# Patient Record
Sex: FEMALE | Race: BLACK OR AFRICAN AMERICAN | Employment: FULL TIME | URBAN - METROPOLITAN AREA
[De-identification: names, ages, dates, MRNs, and addresses within clinical notes are randomized per-mention and may not be internally consistent; named-entity substitution may affect disease eponyms.]

---

## 2017-01-03 ENCOUNTER — TELEPHONE (OUTPATIENT)
Dept: FAMILY MEDICINE CLINIC | Facility: CLINIC | Age: 27
End: 2017-01-03

## 2017-01-03 NOTE — TELEPHONE ENCOUNTER
Pt is going to start a new job and will need a copy of her immunization record. Pt will be in the office tomorrow the copy up.

## 2017-01-04 NOTE — TELEPHONE ENCOUNTER
Patient contacted. She will  immunization form at [facility] .

## 2017-01-16 ENCOUNTER — TELEPHONE (OUTPATIENT)
Dept: OBGYN CLINIC | Facility: CLINIC | Age: 27
End: 2017-01-16

## 2017-01-16 NOTE — TELEPHONE ENCOUNTER
Pt calling to report the past two months she gets a thick white vaginal d/c and extreme burning 2 days before her period is due. Pt states she then gets her period for 7 days and the d/c and burning is gone.  Pt states it does not return until 2 days before

## 2017-01-24 NOTE — TELEPHONE ENCOUNTER
See the note below. Pt states that she ended her period and calling for an appt with JEANA.  Pt states yesterday she had the thick white clumpy vaginal discharge again and vaginal irritation.   Pt would like to be seen this week with JEANA and no appt is avail

## 2017-01-24 NOTE — TELEPHONE ENCOUNTER
JEANA stated that he would see the pt on 2/3/17 at noon at [facility]. Pt given the date, time and location for the appt.

## 2017-02-03 ENCOUNTER — OFFICE VISIT (OUTPATIENT)
Dept: OBGYN CLINIC | Facility: CLINIC | Age: 27
End: 2017-02-03

## 2017-02-03 VITALS
WEIGHT: 149 LBS | SYSTOLIC BLOOD PRESSURE: 112 MMHG | BODY MASS INDEX: 27 KG/M2 | HEART RATE: 69 BPM | DIASTOLIC BLOOD PRESSURE: 75 MMHG

## 2017-02-03 DIAGNOSIS — N89.8 VAGINAL DISCHARGE: Primary | ICD-10-CM

## 2017-02-03 DIAGNOSIS — B37.3 CANDIDIASIS OF VULVA AND VAGINA: ICD-10-CM

## 2017-02-03 PROCEDURE — 99213 OFFICE O/P EST LOW 20 MIN: CPT | Performed by: OBSTETRICS & GYNECOLOGY

## 2017-02-03 RX ORDER — FLUCONAZOLE 150 MG/1
150 TABLET ORAL ONCE
Qty: 2 TABLET | Refills: 0 | Status: SHIPPED | OUTPATIENT
Start: 2017-02-03 | End: 2017-02-03

## 2017-02-03 RX ORDER — NORGESTIMATE AND ETHINYL ESTRADIOL 7DAYSX3 28
1 KIT ORAL DAILY
Qty: 1 PACKAGE | Refills: 5 | Status: SHIPPED | OUTPATIENT
Start: 2017-02-03 | End: 2017-03-03

## 2017-02-05 LAB
CANDIDA SCREEN: NEGATIVE
GENITAL VAGINOSIS SCREEN: NEGATIVE
TRICHOMONAS SCREEN: NEGATIVE

## 2017-02-06 NOTE — PROGRESS NOTES
HPI:    Patient ID: Shayan Gasca is a 32year old female. HPI  with monthly menses lasting 7d and normal flow. She did have on time menses beginning  but lasted until the . This is a single occurrence.  She relates a thick white D lungs every 6 (six) hours as needed for Wheezing. Disp: 1 Inhaler Rfl: 0     Allergies:  Aloe Vera               Hives   PHYSICAL EXAM:   Physical Exam   Genitourinary:   EG, vagina and cervix w/o lesions. Thick white DC c/w candida. Culture done.  Uterus

## 2017-02-14 ENCOUNTER — TELEPHONE (OUTPATIENT)
Dept: OBGYN CLINIC | Facility: CLINIC | Age: 27
End: 2017-02-14

## 2017-02-14 NOTE — TELEPHONE ENCOUNTER
Pt states she recently changed from Dorsie Greener be to trinessa and is asking if it's normal to have irregular bleeding. Pt states she should have gotten her period yesterday but has not. Pt states she started the trinessa last Sunday just like JEANA instructed.  Inf

## 2017-03-29 ENCOUNTER — TELEPHONE (OUTPATIENT)
Dept: FAMILY MEDICINE CLINIC | Facility: CLINIC | Age: 27
End: 2017-03-29

## 2017-03-29 RX ORDER — IBUPROFEN 400 MG/1
TABLET ORAL
Qty: 40 TABLET | Refills: 0 | Status: SHIPPED | OUTPATIENT
Start: 2017-03-29 | End: 2017-04-07

## 2017-03-29 NOTE — TELEPHONE ENCOUNTER
Actions Requested: MD advice/possible Rx change or renew Ibuprofen? Pt has been taking Ibuprofen 800 mg instead of 400 mg prn for the pain. To Dr. Donnie Jimenez for Dr. LARSON out of office.   Ibuprofen 400 mg Rx pended as previously written for MD approval.  Situation/

## 2017-03-29 NOTE — TELEPHONE ENCOUNTER
Message noted. May refill ibuprofen as requested. Erx sent to listed pharmacy.  To follow up for appointment if not better; Please notify patient

## 2017-03-29 NOTE — TELEPHONE ENCOUNTER
Patient is calling to request a refill for   IBUPROFEN 400 MG Oral Tab TAKE 1 TABLET(400 MG) BY MOUTH EVERY 6 HOURS AS NEEDED FOR PAIN Disp: 40 tablet Rfl: 0     Stated that she is still getting headaches.  Doctor prescribed them specifically for the headac

## 2017-04-08 NOTE — TELEPHONE ENCOUNTER
· PLEASE ADVISE ON REFILL. THANKS.    Recent Visits       Provider Department Primary Dx    5 months ago Cami Huggins MD [facility], [facility], [address] Rash and nonspecific skin eruption    6 months ago Cami Huggins MD [address]

## 2017-04-12 RX ORDER — IBUPROFEN 400 MG/1
TABLET ORAL
Qty: 40 TABLET | Refills: 0 | Status: SHIPPED | OUTPATIENT
Start: 2017-04-12 | End: 2017-06-26

## 2017-04-12 NOTE — TELEPHONE ENCOUNTER
Message noted: Chart reviewed and may refill medication as requested times one with # additional refills. Prescription sent to listed pharmacy. Please notify patient.

## 2017-04-19 RX ORDER — IBUPROFEN 400 MG/1
TABLET ORAL
Qty: 40 TABLET | Refills: 0 | OUTPATIENT
Start: 2017-04-19

## 2017-05-03 ENCOUNTER — OFFICE VISIT (OUTPATIENT)
Dept: FAMILY MEDICINE CLINIC | Facility: CLINIC | Age: 27
End: 2017-05-03

## 2017-05-03 VITALS
WEIGHT: 145 LBS | TEMPERATURE: 98 F | SYSTOLIC BLOOD PRESSURE: 103 MMHG | HEART RATE: 70 BPM | BODY MASS INDEX: 26 KG/M2 | DIASTOLIC BLOOD PRESSURE: 71 MMHG

## 2017-05-03 DIAGNOSIS — R51.9 NONINTRACTABLE EPISODIC HEADACHE, UNSPECIFIED HEADACHE TYPE: Primary | ICD-10-CM

## 2017-05-03 PROCEDURE — 99213 OFFICE O/P EST LOW 20 MIN: CPT | Performed by: FAMILY MEDICINE

## 2017-05-03 PROCEDURE — 99212 OFFICE O/P EST SF 10 MIN: CPT | Performed by: FAMILY MEDICINE

## 2017-05-03 RX ORDER — NORGESTIMATE-ETHINYL ESTRADIOL 7DAYSX3 28
TABLET ORAL
Refills: 5 | COMMUNITY
Start: 2017-04-12 | End: 2017-05-31

## 2017-05-03 RX ORDER — AMITRIPTYLINE HYDROCHLORIDE 10 MG/1
10 TABLET, FILM COATED ORAL NIGHTLY
Qty: 30 TABLET | Refills: 2 | Status: SHIPPED | OUTPATIENT
Start: 2017-05-03 | End: 2017-05-15
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
## 2017-05-15 ENCOUNTER — TELEPHONE (OUTPATIENT)
Dept: FAMILY MEDICINE CLINIC | Facility: CLINIC | Age: 27
End: 2017-05-15

## 2017-05-15 ENCOUNTER — OFFICE VISIT (OUTPATIENT)
Dept: FAMILY MEDICINE CLINIC | Facility: CLINIC | Age: 27
End: 2017-05-15

## 2017-05-15 VITALS
TEMPERATURE: 99 F | HEIGHT: 62 IN | SYSTOLIC BLOOD PRESSURE: 120 MMHG | BODY MASS INDEX: 25.76 KG/M2 | DIASTOLIC BLOOD PRESSURE: 86 MMHG | RESPIRATION RATE: 20 BRPM | WEIGHT: 140 LBS | HEART RATE: 96 BPM

## 2017-05-15 DIAGNOSIS — S39.012D LUMBAR STRAIN, SUBSEQUENT ENCOUNTER: ICD-10-CM

## 2017-05-15 DIAGNOSIS — S92.354D CLOSED NONDISPLACED FRACTURE OF FIFTH METATARSAL BONE OF RIGHT FOOT WITH ROUTINE HEALING, SUBSEQUENT ENCOUNTER: Primary | ICD-10-CM

## 2017-05-15 DIAGNOSIS — M54.6 PAIN IN THORACIC SPINE: ICD-10-CM

## 2017-05-15 DIAGNOSIS — S09.90XD HEAD INJURY, SUBSEQUENT ENCOUNTER: ICD-10-CM

## 2017-05-15 DIAGNOSIS — M54.2 CERVICALGIA: ICD-10-CM

## 2017-05-15 PROCEDURE — 99212 OFFICE O/P EST SF 10 MIN: CPT | Performed by: PHYSICIAN ASSISTANT

## 2017-05-15 PROCEDURE — 99214 OFFICE O/P EST MOD 30 MIN: CPT | Performed by: PHYSICIAN ASSISTANT

## 2017-05-15 RX ORDER — CYCLOBENZAPRINE HCL 10 MG
10 TABLET ORAL 3 TIMES DAILY PRN
Qty: 30 TABLET | Refills: 0 | Status: SHIPPED | OUTPATIENT
Start: 2017-05-15 | End: 2017-05-31

## 2017-05-15 RX ORDER — IBUPROFEN 800 MG/1
800 TABLET ORAL EVERY 6 HOURS PRN
Qty: 60 TABLET | Refills: 0 | Status: SHIPPED | OUTPATIENT
Start: 2017-05-15 | End: 2017-05-27

## 2017-05-15 RX ORDER — HYDROCODONE BITARTRATE AND ACETAMINOPHEN 7.5; 325 MG/1; MG/1
1 TABLET ORAL EVERY 4 HOURS PRN
Qty: 40 TABLET | Refills: 0 | Status: SHIPPED | OUTPATIENT
Start: 2017-05-15 | End: 2017-05-31
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
## 2017-05-16 ENCOUNTER — TELEPHONE (OUTPATIENT)
Dept: FAMILY MEDICINE CLINIC | Facility: CLINIC | Age: 27
End: 2017-05-16

## 2017-05-16 ENCOUNTER — TELEPHONE (OUTPATIENT)
Dept: PODIATRY CLINIC | Facility: CLINIC | Age: 27
End: 2017-05-16

## 2017-05-16 DIAGNOSIS — S09.90XA HEAD INJURY, INITIAL ENCOUNTER: Primary | ICD-10-CM

## 2017-05-16 NOTE — TELEPHONE ENCOUNTER
Central Scheduling is now calling. Should pt wait until Thursday for the CT Scan or should they do it stat?

## 2017-05-16 NOTE — TELEPHONE ENCOUNTER
KRISTA Jimenez I have called pt insurance to get a PA since Kimberly Ray changed the order to stat.  I was inform to fax them the OV from yesterday and that someone will call the pt today and give her the authorization number so she can get the CT scan done
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
## 2017-05-16 NOTE — TELEPHONE ENCOUNTER
Patient contacted; explained our office has reached out to [address] [place] [address] for stat CT scan of head. Alfie was to call patient. Patient states she has not heard from [address] [place] [address]. Patient denied any severe head pain at this time.   Patient will call [facility]

## 2017-05-17 NOTE — TELEPHONE ENCOUNTER
Patient called and stated [facility] Authorized the CT scan; Authorization number [id number]. She does not have a ride to have it done today.    Will proceed to have it done on Thursday at 1:30 pm

## 2017-05-18 ENCOUNTER — HOSPITAL ENCOUNTER (OUTPATIENT)
Dept: GENERAL RADIOLOGY | Facility: HOSPITAL | Age: 27
Discharge: HOME | End: 2017-05-18
Attending: PHYSICIAN ASSISTANT
Payer: MEDICAID

## 2017-05-18 ENCOUNTER — HOSPITAL ENCOUNTER (OUTPATIENT)
Dept: CT IMAGING | Facility: HOSPITAL | Age: 27
Discharge: HOME | End: 2017-05-18
Attending: PHYSICIAN ASSISTANT
Payer: MEDICAID

## 2017-05-18 DIAGNOSIS — S09.90XD HEAD INJURY, SUBSEQUENT ENCOUNTER: ICD-10-CM

## 2017-05-18 DIAGNOSIS — M54.2 CERVICALGIA: ICD-10-CM

## 2017-05-18 DIAGNOSIS — S39.012D LUMBAR STRAIN, SUBSEQUENT ENCOUNTER: ICD-10-CM

## 2017-05-18 DIAGNOSIS — M54.6 PAIN IN THORACIC SPINE: ICD-10-CM

## 2017-05-18 PROCEDURE — 72100 X-RAY EXAM L-S SPINE 2/3 VWS: CPT | Performed by: PHYSICIAN ASSISTANT

## 2017-05-18 PROCEDURE — 72070 X-RAY EXAM THORAC SPINE 2VWS: CPT | Performed by: PHYSICIAN ASSISTANT

## 2017-05-18 PROCEDURE — 72040 X-RAY EXAM NECK SPINE 2-3 VW: CPT | Performed by: PHYSICIAN ASSISTANT

## 2017-05-18 PROCEDURE — 70450 CT HEAD/BRAIN W/O DYE: CPT | Performed by: PHYSICIAN ASSISTANT

## 2017-05-19 ENCOUNTER — HOSPITAL ENCOUNTER (OUTPATIENT)
Dept: GENERAL RADIOLOGY | Facility: HOSPITAL | Age: 27
Discharge: HOME | End: 2017-05-19
Attending: ORTHOPAEDIC SURGERY
Payer: MEDICAID

## 2017-05-19 ENCOUNTER — OFFICE VISIT (OUTPATIENT)
Dept: ORTHOPEDICS CLINIC | Facility: CLINIC | Age: 27
End: 2017-05-19

## 2017-05-19 ENCOUNTER — TELEPHONE (OUTPATIENT)
Dept: ADMINISTRATIVE | Age: 27
End: 2017-05-19

## 2017-05-19 DIAGNOSIS — S82.831A CLOSED AVULSION FRACTURE OF DISTAL FIBULA, RIGHT, INITIAL ENCOUNTER: Primary | ICD-10-CM

## 2017-05-19 DIAGNOSIS — M25.571 RIGHT ANKLE PAIN, UNSPECIFIED CHRONICITY: ICD-10-CM

## 2017-05-19 DIAGNOSIS — S82.54XA CLOSED NONDISPLACED FRACTURE OF MEDIAL MALLEOLUS OF RIGHT TIBIA, INITIAL ENCOUNTER: ICD-10-CM

## 2017-05-19 DIAGNOSIS — S92.354A CLOSED NONDISPLACED FRACTURE OF FIFTH METATARSAL BONE OF RIGHT FOOT, INITIAL ENCOUNTER: ICD-10-CM

## 2017-05-19 DIAGNOSIS — M79.671 RIGHT FOOT PAIN: ICD-10-CM

## 2017-05-19 PROCEDURE — 72080 X-RAY EXAM THORACOLMB 2/> VW: CPT | Performed by: ORTHOPAEDIC SURGERY

## 2017-05-19 PROCEDURE — 27760 CLTX MEDIAL ANKLE FX: CPT | Performed by: ORTHOPAEDIC SURGERY

## 2017-05-19 PROCEDURE — 99244 OFF/OP CNSLTJ NEW/EST MOD 40: CPT | Performed by: ORTHOPAEDIC SURGERY

## 2017-05-19 PROCEDURE — 73610 X-RAY EXAM OF ANKLE: CPT | Performed by: ORTHOPAEDIC SURGERY

## 2017-05-19 PROCEDURE — 28470 CLTX METATARSAL FX WO MNP EA: CPT | Performed by: ORTHOPAEDIC SURGERY

## 2017-05-19 PROCEDURE — 99212 OFFICE O/P EST SF 10 MIN: CPT | Performed by: ORTHOPAEDIC SURGERY

## 2017-05-19 PROCEDURE — 73630 X-RAY EXAM OF FOOT: CPT | Performed by: ORTHOPAEDIC SURGERY

## 2017-05-19 RX ORDER — HYDROCODONE BITARTRATE AND ACETAMINOPHEN 5; 325 MG/1; MG/1
1-2 TABLET ORAL
COMMUNITY
Start: 2017-05-15 | End: 2017-05-20
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
## 2017-05-19 NOTE — TELEPHONE ENCOUNTER
Good afternoon Dr. Lima Bear form pending in [place] for patients , Tracy Hirsch. Do you approve intermittent time off for patient's  to transport her to physical therapy and assist at home with their small child? 1-4 times per month?   Please advi

## 2017-05-22 ENCOUNTER — TELEPHONE (OUTPATIENT)
Dept: FAMILY MEDICINE CLINIC | Facility: CLINIC | Age: 27
End: 2017-05-22

## 2017-05-22 NOTE — TELEPHONE ENCOUNTER
Please advise, Pt was seenby You 5/15/17 and  5/19 by Ortho was foot placed in [place] walker,stated right foot more swollen/pain-advised to make sure foot is elevated with heel off pillow,encourage to really Push Fluid IT,Fiber-raw fruits and Prunes since she

## 2017-05-22 NOTE — TELEPHONE ENCOUNTER
Spoke with patient who reports increased swelling of right foot. She denies increased swelling of leg. She is currently in CAM boot. Advised patient to elevate leg and push fluids. She has follow up appointment with ortho 5/26.

## 2017-05-24 ENCOUNTER — TELEPHONE (OUTPATIENT)
Dept: FAMILY MEDICINE CLINIC | Facility: CLINIC | Age: 27
End: 2017-05-24

## 2017-05-26 ENCOUNTER — OFFICE VISIT (OUTPATIENT)
Dept: ORTHOPEDICS CLINIC | Facility: CLINIC | Age: 27
End: 2017-05-26

## 2017-05-26 ENCOUNTER — HOSPITAL ENCOUNTER (OUTPATIENT)
Dept: GENERAL RADIOLOGY | Facility: HOSPITAL | Age: 27
Discharge: HOME | End: 2017-05-26
Attending: ORTHOPAEDIC SURGERY
Payer: MEDICAID

## 2017-05-26 DIAGNOSIS — M79.671 RIGHT FOOT PAIN: ICD-10-CM

## 2017-05-26 DIAGNOSIS — S82.54XA CLOSED NONDISPLACED FRACTURE OF MEDIAL MALLEOLUS OF RIGHT TIBIA, INITIAL ENCOUNTER: ICD-10-CM

## 2017-05-26 DIAGNOSIS — Z47.89 ORTHOPEDIC AFTERCARE: ICD-10-CM

## 2017-05-26 DIAGNOSIS — S92.354A CLOSED NONDISPLACED FRACTURE OF FIFTH METATARSAL BONE OF RIGHT FOOT, INITIAL ENCOUNTER: ICD-10-CM

## 2017-05-26 DIAGNOSIS — S82.831A CLOSED AVULSION FRACTURE OF DISTAL FIBULA, RIGHT, INITIAL ENCOUNTER: Primary | ICD-10-CM

## 2017-05-26 PROCEDURE — 73610 X-RAY EXAM OF ANKLE: CPT | Performed by: ORTHOPAEDIC SURGERY

## 2017-05-26 PROCEDURE — 99212 OFFICE O/P EST SF 10 MIN: CPT | Performed by: ORTHOPAEDIC SURGERY

## 2017-05-26 PROCEDURE — 73630 X-RAY EXAM OF FOOT: CPT | Performed by: ORTHOPAEDIC SURGERY

## 2017-05-26 PROCEDURE — 99024 POSTOP FOLLOW-UP VISIT: CPT | Performed by: ORTHOPAEDIC SURGERY

## 2017-05-26 RX ORDER — GABAPENTIN 300 MG/1
300 CAPSULE ORAL 3 TIMES DAILY
Qty: 90 CAPSULE | Refills: 0 | Status: SHIPPED | OUTPATIENT
Start: 2017-05-26 | End: 2017-06-22
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
## 2017-05-30 ENCOUNTER — TELEPHONE (OUTPATIENT)
Dept: FAMILY MEDICINE CLINIC | Facility: CLINIC | Age: 27
End: 2017-05-30

## 2017-05-30 RX ORDER — IBUPROFEN 800 MG/1
TABLET ORAL
Qty: 60 TABLET | Refills: 0 | Status: SHIPPED | OUTPATIENT
Start: 2017-05-30 | End: 2017-06-12

## 2017-05-30 NOTE — TELEPHONE ENCOUNTER
Pt calling regarding message she had earlier pt wants get medication refilled for  hydrocodone-acetaminophen 7.5-325 MG Oral Tab, Cyclobenzaprine HCl 10 MG Oral Tab and TRINESSA, 28, 0.18/0.215/0.25 MG-35 MCG Oral Tab.         Current Outpatient Prescriptio

## 2017-05-30 NOTE — TELEPHONE ENCOUNTER
Pt asking to speak with Jasmin Conklin or an nurse regarding medication. No other info was given. Please advise.

## 2017-05-31 RX ORDER — CYCLOBENZAPRINE HCL 10 MG
10 TABLET ORAL 3 TIMES DAILY PRN
Qty: 30 TABLET | Refills: 0 | Status: SHIPPED | OUTPATIENT
Start: 2017-05-31 | End: 2018-01-13 | Stop reason: ALTCHOICE

## 2017-05-31 RX ORDER — HYDROCODONE BITARTRATE AND ACETAMINOPHEN 7.5; 325 MG/1; MG/1
1 TABLET ORAL EVERY 4 HOURS PRN
Qty: 40 TABLET | Refills: 0 | Status: SHIPPED | OUTPATIENT
Start: 2017-05-31 | End: 2017-06-19

## 2017-05-31 RX ORDER — NORGESTIMATE-ETHINYL ESTRADIOL 7DAYSX3 28
TABLET ORAL
Qty: 28 TABLET | Refills: 5 | Status: SHIPPED | OUTPATIENT
Start: 2017-05-31 | End: 2017-06-26

## 2017-05-31 NOTE — TELEPHONE ENCOUNTER
Medication refills approved. Please notify patient Stephen Winston ready for  at [facility] office.

## 2017-06-09 ENCOUNTER — HOSPITAL ENCOUNTER (OUTPATIENT)
Dept: GENERAL RADIOLOGY | Facility: HOSPITAL | Age: 27
Discharge: HOME | End: 2017-06-09
Attending: ORTHOPAEDIC SURGERY
Payer: MEDICAID

## 2017-06-09 ENCOUNTER — OFFICE VISIT (OUTPATIENT)
Dept: ORTHOPEDICS CLINIC | Facility: CLINIC | Age: 27
End: 2017-06-09

## 2017-06-09 DIAGNOSIS — Z47.89 ORTHOPEDIC AFTERCARE: ICD-10-CM

## 2017-06-09 DIAGNOSIS — S92.354A CLOSED NONDISPLACED FRACTURE OF FIFTH METATARSAL BONE OF RIGHT FOOT, INITIAL ENCOUNTER: ICD-10-CM

## 2017-06-09 DIAGNOSIS — S82.831A CLOSED AVULSION FRACTURE OF DISTAL FIBULA, RIGHT, INITIAL ENCOUNTER: Primary | ICD-10-CM

## 2017-06-09 DIAGNOSIS — S82.54XA CLOSED NONDISPLACED FRACTURE OF MEDIAL MALLEOLUS OF RIGHT TIBIA, INITIAL ENCOUNTER: ICD-10-CM

## 2017-06-09 PROCEDURE — 73630 X-RAY EXAM OF FOOT: CPT | Performed by: ORTHOPAEDIC SURGERY

## 2017-06-09 PROCEDURE — 99212 OFFICE O/P EST SF 10 MIN: CPT | Performed by: ORTHOPAEDIC SURGERY

## 2017-06-09 PROCEDURE — 73610 X-RAY EXAM OF ANKLE: CPT | Performed by: ORTHOPAEDIC SURGERY

## 2017-06-09 PROCEDURE — 99024 POSTOP FOLLOW-UP VISIT: CPT | Performed by: ORTHOPAEDIC SURGERY

## 2017-06-09 NOTE — TELEPHONE ENCOUNTER
Spoke with patient at son's office visit. She states all paperwork is taken care of at this time. Will await any other paperwork if needed.
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
## 2017-06-12 RX ORDER — IBUPROFEN 800 MG/1
TABLET ORAL
Qty: 60 TABLET | Refills: 0 | Status: SHIPPED | OUTPATIENT
Start: 2017-06-12 | End: 2017-06-26

## 2017-06-15 ENCOUNTER — TELEPHONE (OUTPATIENT)
Dept: ORTHOPEDICS CLINIC | Facility: CLINIC | Age: 27
End: 2017-06-15

## 2017-06-15 NOTE — TELEPHONE ENCOUNTER
S/w pt and she states the last 3 days her swelling has gone down, but her foot/ankle pain has increased slightly for 4/10 to 6/10 and tender to touch. She states there is more bruising over the foot. She denies any warmth or redness.  She is taking gabapent

## 2017-06-19 RX ORDER — HYDROCODONE BITARTRATE AND ACETAMINOPHEN 5; 325 MG/1; MG/1
1 TABLET ORAL EVERY 4 HOURS PRN
Qty: 30 TABLET | Refills: 0 | Status: SHIPPED | OUTPATIENT
Start: 2017-06-19 | End: 2018-01-13

## 2017-06-22 RX ORDER — GABAPENTIN 300 MG/1
300 CAPSULE ORAL 3 TIMES DAILY
Qty: 90 CAPSULE | Refills: 0 | Status: SHIPPED | OUTPATIENT
Start: 2017-06-22 | End: 2018-01-13 | Stop reason: ALTCHOICE

## 2017-06-22 NOTE — TELEPHONE ENCOUNTER
Received fax from [address] in [place] on [address] [address] requesting refill of Gabapentin 300 mg capsules from [facility].

## 2017-06-26 ENCOUNTER — OFFICE VISIT (OUTPATIENT)
Dept: OBGYN CLINIC | Facility: CLINIC | Age: 27
End: 2017-06-26

## 2017-06-26 VITALS
DIASTOLIC BLOOD PRESSURE: 85 MMHG | WEIGHT: 135 LBS | HEIGHT: 62 IN | BODY MASS INDEX: 24.84 KG/M2 | HEART RATE: 66 BPM | SYSTOLIC BLOOD PRESSURE: 126 MMHG

## 2017-06-26 DIAGNOSIS — Z01.419 ENCOUNTER FOR GYNECOLOGICAL EXAMINATION WITHOUT ABNORMAL FINDING: Primary | ICD-10-CM

## 2017-06-26 PROCEDURE — 99395 PREV VISIT EST AGE 18-39: CPT | Performed by: OBSTETRICS & GYNECOLOGY

## 2017-06-26 RX ORDER — NORGESTIMATE-ETHINYL ESTRADIOL 7DAYSX3 28
TABLET ORAL
Qty: 1 PACKAGE | Refills: 12 | Status: SHIPPED | OUTPATIENT
Start: 2017-06-26 | End: 2018-01-13

## 2017-06-26 NOTE — PROGRESS NOTES
HPI:    Patient ID: Shayan Gasca is a 32year old female. HPI G2 [id number] with reg menses on OCP for BC. No complaints. No current plans for conception.      Review of Systems   Constitutional: Negative for appetite change, fatigue and unexpected weight breasts without skin / nipple changes, mass, tenderness or axillary adenopathy. Abdominal: Soft. She exhibits no distension and no mass. There is no tenderness. There is no rebound and no guarding.    Genitourinary: Vagina normal and uterus normal. No b

## 2017-06-27 RX ORDER — IBUPROFEN 800 MG/1
TABLET ORAL
Qty: 60 TABLET | Refills: 1 | Status: SHIPPED | OUTPATIENT
Start: 2017-06-27 | End: 2017-07-22

## 2017-06-30 ENCOUNTER — HOSPITAL ENCOUNTER (OUTPATIENT)
Dept: GENERAL RADIOLOGY | Facility: HOSPITAL | Age: 27
Discharge: HOME | End: 2017-06-30
Attending: ORTHOPAEDIC SURGERY
Payer: MEDICAID

## 2017-06-30 ENCOUNTER — OFFICE VISIT (OUTPATIENT)
Dept: ORTHOPEDICS CLINIC | Facility: CLINIC | Age: 27
End: 2017-06-30

## 2017-06-30 DIAGNOSIS — Z47.89 ORTHOPEDIC AFTERCARE: ICD-10-CM

## 2017-06-30 DIAGNOSIS — S82.54XA CLOSED NONDISPLACED FRACTURE OF MEDIAL MALLEOLUS OF RIGHT TIBIA, INITIAL ENCOUNTER: ICD-10-CM

## 2017-06-30 DIAGNOSIS — S82.831A CLOSED AVULSION FRACTURE OF DISTAL FIBULA, RIGHT, INITIAL ENCOUNTER: Primary | ICD-10-CM

## 2017-06-30 DIAGNOSIS — S92.354A CLOSED NONDISPLACED FRACTURE OF FIFTH METATARSAL BONE OF RIGHT FOOT, INITIAL ENCOUNTER: ICD-10-CM

## 2017-06-30 PROCEDURE — 73630 X-RAY EXAM OF FOOT: CPT | Performed by: ORTHOPAEDIC SURGERY

## 2017-06-30 PROCEDURE — 73610 X-RAY EXAM OF ANKLE: CPT | Performed by: ORTHOPAEDIC SURGERY

## 2017-06-30 PROCEDURE — 99212 OFFICE O/P EST SF 10 MIN: CPT | Performed by: ORTHOPAEDIC SURGERY

## 2017-06-30 PROCEDURE — 99024 POSTOP FOLLOW-UP VISIT: CPT | Performed by: ORTHOPAEDIC SURGERY
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
## 2017-07-07 ENCOUNTER — OFFICE VISIT (OUTPATIENT)
Dept: PHYSICAL THERAPY | Facility: HOSPITAL | Age: 27
End: 2017-07-07
Attending: ORTHOPAEDIC SURGERY
Payer: COMMERCIAL

## 2017-07-07 DIAGNOSIS — S92.354A CLOSED NONDISPLACED FRACTURE OF FIFTH METATARSAL BONE OF RIGHT FOOT, INITIAL ENCOUNTER: ICD-10-CM

## 2017-07-07 DIAGNOSIS — S82.54XA CLOSED NONDISPLACED FRACTURE OF MEDIAL MALLEOLUS OF RIGHT TIBIA, INITIAL ENCOUNTER: ICD-10-CM

## 2017-07-07 DIAGNOSIS — S82.831A CLOSED AVULSION FRACTURE OF DISTAL FIBULA, RIGHT, INITIAL ENCOUNTER: ICD-10-CM

## 2017-07-07 PROCEDURE — 97110 THERAPEUTIC EXERCISES: CPT

## 2017-07-07 PROCEDURE — 97162 PT EVAL MOD COMPLEX 30 MIN: CPT
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
## 2017-07-12 ENCOUNTER — TELEPHONE (OUTPATIENT)
Dept: ORTHOPEDICS CLINIC | Facility: CLINIC | Age: 27
End: 2017-07-12

## 2017-07-12 NOTE — TELEPHONE ENCOUNTER
S/w pt and she states she needs 4 more wks of PT and is requesting a note for work to be off another 6 wks. Please advise? Also she is requesting to use her full name in the letter Erik Shepherd.

## 2017-07-12 NOTE — TELEPHONE ENCOUNTER
Pt needs note for work for 2 week extension due to needing 2 more weeks of PT - 6 weeks form now - asking for note to be mailed to her and a copy ot be faxed to work at [phone] sara Looney

## 2017-07-13 NOTE — TELEPHONE ENCOUNTER
Call to Su. Made aware that letter will be timed for next appointment August 11th, Pt wanted copy of letter also to be mailed. Letter faxed to her work as sima and mailed to her.

## 2017-07-17 ENCOUNTER — APPOINTMENT (OUTPATIENT)
Dept: PHYSICAL THERAPY | Facility: HOSPITAL | Age: 27
End: 2017-07-17
Attending: ORTHOPAEDIC SURGERY
Payer: COMMERCIAL

## 2017-07-21 ENCOUNTER — OFFICE VISIT (OUTPATIENT)
Dept: PHYSICAL THERAPY | Facility: HOSPITAL | Age: 27
End: 2017-07-21
Attending: ORTHOPAEDIC SURGERY
Payer: COMMERCIAL

## 2017-07-21 DIAGNOSIS — S82.54XA CLOSED NONDISPLACED FRACTURE OF MEDIAL MALLEOLUS OF RIGHT TIBIA, INITIAL ENCOUNTER: ICD-10-CM

## 2017-07-21 DIAGNOSIS — S82.831A CLOSED AVULSION FRACTURE OF DISTAL FIBULA, RIGHT, INITIAL ENCOUNTER: ICD-10-CM

## 2017-07-21 DIAGNOSIS — S92.354A CLOSED NONDISPLACED FRACTURE OF FIFTH METATARSAL BONE OF RIGHT FOOT, INITIAL ENCOUNTER: ICD-10-CM

## 2017-07-21 PROCEDURE — 97110 THERAPEUTIC EXERCISES: CPT

## 2017-07-21 NOTE — PROGRESS NOTES
Diagnosis: Closed avulsion fracture of distal fibula, right, initial encounter (I30.697F)  Closed nondisplaced fracture of medial malleolus of right tibia, initial encounter (S82.54XA)  Closed nondisplaced fracture of fifth metatarsal bone of right foot, i

## 2017-07-24 ENCOUNTER — APPOINTMENT (OUTPATIENT)
Dept: PHYSICAL THERAPY | Facility: HOSPITAL | Age: 27
End: 2017-07-24
Attending: ORTHOPAEDIC SURGERY
Payer: COMMERCIAL

## 2017-07-25 RX ORDER — IBUPROFEN 800 MG/1
TABLET ORAL
Qty: 60 TABLET | Refills: 0 | Status: SHIPPED | OUTPATIENT
Start: 2017-07-25 | End: 2017-08-09

## 2017-07-28 ENCOUNTER — OFFICE VISIT (OUTPATIENT)
Dept: PHYSICAL THERAPY | Facility: HOSPITAL | Age: 27
End: 2017-07-28
Attending: ORTHOPAEDIC SURGERY
Payer: COMMERCIAL

## 2017-07-28 DIAGNOSIS — S82.831A CLOSED AVULSION FRACTURE OF DISTAL FIBULA, RIGHT, INITIAL ENCOUNTER: ICD-10-CM

## 2017-07-28 DIAGNOSIS — S92.354A CLOSED NONDISPLACED FRACTURE OF FIFTH METATARSAL BONE OF RIGHT FOOT, INITIAL ENCOUNTER: ICD-10-CM

## 2017-07-28 DIAGNOSIS — S82.54XA CLOSED NONDISPLACED FRACTURE OF MEDIAL MALLEOLUS OF RIGHT TIBIA, INITIAL ENCOUNTER: ICD-10-CM

## 2017-07-28 PROCEDURE — 97110 THERAPEUTIC EXERCISES: CPT

## 2017-07-28 NOTE — PROGRESS NOTES
Diagnosis: Closed avulsion fracture of distal fibula, right, initial encounter (K70.640T)  Closed nondisplaced fracture of medial malleolus of right tibia, initial encounter (S82.54XA)  Closed nondisplaced fracture of fifth metatarsal bone of right foot, i

## 2017-07-31 ENCOUNTER — APPOINTMENT (OUTPATIENT)
Dept: PHYSICAL THERAPY | Facility: HOSPITAL | Age: 27
End: 2017-07-31
Attending: ORTHOPAEDIC SURGERY
Payer: COMMERCIAL

## 2017-08-04 ENCOUNTER — OFFICE VISIT (OUTPATIENT)
Dept: PHYSICAL THERAPY | Facility: HOSPITAL | Age: 27
End: 2017-08-04
Attending: ORTHOPAEDIC SURGERY
Payer: COMMERCIAL

## 2017-08-04 DIAGNOSIS — S92.354A CLOSED NONDISPLACED FRACTURE OF FIFTH METATARSAL BONE OF RIGHT FOOT, INITIAL ENCOUNTER: ICD-10-CM

## 2017-08-04 DIAGNOSIS — S82.54XA CLOSED NONDISPLACED FRACTURE OF MEDIAL MALLEOLUS OF RIGHT TIBIA, INITIAL ENCOUNTER: ICD-10-CM

## 2017-08-04 DIAGNOSIS — S82.831A CLOSED AVULSION FRACTURE OF DISTAL FIBULA, RIGHT, INITIAL ENCOUNTER: ICD-10-CM

## 2017-08-04 PROCEDURE — 97110 THERAPEUTIC EXERCISES: CPT

## 2017-08-04 NOTE — PROGRESS NOTES
Diagnosis: Closed avulsion fracture of distal fibula, right, initial encounter (B26.998W)  Closed nondisplaced fracture of medial malleolus of right tibia, initial encounter (S82.54XA)  Closed nondisplaced fracture of fifth metatarsal bone of right foot, i

## 2017-08-10 ENCOUNTER — APPOINTMENT (OUTPATIENT)
Dept: PHYSICAL THERAPY | Facility: HOSPITAL | Age: 27
End: 2017-08-10
Attending: ORTHOPAEDIC SURGERY
Payer: COMMERCIAL

## 2017-08-11 ENCOUNTER — HOSPITAL ENCOUNTER (OUTPATIENT)
Dept: GENERAL RADIOLOGY | Facility: HOSPITAL | Age: 27
Discharge: HOME | End: 2017-08-11
Attending: ORTHOPAEDIC SURGERY
Payer: COMMERCIAL

## 2017-08-11 ENCOUNTER — OFFICE VISIT (OUTPATIENT)
Dept: ORTHOPEDICS CLINIC | Facility: CLINIC | Age: 27
End: 2017-08-11

## 2017-08-11 DIAGNOSIS — Z47.89 ORTHOPEDIC AFTERCARE: ICD-10-CM

## 2017-08-11 DIAGNOSIS — S82.54XA CLOSED NONDISPLACED FRACTURE OF MEDIAL MALLEOLUS OF RIGHT TIBIA, INITIAL ENCOUNTER: ICD-10-CM

## 2017-08-11 DIAGNOSIS — S92.354A CLOSED NONDISPLACED FRACTURE OF FIFTH METATARSAL BONE OF RIGHT FOOT, INITIAL ENCOUNTER: ICD-10-CM

## 2017-08-11 DIAGNOSIS — S82.831A CLOSED AVULSION FRACTURE OF DISTAL FIBULA, RIGHT, INITIAL ENCOUNTER: Primary | ICD-10-CM

## 2017-08-11 PROCEDURE — 99024 POSTOP FOLLOW-UP VISIT: CPT | Performed by: ORTHOPAEDIC SURGERY

## 2017-08-11 PROCEDURE — 99212 OFFICE O/P EST SF 10 MIN: CPT | Performed by: ORTHOPAEDIC SURGERY

## 2017-08-11 PROCEDURE — 73630 X-RAY EXAM OF FOOT: CPT | Performed by: ORTHOPAEDIC SURGERY

## 2017-08-11 PROCEDURE — 73610 X-RAY EXAM OF ANKLE: CPT | Performed by: ORTHOPAEDIC SURGERY

## 2017-08-11 RX ORDER — IBUPROFEN 800 MG/1
TABLET ORAL
Qty: 60 TABLET | Refills: 0 | Status: SHIPPED | OUTPATIENT
Start: 2017-08-11 | End: 2017-08-25
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
## 2017-08-15 ENCOUNTER — TELEPHONE (OUTPATIENT)
Dept: ORTHOPEDICS CLINIC | Facility: CLINIC | Age: 27
End: 2017-08-15

## 2017-08-15 NOTE — TELEPHONE ENCOUNTER
Pt asking if 8/11 XR shows that foot is is completely healed? States it was not discussed during OV. Pls advise thank you.

## 2017-08-18 NOTE — PROGRESS NOTES
Patient Name: Pillo Elizalde, : 1990, MRN: [id number]   Date:  2017  Referring Physician:  Rubin Herrera    Diagnosis: Closed avulsion fracture of distal fibula, right, initial encounter (D62.489V)  Closed nondisplaced fracture of me

## 2017-08-25 RX ORDER — IBUPROFEN 800 MG/1
TABLET ORAL
Qty: 60 TABLET | Refills: 0 | Status: SHIPPED | OUTPATIENT
Start: 2017-08-25 | End: 2017-09-12

## 2017-09-08 ENCOUNTER — TELEPHONE (OUTPATIENT)
Dept: ORTHOPEDICS CLINIC | Facility: CLINIC | Age: 27
End: 2017-09-08

## 2017-09-08 NOTE — TELEPHONE ENCOUNTER
Patient requesting a note stating okay to return to work. And to see if there is any restrictions when returning. States her foot is still swelling. Patient would like to  letter at .  Please put her whole name on the letter as they know he

## 2017-09-11 NOTE — TELEPHONE ENCOUNTER
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 She states she still has intermittent foot swelling that is alleviated w/ elevating and icing. Please advise.

## 2017-09-12 RX ORDER — IBUPROFEN 800 MG/1
TABLET ORAL
Qty: 60 TABLET | Refills: 0 | Status: SHIPPED | OUTPATIENT
Start: 2017-09-12 | End: 2017-09-24

## 2017-09-13 NOTE — TELEPHONE ENCOUNTER
Pt requesting the note be faxed to [phone] [facility] employee health mailbox. Pt will still be stopping by to  the copy for herself.
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
## 2017-09-18 ENCOUNTER — TELEPHONE (OUTPATIENT)
Dept: OTHER | Age: 27
End: 2017-09-18

## 2017-09-18 ENCOUNTER — HOSPITAL ENCOUNTER (OUTPATIENT)
Age: 27
Discharge: HOME | End: 2017-09-18
Payer: COMMERCIAL

## 2017-09-18 VITALS
BODY MASS INDEX: 23.92 KG/M2 | DIASTOLIC BLOOD PRESSURE: 82 MMHG | HEART RATE: 89 BPM | OXYGEN SATURATION: 98 % | TEMPERATURE: 98 F | RESPIRATION RATE: 18 BRPM | WEIGHT: 130 LBS | SYSTOLIC BLOOD PRESSURE: 127 MMHG | HEIGHT: 62 IN

## 2017-09-18 DIAGNOSIS — N30.01 ACUTE CYSTITIS WITH HEMATURIA: Primary | ICD-10-CM

## 2017-09-18 LAB
B-HCG UR QL: NEGATIVE
URINE GLUCOSE: NEGATIVE MG/DL
URINE KETONES: 15 MG/DL
URINE NITRITE: POSITIVE
URINE PH: 6
URINE SPECIFIC GRAVITY: >=1.03
URINE UROBILINOGEN: 1 MG/DL

## 2017-09-18 PROCEDURE — 87086 URINE CULTURE/COLONY COUNT: CPT | Performed by: NURSE PRACTITIONER

## 2017-09-18 PROCEDURE — 99204 OFFICE O/P NEW MOD 45 MIN: CPT

## 2017-09-18 PROCEDURE — 99214 OFFICE O/P EST MOD 30 MIN: CPT

## 2017-09-18 PROCEDURE — 87186 SC STD MICRODIL/AGAR DIL: CPT | Performed by: NURSE PRACTITIONER

## 2017-09-18 PROCEDURE — 87088 URINE BACTERIA CULTURE: CPT | Performed by: NURSE PRACTITIONER

## 2017-09-18 PROCEDURE — 81025 URINE PREGNANCY TEST: CPT

## 2017-09-18 RX ORDER — PHENAZOPYRIDINE HYDROCHLORIDE 100 MG/1
100 TABLET, FILM COATED ORAL 3 TIMES DAILY PRN
Qty: 6 TABLET | Refills: 0 | Status: SHIPPED | OUTPATIENT
Start: 2017-09-18 | End: 2017-09-25

## 2017-09-18 RX ORDER — NITROFURANTOIN 25; 75 MG/1; MG/1
100 CAPSULE ORAL 2 TIMES DAILY
Qty: 14 CAPSULE | Refills: 0 | Status: SHIPPED | OUTPATIENT
Start: 2017-09-18 | End: 2017-09-25

## 2017-09-18 NOTE — TELEPHONE ENCOUNTER
Seeking appt to accommodate her shcd with c/c UTI s/s started yesterday frequency,burning w/urination,today lower back pain,appt made to [facility],advised [facility] if [facility] accept-advised Azo OTC for s/s and aleve for discomfort

## 2017-09-19 NOTE — ED INITIAL ASSESSMENT (HPI)
Frequency, urgency, lower right back pain, abdominal pressure since yesterday. Denies any bleeding, fevers.

## 2017-09-19 NOTE — ED PROVIDER NOTES
Patient presents with:  Urinary Symptoms (urologic)      HPI:     Chaka Ochoa is a 32year old female who presents with a chief complaint of dysuria, urgency, and urinary frequency. Patient reports symptoms started 1 day ago.  Pt denies any nausea, vo also instructed to increase fluids. Patient verbally agrees to plan of care and states understanding.       Orders Placed This Encounter      POC Urinalysis Dipstick Once      POCT Pregnancy, Urine Once      Urine Culture, Routine Once      POCT Pregnancy,

## 2017-09-25 RX ORDER — IBUPROFEN 800 MG/1
TABLET ORAL
Qty: 60 TABLET | Refills: 0 | Status: SHIPPED | OUTPATIENT
Start: 2017-09-25 | End: 2018-01-13

## 2017-10-19 ENCOUNTER — TELEPHONE (OUTPATIENT)
Dept: ORTHOPEDICS CLINIC | Facility: CLINIC | Age: 27
End: 2017-10-19

## 2017-10-19 NOTE — TELEPHONE ENCOUNTER
Prescription refill request paper documentation for gabapentin. Last office visit 8-11-17 Last script 6-22-17 one month supply.   Please advise

## 2017-10-20 NOTE — TELEPHONE ENCOUNTER
Patient calling back. Patients states she does not need refill. Believes its the pharmacy system just trying to automatically refill medication. States she has completed treatment. Please advise. Thank you.

## 2017-10-26 ENCOUNTER — TELEPHONE (OUTPATIENT)
Dept: OPHTHALMOLOGY | Facility: CLINIC | Age: 27
End: 2017-10-26

## 2017-10-26 ENCOUNTER — TELEPHONE (OUTPATIENT)
Dept: ORTHOPEDICS CLINIC | Facility: CLINIC | Age: 27
End: 2017-10-26

## 2017-10-26 NOTE — TELEPHONE ENCOUNTER
Knee pain and ankle foot pain. Pt feels off balance. Has not been seen since August.  Pt is illini care.  Will you see with authorization or should she see another provider

## 2017-10-27 NOTE — TELEPHONE ENCOUNTER
Call to Flavio Sullivan. Explained that she should find an orthopedic provider that accepts [facility]. Explained that she needs to call [facility] herself to get a listing and locations of orthopedic providers. Appointment is cancelled.

## 2017-10-31 RX ORDER — HYDROCODONE BITARTRATE AND ACETAMINOPHEN 5; 325 MG/1; MG/1
1 TABLET ORAL EVERY 4 HOURS PRN
Qty: 30 TABLET | Refills: 0 | OUTPATIENT
Start: 2017-10-31

## 2017-10-31 NOTE — TELEPHONE ENCOUNTER
Per pt, she needs refill on her [place],  Pt is out of med. Current Outpatient Prescriptions:                    HYDROcodone-acetaminophen 5-325 MG Oral Tab Take 1 tablet by mouth every 4 (four) hours as needed for Pain.  Disp: 30 tablet Rfl: 0

## 2017-11-21 RX ORDER — NORGESTIMATE-ETHINYL ESTRADIOL 7DAYSX3 28
TABLET ORAL
Qty: 28 TABLET | Refills: 0 | OUTPATIENT
Start: 2017-11-21

## 2018-01-13 ENCOUNTER — OFFICE VISIT (OUTPATIENT)
Dept: FAMILY MEDICINE CLINIC | Facility: CLINIC | Age: 28
End: 2018-01-13

## 2018-01-13 VITALS
SYSTOLIC BLOOD PRESSURE: 128 MMHG | HEIGHT: 62.5 IN | HEART RATE: 81 BPM | WEIGHT: 142 LBS | BODY MASS INDEX: 25.48 KG/M2 | DIASTOLIC BLOOD PRESSURE: 79 MMHG | TEMPERATURE: 98 F

## 2018-01-13 DIAGNOSIS — Z00.00 ROUTINE GENERAL MEDICAL EXAMINATION AT A HEALTH CARE FACILITY: Primary | ICD-10-CM

## 2018-01-13 DIAGNOSIS — K64.4 EXTERNAL HEMORRHOID: ICD-10-CM

## 2018-01-13 DIAGNOSIS — N76.0 ACUTE VAGINITIS: ICD-10-CM

## 2018-01-13 DIAGNOSIS — Z01.419 ENCOUNTER FOR GYNECOLOGICAL EXAMINATION WITHOUT ABNORMAL FINDING: ICD-10-CM

## 2018-01-13 PROCEDURE — 99395 PREV VISIT EST AGE 18-39: CPT | Performed by: PHYSICIAN ASSISTANT

## 2018-01-13 RX ORDER — NORGESTIMATE-ETHINYL ESTRADIOL 7DAYSX3 28
TABLET ORAL
Qty: 1 PACKAGE | Refills: 12 | Status: SHIPPED | OUTPATIENT
Start: 2018-01-13 | End: 2018-06-28

## 2018-01-13 RX ORDER — METRONIDAZOLE 500 MG/1
500 TABLET ORAL 2 TIMES DAILY
Qty: 14 TABLET | Refills: 0 | Status: SHIPPED | OUTPATIENT
Start: 2018-01-13 | End: 2018-01-20

## 2018-01-13 RX ORDER — IBUPROFEN 800 MG/1
TABLET ORAL
Qty: 60 TABLET | Refills: 1 | Status: SHIPPED | OUTPATIENT
Start: 2018-01-13 | End: 2018-10-19 | Stop reason: ALTCHOICE

## 2018-01-13 RX ORDER — HYDROCODONE BITARTRATE AND ACETAMINOPHEN 5; 325 MG/1; MG/1
1 TABLET ORAL EVERY 4 HOURS PRN
Qty: 30 TABLET | Refills: 0 | Status: SHIPPED | OUTPATIENT
Start: 2018-01-13 | End: 2018-07-03

## 2018-01-13 NOTE — PROGRESS NOTES
HPI:   Mode Archuleta is a 32year old female who presents for physical exam and pap. Her last pap was 6/2015 and was normal.  She is on Trinessa and periods are regular on OCP. She complains of vaginal discharge and odor for past week.   She is in mono education:                 Number of children:               Social History Main Topics    Smoking status: Never Smoker                                                                Smokeless tobacco: Never Used                        Alcohol use:  No (36.4 °C) (Oral)   Ht 5' 2.5\" (1.588 m)   Wt 142 lb (64.4 kg)   LMP 12/26/2017 (Exact Date)   BMI 25.56 kg/m²       Vital signs reviewed. Appears stated age, well groomed.     Physical Exam:  GEN:  Patient is alert, awake and oriented, well developed, well how smoking increases these risks. Discussed symptoms of DVT and advised to seek treatment immediately with any pain or swelling of calf, shortness of breath, or chest pain.   Informed patient the pill does not protect against sexually transmitted infectio

## 2018-01-15 LAB
C TRACH DNA SPEC QL NAA+PROBE: NEGATIVE
GENITAL VAGINOSIS SCREEN: POSITIVE
N GONORRHOEA DNA SPEC QL NAA+PROBE: NEGATIVE
TRICHOMONAS SCREEN: NEGATIVE

## 2018-01-19 ENCOUNTER — NURSE TRIAGE (OUTPATIENT)
Dept: OTHER | Age: 28
End: 2018-01-19

## 2018-01-19 RX ORDER — FLUCONAZOLE 150 MG/1
150 TABLET ORAL ONCE
Qty: 1 TABLET | Refills: 0 | Status: SHIPPED | OUTPATIENT
Start: 2018-01-19 | End: 2018-01-19

## 2018-01-19 NOTE — TELEPHONE ENCOUNTER
Will treat with fluconazole 150 mg.  Medication sent to pharmacy. Patient should follow up if symptoms do not resolve after medication.

## 2018-01-19 NOTE — TELEPHONE ENCOUNTER
Action Requested: Summary for Provider     []  Critical Lab, Recommendations Needed  [] Need Additional Advice  []   FYI    []   Need Orders  [] Need Medications Sent to Pharmacy  []  Other     SUMMARY: pt was seen and treated for Bacterial vaginosis on 01

## 2018-02-06 ENCOUNTER — NURSE TRIAGE (OUTPATIENT)
Dept: OTHER | Age: 28
End: 2018-02-06

## 2018-02-06 NOTE — TELEPHONE ENCOUNTER
Action Requested: Summary for Provider     []  Critical Lab, Recommendations Needed  [] Need Additional Advice  []   FYI    []   Need Orders  [] Need Medications Sent to Pharmacy  []  Other     SUMMARY: Pls sign off if ok.  [facility] pt noticed a quarter siz

## 2018-02-08 ENCOUNTER — TELEPHONE (OUTPATIENT)
Dept: FAMILY MEDICINE CLINIC | Facility: CLINIC | Age: 28
End: 2018-02-08

## 2018-02-09 ENCOUNTER — OFFICE VISIT (OUTPATIENT)
Dept: FAMILY MEDICINE CLINIC | Facility: CLINIC | Age: 28
End: 2018-02-09

## 2018-02-09 VITALS
BODY MASS INDEX: 26 KG/M2 | SYSTOLIC BLOOD PRESSURE: 118 MMHG | HEART RATE: 74 BPM | WEIGHT: 145 LBS | DIASTOLIC BLOOD PRESSURE: 80 MMHG | TEMPERATURE: 98 F

## 2018-02-09 DIAGNOSIS — L42 PITYRIASIS ROSEA: Primary | ICD-10-CM

## 2018-02-09 PROCEDURE — 99213 OFFICE O/P EST LOW 20 MIN: CPT | Performed by: PHYSICIAN ASSISTANT

## 2018-02-09 PROCEDURE — 99212 OFFICE O/P EST SF 10 MIN: CPT | Performed by: PHYSICIAN ASSISTANT

## 2018-02-09 RX ORDER — KETOCONAZOLE 20 MG/G
CREAM TOPICAL
Qty: 30 G | Refills: 1 | Status: SHIPPED | OUTPATIENT
Start: 2018-02-09 | End: 2018-06-28

## 2018-02-09 RX ORDER — FLUCONAZOLE 150 MG/1
150 TABLET ORAL DAILY
Qty: 5 TABLET | Refills: 0 | Status: SHIPPED | OUTPATIENT
Start: 2018-02-09 | End: 2018-02-14

## 2018-02-09 NOTE — PROGRESS NOTES
HPI:    Patient ID: Jair Hernandez is a 32year old female. Patient presents for evaluation of rash for past week. She noticed a rough, discolored patch on her mid back and then noticed few other lesions higher up on back, chest and arms.   Lesions on 1 in by 1.5 in hyperpigmented, slightly erythematous rough patch right side mid back as marked. Few smaller (1 cm) lesions with same characteristics on upper back, chest.  Smaller lesions (5 mm) on flexor aspect of forearms. Vitals reviewed.

## 2018-02-19 ENCOUNTER — TELEPHONE (OUTPATIENT)
Dept: OTHER | Age: 28
End: 2018-02-19

## 2018-02-19 NOTE — TELEPHONE ENCOUNTER
Tania Alvarez: please advise. Patient asked if alternative tx is suggested? Patient continues with rash like spots on skin.   LOV 2/9/18; was prescribed ketoconazole cream.  Patient reported some improvement, \"skin has lightened up, skin tone returned

## 2018-02-19 NOTE — TELEPHONE ENCOUNTER
Spoke with patient who has noted smaller lesions on her legs. No pain or itching. Older lesions on chest, back and arms are clearing up. Advised patient to continue to monitor if any new lesions appear on torso or lesions on legs are not clearing up.

## 2018-04-09 ENCOUNTER — APPOINTMENT (OUTPATIENT)
Dept: CT IMAGING | Facility: HOSPITAL | Age: 28
End: 2018-04-09
Attending: EMERGENCY MEDICINE
Payer: MEDICAID

## 2018-04-09 ENCOUNTER — APPOINTMENT (OUTPATIENT)
Dept: GENERAL RADIOLOGY | Facility: HOSPITAL | Age: 28
End: 2018-04-09
Payer: MEDICAID

## 2018-04-09 ENCOUNTER — NURSE TRIAGE (OUTPATIENT)
Dept: OTHER | Age: 28
End: 2018-04-09

## 2018-04-09 ENCOUNTER — HOSPITAL ENCOUNTER (EMERGENCY)
Facility: HOSPITAL | Age: 28
Discharge: HOME | End: 2018-04-09
Attending: EMERGENCY MEDICINE
Payer: MEDICAID

## 2018-04-09 VITALS
BODY MASS INDEX: 23 KG/M2 | DIASTOLIC BLOOD PRESSURE: 90 MMHG | OXYGEN SATURATION: 99 % | SYSTOLIC BLOOD PRESSURE: 124 MMHG | HEART RATE: 82 BPM | RESPIRATION RATE: 16 BRPM | WEIGHT: 130 LBS | TEMPERATURE: 98 F

## 2018-04-09 DIAGNOSIS — J40 BRONCHITIS: Primary | ICD-10-CM

## 2018-04-09 PROCEDURE — 93005 ELECTROCARDIOGRAM TRACING: CPT

## 2018-04-09 PROCEDURE — 93010 ELECTROCARDIOGRAM REPORT: CPT | Performed by: INTERNAL MEDICINE

## 2018-04-09 PROCEDURE — 96374 THER/PROPH/DIAG INJ IV PUSH: CPT

## 2018-04-09 PROCEDURE — 99285 EMERGENCY DEPT VISIT HI MDM: CPT

## 2018-04-09 PROCEDURE — 85025 COMPLETE CBC W/AUTO DIFF WBC: CPT | Performed by: EMERGENCY MEDICINE

## 2018-04-09 PROCEDURE — 71260 CT THORAX DX C+: CPT | Performed by: EMERGENCY MEDICINE

## 2018-04-09 PROCEDURE — 71046 X-RAY EXAM CHEST 2 VIEWS: CPT | Performed by: EMERGENCY MEDICINE

## 2018-04-09 PROCEDURE — 80048 BASIC METABOLIC PNL TOTAL CA: CPT | Performed by: EMERGENCY MEDICINE

## 2018-04-09 PROCEDURE — 81025 URINE PREGNANCY TEST: CPT

## 2018-04-09 PROCEDURE — 85379 FIBRIN DEGRADATION QUANT: CPT | Performed by: EMERGENCY MEDICINE

## 2018-04-09 PROCEDURE — 84484 ASSAY OF TROPONIN QUANT: CPT | Performed by: EMERGENCY MEDICINE

## 2018-04-09 RX ORDER — ACETAMINOPHEN AND CODEINE PHOSPHATE 120; 12 MG/5ML; MG/5ML
5 SOLUTION ORAL EVERY 6 HOURS PRN
Qty: 90 ML | Refills: 0 | Status: SHIPPED | OUTPATIENT
Start: 2018-04-09 | End: 2018-04-16

## 2018-04-09 RX ORDER — KETOROLAC TROMETHAMINE 30 MG/ML
30 INJECTION, SOLUTION INTRAMUSCULAR; INTRAVENOUS ONCE
Status: COMPLETED | OUTPATIENT
Start: 2018-04-09 | End: 2018-04-09

## 2018-04-09 RX ORDER — POTASSIUM CHLORIDE 20 MEQ/1
40 TABLET, EXTENDED RELEASE ORAL ONCE
Status: COMPLETED | OUTPATIENT
Start: 2018-04-09 | End: 2018-04-09

## 2018-04-09 NOTE — ED PROVIDER NOTES
Patient Seen in: [facility]    History   Patient presents with:  Chest Pain Angina (cardiovascular)    Stated Complaint: chest pain and dizziness since saturday    HPI    71-year-old female presents for evaluation of chest ross appears well-developed and well-nourished. No distress. HENT:   Head: Normocephalic and atraumatic. Mouth/Throat: Oropharynx is clear and moist.   Eyes: Conjunctivae and EOM are normal.   Neck: Normal range of motion. Neck supple.    Cardiovascular: Nor ---------                               -----------         ------                     CBC W/ DIFFERENTIAL[[id number]]                              Final result                 Please view results for these tests on the individual orders.    CBC W/ DIF

## 2018-04-09 NOTE — ED INITIAL ASSESSMENT (HPI)
Pt presents to ED with congestion, cough, runny nose. States having chest discomfort r/t to coughing. Chest discomfort is stabbing pain under right ribcage with radiation to back.

## 2018-04-09 NOTE — TELEPHONE ENCOUNTER
Action Requested: Summary for Provider     []  Critical Lab, Recommendations Needed  [] Need Additional Advice  []   FYI    []   Need Orders  [] Need Medications Sent to Pharmacy  []  Other     SUMMARY: Advised to go to the emergency room now; she agreed,

## 2018-04-10 ENCOUNTER — OFFICE VISIT (OUTPATIENT)
Dept: FAMILY MEDICINE CLINIC | Facility: CLINIC | Age: 28
End: 2018-04-10

## 2018-04-10 VITALS
SYSTOLIC BLOOD PRESSURE: 129 MMHG | DIASTOLIC BLOOD PRESSURE: 93 MMHG | TEMPERATURE: 98 F | BODY MASS INDEX: 26 KG/M2 | HEART RATE: 84 BPM | WEIGHT: 144 LBS

## 2018-04-10 DIAGNOSIS — N94.9 VAGINAL DISCOMFORT: ICD-10-CM

## 2018-04-10 DIAGNOSIS — N76.0 ACUTE VAGINITIS: Primary | ICD-10-CM

## 2018-04-10 PROCEDURE — 99212 OFFICE O/P EST SF 10 MIN: CPT | Performed by: PHYSICIAN ASSISTANT

## 2018-04-10 PROCEDURE — 99213 OFFICE O/P EST LOW 20 MIN: CPT | Performed by: PHYSICIAN ASSISTANT

## 2018-04-10 PROCEDURE — 81002 URINALYSIS NONAUTO W/O SCOPE: CPT | Performed by: PHYSICIAN ASSISTANT

## 2018-04-10 RX ORDER — FLUCONAZOLE 150 MG/1
TABLET ORAL
Qty: 2 TABLET | Refills: 0 | Status: SHIPPED | OUTPATIENT
Start: 2018-04-10 | End: 2018-04-16

## 2018-04-10 NOTE — PROGRESS NOTES
HPI:    Patient ID: Pillo Elizalde is a 32year old female. Patient presents for vaginal discomfort worse with intercourse for past few days. She has noticed increased white milky discharge that became clumpy today. She denies any odor.   She denies is no tenderness. Genitourinary: There is no lesion on the right labia. There is no lesion on the left labia. No erythema or tenderness in the vagina. Vaginal discharge (copious white) found.    Neurological: She is alert and oriented to person, place, an

## 2018-04-11 NOTE — TELEPHONE ENCOUNTER
Pt returned call but I apologized to her for the call today as noted pt was already seen yesterday for ER f/u by [facility]. Pt agrees and denies further concerns.

## 2018-04-14 RX ORDER — FLUCONAZOLE 150 MG/1
TABLET ORAL
Qty: 2 TABLET | Refills: 0 | Status: CANCELLED | OUTPATIENT
Start: 2018-04-14

## 2018-04-16 ENCOUNTER — TELEPHONE (OUTPATIENT)
Dept: OTHER | Age: 28
End: 2018-04-16

## 2018-04-16 RX ORDER — FLUCONAZOLE 150 MG/1
TABLET ORAL
Qty: 2 TABLET | Refills: 0 | Status: SHIPPED | OUTPATIENT
Start: 2018-04-16 | End: 2018-06-04 | Stop reason: ALTCHOICE

## 2018-04-16 NOTE — TELEPHONE ENCOUNTER
Rx approved and sent to pharmacy. Follow up if symptoms still persist.  Vaginal culture was positive for yeast infection.

## 2018-04-16 NOTE — TELEPHONE ENCOUNTER
Pt completed both diflucan therapy as prescribed. Symptoms not improved and worsening.   Discharge worse, no odor, uncomfortablel  Requesting more diflucan refill

## 2018-06-04 ENCOUNTER — OFFICE VISIT (OUTPATIENT)
Dept: FAMILY MEDICINE CLINIC | Facility: CLINIC | Age: 28
End: 2018-06-04

## 2018-06-04 VITALS
SYSTOLIC BLOOD PRESSURE: 115 MMHG | DIASTOLIC BLOOD PRESSURE: 78 MMHG | TEMPERATURE: 98 F | WEIGHT: 149 LBS | HEART RATE: 72 BPM | BODY MASS INDEX: 27 KG/M2

## 2018-06-04 DIAGNOSIS — J02.9 ACUTE PHARYNGITIS, UNSPECIFIED ETIOLOGY: Primary | ICD-10-CM

## 2018-06-04 PROCEDURE — 87880 STREP A ASSAY W/OPTIC: CPT | Performed by: PHYSICIAN ASSISTANT

## 2018-06-04 PROCEDURE — 99212 OFFICE O/P EST SF 10 MIN: CPT | Performed by: PHYSICIAN ASSISTANT

## 2018-06-04 PROCEDURE — 99213 OFFICE O/P EST LOW 20 MIN: CPT | Performed by: PHYSICIAN ASSISTANT

## 2018-06-04 RX ORDER — AMOXICILLIN AND CLAVULANATE POTASSIUM 875; 125 MG/1; MG/1
1 TABLET, FILM COATED ORAL 2 TIMES DAILY
Qty: 14 TABLET | Refills: 0 | Status: SHIPPED | OUTPATIENT
Start: 2018-06-04 | End: 2018-06-25 | Stop reason: ALTCHOICE

## 2018-06-04 NOTE — PROGRESS NOTES
HPI:    Patient ID: Nori Villalba is a 32year old female. Patient presents for sore throat and pain in both ears for past one week. She has had associated headache at bilateral temples. She denies fever or chills.  She denies congestion or sinus pa Conjunctivae are normal. Pupils are equal, round, and reactive to light. Neck: Neck supple. Cardiovascular: Normal rate, regular rhythm and normal heart sounds.     Pulmonary/Chest: Effort normal and breath sounds normal.   Lymphadenopathy:     She has

## 2018-06-08 ENCOUNTER — TELEPHONE (OUTPATIENT)
Dept: OTHER | Age: 28
End: 2018-06-08

## 2018-06-08 RX ORDER — FLUCONAZOLE 150 MG/1
TABLET ORAL
Qty: 2 TABLET | Refills: 1 | Status: SHIPPED | OUTPATIENT
Start: 2018-06-08 | End: 2018-06-25 | Stop reason: ALTCHOICE

## 2018-06-08 NOTE — TELEPHONE ENCOUNTER
Pt informed of Rx sent and instructions as JH noted below. Pt agrees and denies further questions/concerns at this time.

## 2018-06-08 NOTE — TELEPHONE ENCOUNTER
Spoke with patient and reports symptoms of yeast infection-states dryness, irritation amd vaginal discharge-denies odor) despite taking probiotics with her Augmentin. Patient requesting Diflucan to pharmacy--4 pills.     Patient also asking [facility] if she sergey

## 2018-06-08 NOTE — TELEPHONE ENCOUNTER
Message noted. Diflucan sent to pharmacy. She should take one tablet now and repeat if any symptoms persist at end of course of antibiotic.

## 2018-06-25 ENCOUNTER — OFFICE VISIT (OUTPATIENT)
Dept: FAMILY MEDICINE CLINIC | Facility: CLINIC | Age: 28
End: 2018-06-25

## 2018-06-25 VITALS
HEART RATE: 75 BPM | WEIGHT: 149 LBS | TEMPERATURE: 99 F | BODY MASS INDEX: 27 KG/M2 | DIASTOLIC BLOOD PRESSURE: 84 MMHG | SYSTOLIC BLOOD PRESSURE: 124 MMHG

## 2018-06-25 DIAGNOSIS — K64.4 EXTERNAL HEMORRHOIDS: ICD-10-CM

## 2018-06-25 DIAGNOSIS — N76.0 RECURRENT VAGINITIS: Primary | ICD-10-CM

## 2018-06-25 PROCEDURE — 99213 OFFICE O/P EST LOW 20 MIN: CPT | Performed by: PHYSICIAN ASSISTANT

## 2018-06-25 PROCEDURE — 99212 OFFICE O/P EST SF 10 MIN: CPT | Performed by: PHYSICIAN ASSISTANT

## 2018-06-25 NOTE — TELEPHONE ENCOUNTER
Pt stated she was not better, irritation around perineal area ,no discharge, advised appt-made for today

## 2018-06-26 NOTE — PROGRESS NOTES
HPI:    Patient ID: Narcisa Teixeira is a 32year old female. Patient presents for recurring vaginal discomfort. Patient states two days ago felt something was \"not right\" with her vaginal area.   She felt bulging vaginally and states did not look nor well-nourished. No distress. Abdominal: Soft. Bowel sounds are normal. She exhibits no distension. There is no tenderness. Genitourinary: There is no rash, tenderness or lesion on the right labia.  There is no rash, tenderness or lesion on the left labi

## 2018-06-28 RX ORDER — NORGESTIMATE-ETHINYL ESTRADIOL 7DAYSX3 28
TABLET ORAL
Qty: 1 PACKAGE | Refills: 2 | Status: SHIPPED | OUTPATIENT
Start: 2018-06-28 | End: 2019-01-12

## 2018-06-28 NOTE — TELEPHONE ENCOUNTER
From: Diogo Bautista  Sent: 6/28/2018 3:26 AM CDT  Subject: Medication Renewal Request    Fabien Hall would like a refill of the following medications:     HYDROcodone-acetaminophen 5-325 MG Oral Tab [Radha Jimenez PA-C]     JEFFREY, 28, 0.18/0

## 2018-06-28 NOTE — TELEPHONE ENCOUNTER
Please advise on refill request. Last script 1/13/18    Please respond to pool: BEKA Anthony 62 LPN/CMA    Refill Protocol Appointment Criteria  · Appointment scheduled in the past 6 months or in the next 3 months  Recent Outpatient Visits            3 days ago

## 2018-06-29 RX ORDER — HYDROCODONE BITARTRATE AND ACETAMINOPHEN 5; 325 MG/1; MG/1
1 TABLET ORAL EVERY 4 HOURS PRN
Qty: 30 TABLET | Refills: 0 | OUTPATIENT
Start: 2018-06-29

## 2018-06-29 RX ORDER — KETOCONAZOLE 20 MG/G
CREAM TOPICAL
Qty: 30 G | Refills: 1 | Status: SHIPPED
Start: 2018-06-29 | End: 2020-04-18

## 2018-06-29 RX ORDER — METRONIDAZOLE 500 MG/1
500 TABLET ORAL 2 TIMES DAILY
Qty: 14 TABLET | Refills: 0 | Status: SHIPPED | OUTPATIENT
Start: 2018-06-29 | End: 2018-10-19 | Stop reason: ALTCHOICE

## 2018-07-03 RX ORDER — HYDROCODONE BITARTRATE AND ACETAMINOPHEN 5; 325 MG/1; MG/1
1 TABLET ORAL EVERY 4 HOURS PRN
Qty: 30 TABLET | Refills: 0 | OUTPATIENT
Start: 2018-07-03

## 2018-07-03 RX ORDER — HYDROCODONE BITARTRATE AND ACETAMINOPHEN 5; 325 MG/1; MG/1
1 TABLET ORAL EVERY 4 HOURS PRN
Qty: 30 TABLET | Refills: 0 | Status: SHIPPED | OUTPATIENT
Start: 2018-07-03 | End: 2018-10-19 | Stop reason: ALTCHOICE
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
## 2018-07-03 NOTE — TELEPHONE ENCOUNTER
Attempted to reach patient, no answer voicemail left informing patient rx script for norco is ready for  at the .

## 2018-07-03 NOTE — TELEPHONE ENCOUNTER
Ivone Jiemnez,    Pt informed of Bradfordsie Zaid message below norco refused. Stated the 800mg of ibuprofen is not working. Requesting alternative pain medication.  See Norma Araujo RN message below for this request.    Please reply to camilla: BEKA Palacios

## 2018-10-19 ENCOUNTER — OFFICE VISIT (OUTPATIENT)
Dept: FAMILY MEDICINE CLINIC | Facility: CLINIC | Age: 28
End: 2018-10-19
Payer: MEDICAID

## 2018-10-19 VITALS
WEIGHT: 150.81 LBS | HEART RATE: 77 BPM | DIASTOLIC BLOOD PRESSURE: 86 MMHG | BODY MASS INDEX: 27.75 KG/M2 | HEIGHT: 62 IN | TEMPERATURE: 98 F | SYSTOLIC BLOOD PRESSURE: 123 MMHG

## 2018-10-19 DIAGNOSIS — N76.0 OTHER VAGINITIS: Primary | ICD-10-CM

## 2018-10-19 PROCEDURE — 99213 OFFICE O/P EST LOW 20 MIN: CPT | Performed by: FAMILY MEDICINE

## 2018-10-19 PROCEDURE — 99212 OFFICE O/P EST SF 10 MIN: CPT | Performed by: FAMILY MEDICINE

## 2018-10-19 RX ORDER — METRONIDAZOLE 500 MG/1
500 TABLET ORAL 2 TIMES DAILY
Qty: 14 TABLET | Refills: 0 | Status: SHIPPED | OUTPATIENT
Start: 2018-10-19 | End: 2020-04-18

## 2018-10-19 NOTE — PROGRESS NOTES
HPI:    Patient ID: Nickie Ornelas is a 29year old female. Complaining about odorous vaginal discharge. No pelvic pain no fever. Partner no symptoms        Review of Systems   Constitutional: Negative.   Negative for activity change, appetite change times daily.        Imaging & Referrals:  None       #[id number]

## 2018-10-22 ENCOUNTER — TELEPHONE (OUTPATIENT)
Dept: FAMILY MEDICINE CLINIC | Facility: CLINIC | Age: 28
End: 2018-10-22

## 2018-10-22 NOTE — TELEPHONE ENCOUNTER
----- Message from Gonzalo Galicia MD sent at 10/22/2018  3:28 PM CDT -----  Results normal. Please call patient and send results.  Continue present management

## 2018-10-24 ENCOUNTER — OFFICE VISIT (OUTPATIENT)
Dept: OBGYN CLINIC | Facility: CLINIC | Age: 28
End: 2018-10-24
Payer: MEDICAID

## 2018-10-24 VITALS
WEIGHT: 150 LBS | BODY MASS INDEX: 27 KG/M2 | DIASTOLIC BLOOD PRESSURE: 87 MMHG | SYSTOLIC BLOOD PRESSURE: 127 MMHG | HEART RATE: 70 BPM

## 2018-10-24 DIAGNOSIS — N89.8 VAGINAL DISCHARGE: Primary | ICD-10-CM

## 2018-10-24 PROCEDURE — 99213 OFFICE O/P EST LOW 20 MIN: CPT | Performed by: CLINICAL NURSE SPECIALIST

## 2018-11-01 ENCOUNTER — TELEPHONE (OUTPATIENT)
Dept: OBGYN CLINIC | Facility: CLINIC | Age: 28
End: 2018-11-01

## 2018-11-01 NOTE — TELEPHONE ENCOUNTER
----- Message from DEMETRIA Dunaway sent at 11/1/2018  8:26 AM CDT -----  Please let pt know vaginal culture is negative for infection.     MAF

## 2018-11-01 NOTE — PROGRESS NOTES
Emily Montoya is a 29year old female [id number] Patient's last menstrual period was 10/03/2018. Patient presents with:  Gyn Problem: F/u vaginal d/c, odor  Currently being treated with Flagyl for BV. Wants repeat testing.  Aware I recommend she wait until Special Diet: Not Asked        Back Care: Not Asked        Exercise: Not Asked        Bike Helmet: Not Asked        Seat Belt: Not Asked        Self-Exams: Not Asked    Social History Narrative      Not on file      MEDICATIONS:    Current Outpatient Medic trying condoms or withdrawal, as semen can chance pH of vagina, leading to BV since she feels infections are always after sex  Vulvar skin care guidelines discussed

## 2019-01-12 ENCOUNTER — APPOINTMENT (OUTPATIENT)
Dept: LAB | Age: 29
End: 2019-01-12
Attending: FAMILY MEDICINE
Payer: MEDICAID

## 2019-01-12 ENCOUNTER — OFFICE VISIT (OUTPATIENT)
Dept: FAMILY MEDICINE CLINIC | Facility: CLINIC | Age: 29
End: 2019-01-12
Payer: MEDICAID

## 2019-01-12 VITALS
HEART RATE: 71 BPM | BODY MASS INDEX: 27.97 KG/M2 | TEMPERATURE: 98 F | RESPIRATION RATE: 18 BRPM | HEIGHT: 62 IN | DIASTOLIC BLOOD PRESSURE: 87 MMHG | WEIGHT: 152 LBS | SYSTOLIC BLOOD PRESSURE: 128 MMHG

## 2019-01-12 DIAGNOSIS — Z00.00 ROUTINE PHYSICAL EXAMINATION: ICD-10-CM

## 2019-01-12 LAB
ALBUMIN SERPL BCP-MCNC: 3.6 G/DL (ref 3.5–4.8)
ALBUMIN/GLOB SERPL: 0.9 {RATIO} (ref 1–2)
ALP SERPL-CCNC: 52 U/L (ref 32–100)
ALT SERPL-CCNC: 17 U/L (ref 14–54)
ANION GAP SERPL CALC-SCNC: 9 MMOL/L (ref 0–18)
AST SERPL-CCNC: 17 U/L (ref 15–41)
BILIRUB SERPL-MCNC: 1 MG/DL (ref 0.3–1.2)
BUN SERPL-MCNC: 4 MG/DL (ref 8–20)
BUN/CREAT SERPL: 4.7 (ref 10–20)
CALCIUM SERPL-MCNC: 9 MG/DL (ref 8.5–10.5)
CHLORIDE SERPL-SCNC: 105 MMOL/L (ref 95–110)
CHOLEST SERPL-MCNC: 213 MG/DL (ref 110–200)
CO2 SERPL-SCNC: 23 MMOL/L (ref 22–32)
CREAT SERPL-MCNC: 0.86 MG/DL (ref 0.5–1.5)
ERYTHROCYTE [DISTWIDTH] IN BLOOD BY AUTOMATED COUNT: 12.6 % (ref 11–15)
GLOBULIN PLAS-MCNC: 4 G/DL (ref 2.5–3.7)
GLUCOSE SERPL-MCNC: 82 MG/DL (ref 70–99)
HCT VFR BLD AUTO: 38.5 % (ref 35–48)
HDLC SERPL-MCNC: 88 MG/DL
HGB BLD-MCNC: 12.8 G/DL (ref 12–16)
LDLC SERPL CALC-MCNC: 111 MG/DL (ref 0–99)
MCH RBC QN AUTO: 30 PG (ref 27–32)
MCHC RBC AUTO-ENTMCNC: 33.2 G/DL (ref 32–37)
MCV RBC AUTO: 90.2 FL (ref 80–100)
NONHDLC SERPL-MCNC: 125 MG/DL
OSMOLALITY UR CALC.SUM OF ELEC: 280 MOSM/KG (ref 275–295)
PATIENT FASTING: YES
PLATELET # BLD AUTO: 235 K/UL (ref 140–400)
PMV BLD AUTO: 8.5 FL (ref 7.4–10.3)
POTASSIUM SERPL-SCNC: 4 MMOL/L (ref 3.3–5.1)
PROT SERPL-MCNC: 7.6 G/DL (ref 5.9–8.4)
RBC # BLD AUTO: 4.27 M/UL (ref 3.7–5.4)
SODIUM SERPL-SCNC: 137 MMOL/L (ref 136–144)
TRIGL SERPL-MCNC: 69 MG/DL (ref 1–149)
TSH SERPL-ACNC: 1.12 UIU/ML (ref 0.45–5.33)
WBC # BLD AUTO: 5.4 K/UL (ref 4–11)

## 2019-01-12 PROCEDURE — 99395 PREV VISIT EST AGE 18-39: CPT | Performed by: FAMILY MEDICINE

## 2019-01-12 PROCEDURE — 85027 COMPLETE CBC AUTOMATED: CPT

## 2019-01-12 PROCEDURE — 80053 COMPREHEN METABOLIC PANEL: CPT

## 2019-01-12 PROCEDURE — 84443 ASSAY THYROID STIM HORMONE: CPT

## 2019-01-12 PROCEDURE — 36415 COLL VENOUS BLD VENIPUNCTURE: CPT

## 2019-01-12 PROCEDURE — 80061 LIPID PANEL: CPT

## 2019-01-12 RX ORDER — NORGESTIMATE-ETHINYL ESTRADIOL 7DAYSX3 28
TABLET ORAL
Qty: 1 PACKAGE | Refills: 12 | Status: SHIPPED | OUTPATIENT
Start: 2019-01-12 | End: 2019-10-21

## 2019-01-12 NOTE — PROGRESS NOTES
HPI:    Patient ID: Flash Prieto is a 29year old female. Patient is here for routine physical exam. No acute issues. No significant chronic medical problems. Patient is requesting testing. Diet and exercise have been good.  Past medical history, fam ASSESSMENT/PLAN:   Routine physical examination:  - Exam is unremarkable. Screening tests were discussed, and after discussion, will check lab work as below. Healthy diet, exercise, and weight were discussed.  To call if problems and follow up and further

## 2019-01-13 RX ORDER — NORGESTIMATE AND ETHINYL ESTRADIOL 7DAYSX3 28
KIT ORAL
Qty: 28 TABLET | Refills: 0 | OUTPATIENT
Start: 2019-01-13

## 2019-01-21 ENCOUNTER — TELEPHONE (OUTPATIENT)
Dept: OTHER | Age: 29
End: 2019-01-21

## 2019-01-21 NOTE — TELEPHONE ENCOUNTER
Pt calling for test results, verified name and . Pt states she has not looked in [employer] for results, states doctor left her a VM message. Reviewed test results and recommendations as noted below.  Pt states at her OV she talked to the doctor about being

## 2019-01-22 NOTE — TELEPHONE ENCOUNTER
Message noted; CBC normal and no anemia. Iron level not needed for this. May need to eat generally a good and adequate diet.

## 2019-01-22 NOTE — TELEPHONE ENCOUNTER
Patient contacted (Name and  of pt verified). All results and recommendations reviewed. Patient verbalizes understanding, denies further questions and agrees with plan of care.

## 2019-02-11 RX ORDER — IBUPROFEN 800 MG/1
TABLET ORAL
Qty: 60 TABLET | Refills: 0 | Status: SHIPPED | OUTPATIENT
Start: 2019-02-11 | End: 2019-03-09

## 2019-02-12 NOTE — TELEPHONE ENCOUNTER
Refill passed per [facility] protocol.   Refill Protocol Appointment Criteria  · Appointment scheduled in the past 6 months or in the next 3 months  Recent Outpatient Visits            1 month ago Routine physical examination    Sherrie Ruggiero

## 2019-02-23 ENCOUNTER — NURSE TRIAGE (OUTPATIENT)
Dept: OTHER | Age: 29
End: 2019-02-23

## 2019-02-23 NOTE — TELEPHONE ENCOUNTER
Action Requested: Summary for Provider     []  Critical Lab, Recommendations Needed  [] Need Additional Advice  []   FYI    []   Need Orders  [] Need Medications Sent to Pharmacy  []  Other     SUMMARY: Spoke with patient who reports for the past 3 days sh

## 2019-02-25 ENCOUNTER — NURSE ONLY (OUTPATIENT)
Dept: FAMILY MEDICINE CLINIC | Facility: CLINIC | Age: 29
End: 2019-02-25
Payer: MEDICAID

## 2019-02-25 VITALS — DIASTOLIC BLOOD PRESSURE: 86 MMHG | TEMPERATURE: 99 F | SYSTOLIC BLOOD PRESSURE: 124 MMHG

## 2019-02-25 DIAGNOSIS — R05.9 COUGH: ICD-10-CM

## 2019-02-25 DIAGNOSIS — J02.0 STREP THROAT: Primary | ICD-10-CM

## 2019-02-25 LAB
CONTROL LINE PRESENT WITH A CLEAR BACKGROUND (YES/NO): YES YES/NO
STREP GRP A CUL-SCR: POSITIVE

## 2019-02-25 PROCEDURE — 99213 OFFICE O/P EST LOW 20 MIN: CPT | Performed by: NURSE PRACTITIONER

## 2019-02-25 PROCEDURE — 87880 STREP A ASSAY W/OPTIC: CPT | Performed by: NURSE PRACTITIONER

## 2019-02-25 RX ORDER — AMOXICILLIN 500 MG/1
500 CAPSULE ORAL 2 TIMES DAILY
Qty: 20 CAPSULE | Refills: 0 | Status: SHIPPED | OUTPATIENT
Start: 2019-02-25 | End: 2019-03-07

## 2019-02-25 RX ORDER — FLUCONAZOLE 150 MG/1
150 TABLET ORAL ONCE
Qty: 2 TABLET | Refills: 0 | Status: SHIPPED | OUTPATIENT
Start: 2019-02-25 | End: 2019-02-25

## 2019-02-25 RX ORDER — BENZONATATE 100 MG/1
100 CAPSULE ORAL 3 TIMES DAILY PRN
Qty: 21 CAPSULE | Refills: 0 | Status: SHIPPED | OUTPATIENT
Start: 2019-02-25 | End: 2019-03-04

## 2019-02-25 NOTE — PATIENT INSTRUCTIONS
Pharyngitis: Strep (Confirmed)    You have had a positive test for strep throat. Strep throat is a contagious illness. It is spread by coughing, kissing or by touching others after touching your mouth or nose.  Symptoms include throat pain that is worse w · Lymph nodes getting larger or becoming soft in the middle  · You can't swallow liquids or you can't open your mouth wide because of throat pain  · Signs of dehydration. These include very dark urine or no urine, sunken eyes, and dizziness.   · Trouble cat

## 2019-02-25 NOTE — PROGRESS NOTES
CHIEF COMPLAINT:   Patient presents with:  Sore Throat      HPI:   Narcisa Teixeira is a 29year old female presents to clinic with symptoms of sore throat. Sore throat and lost of voice for 5 days  Feeling of glass to the throat.  Pain to throat 10/10  Dis Alcohol/week: 0.0 oz      Comment: none    Drug use: No      Comment: none       REVIEW OF SYSTEMS:   GENERAL HEALTH:  See HPI  SKIN: denies any unusual skin lesions or rashes  HEENT: denies ear pain, See HPI  RESPIRATORY: denies shortness of breath, o -     fluconazole (DIFLUCAN) 150 MG Oral Tab; Take 1 tablet (150 mg total) by mouth once for 1 dose. Repeat in 72 hours if symptoms persist        Risks, benefits, complications and side effects of meds discussed with patient. OTC Tylenol/Motrin prn.  P · Soft foods are OK. Don't eat salty or spicy foods. Follow-up care  Follow up with your healthcare provider or our staff if you don't get better over the next week.   When to seek medical advice  Call your healthcare provider right away if any of these oc

## 2019-03-06 ENCOUNTER — OFFICE VISIT (OUTPATIENT)
Dept: OBGYN CLINIC | Facility: CLINIC | Age: 29
End: 2019-03-06
Payer: MEDICAID

## 2019-03-06 VITALS
HEART RATE: 78 BPM | DIASTOLIC BLOOD PRESSURE: 96 MMHG | BODY MASS INDEX: 28 KG/M2 | WEIGHT: 154 LBS | SYSTOLIC BLOOD PRESSURE: 137 MMHG

## 2019-03-06 DIAGNOSIS — Z01.419 ENCOUNTER FOR GYNECOLOGICAL EXAMINATION WITHOUT ABNORMAL FINDING: Primary | ICD-10-CM

## 2019-03-06 DIAGNOSIS — Z30.09 ENCOUNTER FOR GENERAL COUNSELING AND ADVICE ON CONTRACEPTIVE MANAGEMENT: ICD-10-CM

## 2019-03-06 PROCEDURE — 99395 PREV VISIT EST AGE 18-39: CPT | Performed by: OBSTETRICS & GYNECOLOGY

## 2019-03-10 NOTE — TELEPHONE ENCOUNTER
Please advise in regards to refill request. Thank You  Refill Protocol Appointment Criteria  · Appointment scheduled in the past 6 months or in the next 3 months  Recent Outpatient Visits            3 days ago     [facility] for [place], [place]

## 2019-03-11 RX ORDER — IBUPROFEN 800 MG/1
TABLET ORAL
Qty: 60 TABLET | Refills: 0 | Status: SHIPPED | OUTPATIENT
Start: 2019-03-11 | End: 2019-03-25

## 2019-03-13 RX ORDER — FLUCONAZOLE 150 MG/1
TABLET ORAL
Qty: 2 TABLET | Refills: 0 | OUTPATIENT
Start: 2019-03-13

## 2019-03-17 NOTE — PROGRESS NOTES
HPI:    Patient ID: Joycelyn Ziegler is a 29year old female. HPI  [id number] with regular menses on OCP for BC. She is in 9 year relationship and son, Joseph Grant, is 12 y/o. She wants TL. She has not discussed this with partner.  They are engaged to be  Abdominal: Soft. She exhibits no distension and no mass. There is no tenderness. There is no rebound and no guarding. Genitourinary: Vagina normal and uterus normal. No breast discharge. There is no rash or lesion on the right labia.  There is no rash o

## 2019-03-25 RX ORDER — IBUPROFEN 800 MG/1
TABLET ORAL
Qty: 60 TABLET | Refills: 0 | Status: SHIPPED | OUTPATIENT
Start: 2019-03-25 | End: 2019-04-10

## 2019-03-27 RX ORDER — FLUCONAZOLE 150 MG/1
TABLET ORAL
Qty: 2 TABLET | Refills: 0 | Status: SHIPPED | OUTPATIENT
Start: 2019-03-27 | End: 2020-04-18

## 2019-03-27 NOTE — TELEPHONE ENCOUNTER
Pt requesting medication for yeast infection. Per pt c/o Vaginal irritation. Pt states she gets yeast infections after finishing her menses. Pt tried OTC cream but still feels some irritation. Denies odor, redness, or itchiness. Please advise.

## 2019-03-27 NOTE — TELEPHONE ENCOUNTER
Message noted. May refill medication as requested. Erx sent to listed pharmacy.  To follow up for appointment if not better; Please notify patient

## 2019-04-08 ENCOUNTER — OFFICE VISIT (OUTPATIENT)
Dept: FAMILY MEDICINE CLINIC | Facility: CLINIC | Age: 29
End: 2019-04-08
Payer: MEDICAID

## 2019-04-08 VITALS
RESPIRATION RATE: 14 BRPM | OXYGEN SATURATION: 98 % | SYSTOLIC BLOOD PRESSURE: 132 MMHG | HEART RATE: 76 BPM | DIASTOLIC BLOOD PRESSURE: 80 MMHG | TEMPERATURE: 98 F

## 2019-04-08 DIAGNOSIS — J01.10 ACUTE NON-RECURRENT FRONTAL SINUSITIS: Primary | ICD-10-CM

## 2019-04-08 DIAGNOSIS — J20.9 ACUTE BRONCHITIS, UNSPECIFIED ORGANISM: ICD-10-CM

## 2019-04-08 PROCEDURE — 99213 OFFICE O/P EST LOW 20 MIN: CPT | Performed by: PHYSICIAN ASSISTANT

## 2019-04-08 RX ORDER — BENZONATATE 200 MG/1
200 CAPSULE ORAL 3 TIMES DAILY PRN
Qty: 20 CAPSULE | Refills: 0 | Status: SHIPPED | OUTPATIENT
Start: 2019-04-08 | End: 2020-04-18

## 2019-04-08 RX ORDER — AZITHROMYCIN 250 MG/1
TABLET, FILM COATED ORAL
Qty: 6 TABLET | Refills: 0 | Status: SHIPPED | OUTPATIENT
Start: 2019-04-08 | End: 2020-04-18

## 2019-04-08 NOTE — PATIENT INSTRUCTIONS
Home Care    · Take azithromycin as prescribed. · Flonase or Nasacort OTC for nasal symptoms as instructed  · Follow up with your health care provider if your symptoms do not improve in 3-5 days.    · You can use an over-the-counter decongestant.  [facility] Use back up birth control for pregnancy prevention for up to 1 month due to antibiotic use    Probiotics or yogurt daily during antibioitic use will help decrease stomach upset and restore good bacteria to the gut.   Take the probiotic at least 2 -3 hours a include:  · Wheezing  · Chest discomfort  · Shortness of breath  · Mild fever  A second infection, this time due to bacteria, may then occur.  And airways irritated by allergens or smoke are more likely to get infected.        Inflamed airway: Inflammation fever medicine, or a decongestant. Antibiotics  Most cases of bronchitis are caused by cold or flu viruses. They don’t need antibiotics to treat them, even if your mucus is thick and green or yellow.  Antibiotics don’t treat viral illness and antibiotics h [address] [facility], [address] [place] [address]. All rights reserved. This information is not intended as a substitute for professional medical care. Always follow your healthcare professional's instructions.

## 2019-04-08 NOTE — PROGRESS NOTES
Patient presents with:  Cough: chest congestion x 3 weeks, green mucous x 2 weeks, chills    HPI:   Shayan Gasca is a 29year old female who presents for sinus congestion and cough for  3  weeks.  Cough started gradually, hacking, deep, productive of th -induced ; 2014-\"[place]" 1st degree perineal laceration - vaginal wall. Lexi was a full term live born 7 pound 9 ounce male delivered by .    • Subjective visual disturbance     OU      Social History:  Soc PLAN:  Increase fluids, rest.  Reviewed meds and instructions as below with patient. Risks, benefits, and side effects of medication explained and discussed. The patient is asked to follow-up if no improvement in 5-7  days or sooner if sx worsen.   The pa · Humidify the air. Steam inhalation and warm compresses often help relieve pressure  · Drink plenty of water, hot tea, and other liquids. This may help thin nasal mucus. It also may help your sinuses drain fluids.     If you were prescribed an antibiotic: Acute bronchitis is when the airways in your lungs (bronchial tubes) become red and swollen (inflamed). It is usually caused by a viral infection. But it can also occur because of a bacteria or allergen.  Symptoms include a cough that produces yellow or gre · A chest X-ray. This is done if your healthcare provider thinks you have pneumonia. · Tests to check for an underlying condition. Other tests may be done to check for things such as allergies, asthma, or COPD (chronic obstructive pulmonary disease).  You · Take the medicines as directed. For instance, some medicines should be taken with food. · Ask about side effects. Ask your provider or pharmacist what side effects are common, and what to do about them.   Follow-up care  You should see your provider dean

## 2019-04-09 ENCOUNTER — HOSPITAL ENCOUNTER (EMERGENCY)
Facility: HOSPITAL | Age: 29
Discharge: HOME | End: 2019-04-09
Attending: EMERGENCY MEDICINE
Payer: MEDICAID

## 2019-04-09 VITALS
RESPIRATION RATE: 16 BRPM | TEMPERATURE: 98 F | OXYGEN SATURATION: 99 % | SYSTOLIC BLOOD PRESSURE: 127 MMHG | DIASTOLIC BLOOD PRESSURE: 90 MMHG | HEART RATE: 76 BPM

## 2019-04-09 DIAGNOSIS — J40 BRONCHITIS: ICD-10-CM

## 2019-04-09 DIAGNOSIS — T78.40XA ALLERGIC REACTION, INITIAL ENCOUNTER: Primary | ICD-10-CM

## 2019-04-09 DIAGNOSIS — J01.90 ACUTE NON-RECURRENT SINUSITIS, UNSPECIFIED LOCATION: ICD-10-CM

## 2019-04-09 PROCEDURE — 99283 EMERGENCY DEPT VISIT LOW MDM: CPT

## 2019-04-09 RX ORDER — DIPHENHYDRAMINE HCL 25 MG
50 CAPSULE ORAL EVERY 6 HOURS PRN
Qty: 24 CAPSULE | Refills: 0 | Status: SHIPPED | OUTPATIENT
Start: 2019-04-09 | End: 2019-04-12

## 2019-04-09 RX ORDER — FAMOTIDINE 20 MG/1
20 TABLET ORAL DAILY
Qty: 7 TABLET | Refills: 0 | Status: SHIPPED | OUTPATIENT
Start: 2019-04-09 | End: 2019-04-16

## 2019-04-09 RX ORDER — DIPHENHYDRAMINE HCL 25 MG
25 CAPSULE ORAL ONCE
Status: COMPLETED | OUTPATIENT
Start: 2019-04-09 | End: 2019-04-09

## 2019-04-09 RX ORDER — PREDNISONE 20 MG/1
40 TABLET ORAL DAILY
Qty: 6 TABLET | Refills: 0 | Status: SHIPPED | OUTPATIENT
Start: 2019-04-09 | End: 2019-04-14

## 2019-04-09 RX ORDER — DOXYCYCLINE HYCLATE 100 MG/1
100 CAPSULE ORAL 2 TIMES DAILY
Qty: 20 CAPSULE | Refills: 0 | Status: SHIPPED | OUTPATIENT
Start: 2019-04-09 | End: 2019-04-19

## 2019-04-09 NOTE — ED NOTES
Patient cleared for discharge by MD. Myrons with patient. Patient discharge instructions reviewed with patient including when and how to follow up  with healthcare provider and when to seek medical treatment. Medication use and prescriptions reviewed.

## 2019-04-09 NOTE — ED PROVIDER NOTES
Patient Seen in: [facility] AND [facility] Emergency Department    History   Patient presents with:  Medication Reaction    Stated Complaint: medication rxn    HPI    72-year-old female stating she went to immediate care yesterday and was prescribed azithromycin and time. Appears well-developed. No distress. Head: Normocephalic and atraumatic. Eyes: Conjunctivae are normal. Pupils are equal, round, and reactive to light. T: Mild upper lip swelling. No other signs of angioedema. Mild uvular edema.   Uvula i (40 mg total) by mouth daily for 5 days. , Normal, Disp-6 tablet, R-0    diphenhydrAMINE HCl 25 MG Oral Cap  Take 2 capsules (50 mg total) by mouth every 6 (six) hours as needed for Itching., Normal, Disp-24 capsule, R-0    famoTIDine (PEPCID) 20 MG Oral Ta

## 2019-04-09 NOTE — ED INITIAL ASSESSMENT (HPI)
Took 1st dose of zpack yesterday. Today woke with swelling lips and nose. Gums and tongue tingling and sore throat.  No rash or hives

## 2019-04-09 NOTE — ED INITIAL ASSESSMENT (HPI)
Ryan Giordano is here for eval c/o lips/facial swelling this AM. States she was seen in walkin clinic yesterday and prescribed z-pack for cough. No distress at this time. Airway is patent.

## 2019-04-10 RX ORDER — IBUPROFEN 800 MG/1
TABLET ORAL
Qty: 180 TABLET | Refills: 0 | Status: SHIPPED | OUTPATIENT
Start: 2019-04-10 | End: 2019-05-27

## 2019-04-10 NOTE — TELEPHONE ENCOUNTER
pls adivse on refill request?    Requested Prescriptions   Pending Prescriptions Disp Refills   • IBUPROFEN 800 MG Oral Tab [Pharmacy Med Name: IBUPROFEN 800MG TABLETS] 60 tablet 0     Sig: TAKE 1 TABLET(800 MG) BY MOUTH EVERY 6 HOURS AS NEEDED FOR PAIN

## 2019-05-29 RX ORDER — IBUPROFEN 800 MG/1
TABLET ORAL
Qty: 180 TABLET | Refills: 1 | Status: SHIPPED | OUTPATIENT
Start: 2019-05-29 | End: 2020-04-18

## 2019-07-08 RX ORDER — NORGESTIMATE-ETHINYL ESTRADIOL 7DAYSX3 28
TABLET ORAL
Qty: 1 PACKAGE | Refills: 12 | Status: CANCELLED | OUTPATIENT
Start: 2019-07-08

## 2019-07-08 RX ORDER — IBUPROFEN 800 MG/1
TABLET ORAL
Qty: 180 TABLET | Refills: 1 | OUTPATIENT
Start: 2019-07-08

## 2019-07-08 RX ORDER — IBUPROFEN 800 MG/1
TABLET ORAL
Qty: 180 TABLET | Refills: 1 | Status: CANCELLED | OUTPATIENT
Start: 2019-07-08

## 2019-07-09 NOTE — TELEPHONE ENCOUNTER
Called [facility], they have the scripts with refills on them. Notified patient on MyChart.     Pharmacy   [place] [address] [address], [phone], [phone]   Outpatient Medication Detail

## 2019-09-03 RX ORDER — FLUCONAZOLE 150 MG/1
TABLET ORAL
Qty: 2 TABLET | Refills: 0 | Status: CANCELLED | OUTPATIENT
Start: 2019-09-03

## 2019-09-04 ENCOUNTER — NURSE TRIAGE (OUTPATIENT)
Dept: OTHER | Age: 29
End: 2019-09-04

## 2019-09-04 RX ORDER — FLUCONAZOLE 150 MG/1
150 TABLET ORAL ONCE
Qty: 2 TABLET | Refills: 2 | Status: SHIPPED | OUTPATIENT
Start: 2019-09-04 | End: 2019-09-04

## 2019-09-04 RX ORDER — FLUCONAZOLE 150 MG/1
TABLET ORAL
Qty: 2 TABLET | Refills: 0 | OUTPATIENT
Start: 2019-09-04

## 2019-09-04 NOTE — TELEPHONE ENCOUNTER
Action Requested: Summary for Provider     []  Critical Lab, Recommendations Needed  [x] Need Additional Advice  []   FYI    []   Need Orders  [x] Need Medications Sent to Pharmacy  []  Other     SUMMARY: Vaginal itching and white thick vaginal discharge f

## 2019-09-04 NOTE — TELEPHONE ENCOUNTER
Message noted. May start diflucan as requested. Erx sent to listed pharmacy.  To follow up for appointment if not better; Please notify patient

## 2019-09-04 NOTE — TELEPHONE ENCOUNTER
RN pls call pt and verify the need of rx refill, pls triage or offer ov if needed, thanks. Review pended refill request as it does not fall under a protocol.   Requested Prescriptions     Pending Prescriptions Disp Refills   • fluconazole 150 MG Oral

## 2019-09-11 ENCOUNTER — OFFICE VISIT (OUTPATIENT)
Dept: FAMILY MEDICINE | Age: 29
End: 2019-09-11

## 2019-09-11 VITALS
DIASTOLIC BLOOD PRESSURE: 76 MMHG | OXYGEN SATURATION: 100 % | HEIGHT: 62 IN | WEIGHT: 153.88 LBS | TEMPERATURE: 98.6 F | HEART RATE: 78 BPM | BODY MASS INDEX: 28.32 KG/M2 | SYSTOLIC BLOOD PRESSURE: 123 MMHG

## 2019-09-11 DIAGNOSIS — G43.709 CHRONIC MIGRAINE WITHOUT AURA WITHOUT STATUS MIGRAINOSUS, NOT INTRACTABLE: Primary | ICD-10-CM

## 2019-09-11 PROBLEM — K64.4 EXTERNAL HEMORRHOIDS: Status: ACTIVE | Noted: 2018-06-25

## 2019-09-11 PROCEDURE — 99203 OFFICE O/P NEW LOW 30 MIN: CPT | Performed by: FAMILY MEDICINE

## 2019-09-11 RX ORDER — SUMATRIPTAN 50 MG/1
TABLET, FILM COATED ORAL
Qty: 30 TABLET | Refills: 3 | Status: SHIPPED | OUTPATIENT
Start: 2019-09-11 | End: 2019-12-16 | Stop reason: ALTCHOICE

## 2019-09-11 RX ORDER — NORGESTIMATE AND ETHINYL ESTRADIOL 7DAYSX3 28
KIT ORAL
COMMUNITY
Start: 2019-01-12 | End: 2019-12-16 | Stop reason: ALTCHOICE

## 2019-09-11 SDOH — HEALTH STABILITY: MENTAL HEALTH
STRESS IS WHEN SOMEONE FEELS TENSE, NERVOUS, ANXIOUS, OR CAN'T SLEEP AT NIGHT BECAUSE THEIR MIND IS TROUBLED. HOW STRESSED ARE YOU?: ONLY A LITTLE

## 2019-09-11 SDOH — HEALTH STABILITY: PHYSICAL HEALTH: ON AVERAGE, HOW MANY DAYS PER WEEK DO YOU ENGAGE IN MODERATE TO STRENUOUS EXERCISE (LIKE A BRISK WALK)?: 3 DAYS

## 2019-09-11 SDOH — HEALTH STABILITY: PHYSICAL HEALTH: ON AVERAGE, HOW MANY MINUTES DO YOU ENGAGE IN EXERCISE AT THIS LEVEL?: 20 MIN

## 2019-09-11 ASSESSMENT — ENCOUNTER SYMPTOMS
ENDOCRINE NEGATIVE: 1
COUGH: 0
PSYCHIATRIC NEGATIVE: 1
CHILLS: 0
EYE DISCHARGE: 0
FEVER: 0
SORE THROAT: 0
TROUBLE SWALLOWING: 0
HEMATOLOGIC/LYMPHATIC NEGATIVE: 1
SHORTNESS OF BREATH: 0
HEADACHES: 1
PHOTOPHOBIA: 0
FATIGUE: 0
CHEST TIGHTNESS: 0
WHEEZING: 0
ALLERGIC/IMMUNOLOGIC NEGATIVE: 1
GASTROINTESTINAL NEGATIVE: 1

## 2019-09-11 ASSESSMENT — PATIENT HEALTH QUESTIONNAIRE - PHQ9
SUM OF ALL RESPONSES TO PHQ9 QUESTIONS 1 AND 2: 0
2. FEELING DOWN, DEPRESSED OR HOPELESS: NOT AT ALL
1. LITTLE INTEREST OR PLEASURE IN DOING THINGS: NOT AT ALL
SUM OF ALL RESPONSES TO PHQ9 QUESTIONS 1 AND 2: 0

## 2019-10-03 ENCOUNTER — E-ADVICE (OUTPATIENT)
Dept: FAMILY MEDICINE | Age: 29
End: 2019-10-03

## 2019-10-03 ENCOUNTER — TELEPHONE (OUTPATIENT)
Dept: FAMILY MEDICINE CLINIC | Facility: CLINIC | Age: 29
End: 2019-10-03

## 2019-10-03 NOTE — TELEPHONE ENCOUNTER
[facility]. Pt receiving request from [employer], previously using [facility], also has current script for birth control for 1 yr with [facility], need to confirm if she is requesting new pharmacy.

## 2019-10-22 RX ORDER — NORGESTIMATE-ETHINYL ESTRADIOL 7DAYSX3 28
TABLET ORAL
Qty: 3 PACKAGE | Refills: 1 | Status: SHIPPED | OUTPATIENT
Start: 2019-10-22 | End: 2020-04-18

## 2019-10-22 NOTE — TELEPHONE ENCOUNTER
Please review; protocol failed.     Requested Prescriptions     Pending Prescriptions Disp Refills   • TRINESSA, 28, 0.18/0.215/0.25 MG-35 MCG Oral Tab 1 Package 12     Sig: TK ONE T PO D         Recent Visits  Date Type Provider Dept   01/12/19 Office Visi

## 2019-12-17 ENCOUNTER — OFFICE VISIT (OUTPATIENT)
Dept: FAMILY MEDICINE | Age: 29
End: 2019-12-17

## 2019-12-17 VITALS
BODY MASS INDEX: 28.34 KG/M2 | TEMPERATURE: 98.5 F | OXYGEN SATURATION: 100 % | SYSTOLIC BLOOD PRESSURE: 135 MMHG | WEIGHT: 153.99 LBS | DIASTOLIC BLOOD PRESSURE: 88 MMHG | HEART RATE: 65 BPM | HEIGHT: 62 IN

## 2019-12-17 DIAGNOSIS — Z00.00 ANNUAL PHYSICAL EXAM: Primary | ICD-10-CM

## 2019-12-17 DIAGNOSIS — R03.0 ELEVATED BLOOD PRESSURE READING: ICD-10-CM

## 2019-12-17 DIAGNOSIS — Z30.09 ENCOUNTER FOR OTHER GENERAL COUNSELING OR ADVICE ON CONTRACEPTION: ICD-10-CM

## 2019-12-17 DIAGNOSIS — Z11.3 ROUTINE SCREENING FOR STI (SEXUALLY TRANSMITTED INFECTION): ICD-10-CM

## 2019-12-17 LAB
AMB EXT CHLAMYDIA, NUCLEIC ACID AMP: NEGATIVE
AMB EXT GONOCOCCUS, NUCLEIC ACID AMP: NEGATIVE
AMB EXT HEMATOCRIT: 44.2
AMB EXT HEMOGLOBIN: 14.2
AMB EXT HIV AG AB COMBO: NON REACTIVE
AMB EXT PLATELETS: 237
AMB EXT TREPONEMAL ANTIBODIES: NON REACTIVE

## 2019-12-17 PROCEDURE — 99395 PREV VISIT EST AGE 18-39: CPT | Performed by: FAMILY MEDICINE

## 2019-12-17 PROCEDURE — 80061 LIPID PANEL: CPT | Performed by: FAMILY MEDICINE

## 2019-12-17 PROCEDURE — 87491 CHLMYD TRACH DNA AMP PROBE: CPT | Performed by: FAMILY MEDICINE

## 2019-12-17 PROCEDURE — 82652 VIT D 1 25-DIHYDROXY: CPT | Performed by: FAMILY MEDICINE

## 2019-12-17 PROCEDURE — 86592 SYPHILIS TEST NON-TREP QUAL: CPT | Performed by: FAMILY MEDICINE

## 2019-12-17 PROCEDURE — 80050 GENERAL HEALTH PANEL: CPT | Performed by: FAMILY MEDICINE

## 2019-12-17 PROCEDURE — 83036 HEMOGLOBIN GLYCOSYLATED A1C: CPT | Performed by: FAMILY MEDICINE

## 2019-12-17 PROCEDURE — 87389 HIV-1 AG W/HIV-1&-2 AB AG IA: CPT | Performed by: FAMILY MEDICINE

## 2019-12-17 PROCEDURE — 87591 N.GONORRHOEAE DNA AMP PROB: CPT | Performed by: FAMILY MEDICINE

## 2019-12-17 ASSESSMENT — PATIENT HEALTH QUESTIONNAIRE - PHQ9
2. FEELING DOWN, DEPRESSED OR HOPELESS: NOT AT ALL
1. LITTLE INTEREST OR PLEASURE IN DOING THINGS: NOT AT ALL
SUM OF ALL RESPONSES TO PHQ9 QUESTIONS 1 AND 2: 0
SUM OF ALL RESPONSES TO PHQ9 QUESTIONS 1 AND 2: 0

## 2019-12-17 ASSESSMENT — ENCOUNTER SYMPTOMS
NUMBNESS: 0
COUGH: 0
WOUND: 0
CONSTIPATION: 0
HEMATOLOGIC/LYMPHATIC NEGATIVE: 1
SORE THROAT: 0
ENDOCRINE NEGATIVE: 1
BLOOD IN STOOL: 0
ABDOMINAL PAIN: 0
FATIGUE: 0
DIARRHEA: 0
FEVER: 0
WEAKNESS: 0
HEADACHES: 0
PHOTOPHOBIA: 0
CHILLS: 0
NAUSEA: 0
VOMITING: 0
SLEEP DISTURBANCE: 1
SHORTNESS OF BREATH: 0
WHEEZING: 0
EYE DISCHARGE: 0
DIZZINESS: 0
CHEST TIGHTNESS: 0
TROUBLE SWALLOWING: 0

## 2019-12-18 LAB
ALBUMIN SERPL-MCNC: 4.1 G/DL (ref 3.6–5.1)
ALBUMIN/GLOB SERPL: 1.1 {RATIO} (ref 1–2.4)
ALP SERPL-CCNC: 90 UNITS/L (ref 45–117)
ALT SERPL-CCNC: 15 UNITS/L
ANION GAP SERPL CALC-SCNC: 10 MMOL/L (ref 10–20)
AST SERPL-CCNC: 11 UNITS/L
BASOPHILS # BLD AUTO: 0 K/MCL (ref 0–0.3)
BASOPHILS NFR BLD AUTO: 1 %
BILIRUB SERPL-MCNC: 0.5 MG/DL (ref 0.2–1)
BUN SERPL-MCNC: 12 MG/DL (ref 6–20)
BUN/CREAT SERPL: 15 (ref 7–25)
C TRACH RRNA SPEC QL NAA+PROBE: NEGATIVE
CALCIUM SERPL-MCNC: 8.9 MG/DL (ref 8.4–10.2)
CHLORIDE SERPL-SCNC: 108 MMOL/L (ref 98–107)
CHOLEST SERPL-MCNC: 225 MG/DL
CHOLEST/HDLC SERPL: 2.4 {RATIO}
CO2 SERPL-SCNC: 25 MMOL/L (ref 21–32)
CREAT SERPL-MCNC: 0.79 MG/DL (ref 0.51–0.95)
DIFFERENTIAL METHOD BLD: NORMAL
EOSINOPHIL # BLD AUTO: 0.2 K/MCL (ref 0.1–0.5)
EOSINOPHIL NFR SPEC: 3 %
ERYTHROCYTE [DISTWIDTH] IN BLOOD: 12.3 % (ref 11–15)
FASTING STATUS PATIENT QL REPORTED: ABNORMAL HRS
GLOBULIN SER-MCNC: 3.9 G/DL (ref 2–4)
GLUCOSE SERPL-MCNC: 83 MG/DL (ref 65–99)
HBA1C MFR BLD: 4.7 % (ref 4.5–5.6)
HCT VFR BLD CALC: 44.2 % (ref 36–46.5)
HDLC SERPL-MCNC: 94 MG/DL
HGB BLD-MCNC: 14.2 G/DL (ref 12–15.5)
HIV 1+2 AB+HIV1 P24 AG SERPL QL IA: NONREACTIVE
IMM GRANULOCYTES # BLD AUTO: 0 K/MCL (ref 0–0.2)
IMM GRANULOCYTES NFR BLD: 0 %
LDLC SERPL-MCNC: 123 MG/DL
LENGTH OF FAST TIME PATIENT: ABNORMAL HRS
LYMPHOCYTES # BLD MANUAL: 1.7 K/MCL (ref 1–4.8)
LYMPHOCYTES NFR BLD MANUAL: 35 %
MCH RBC QN AUTO: 29.7 PG (ref 26–34)
MCHC RBC AUTO-ENTMCNC: 32.1 G/DL (ref 32–36.5)
MCV RBC AUTO: 92.5 FL (ref 78–100)
MONOCYTES # BLD MANUAL: 0.4 K/MCL (ref 0.3–0.9)
MONOCYTES NFR BLD MANUAL: 9 %
N GONORRHOEA RRNA SPEC QL NAA+PROBE: NEGATIVE
NEUTROPHILS # BLD: 2.4 K/MCL (ref 1.8–7.7)
NEUTROPHILS NFR BLD AUTO: 52 %
NONHDLC SERPL-MCNC: 131 MG/DL
NRBC BLD MANUAL-RTO: 0 /100 WBC
PLATELET # BLD: 237 K/MCL (ref 140–450)
POTASSIUM SERPL-SCNC: 4.5 MMOL/L (ref 3.4–5.1)
PROT SERPL-MCNC: 8 G/DL (ref 6.4–8.2)
RBC # BLD: 4.78 MIL/MCL (ref 4–5.2)
RPR SER QL: NONREACTIVE
SODIUM SERPL-SCNC: 139 MMOL/L (ref 135–145)
SPECIMEN SOURCE: NORMAL
TRIGL SERPL-MCNC: 38 MG/DL
TSH SERPL-ACNC: 1.54 MCUNITS/ML (ref 0.35–5)
WBC # BLD: 4.7 K/MCL (ref 4.2–11)

## 2019-12-19 LAB — 1,25(OH)2D SERPL-MCNC: 43.9 PG/ML (ref 19.9–79.3)

## 2019-12-28 ENCOUNTER — E-ADVICE (OUTPATIENT)
Dept: FAMILY MEDICINE | Age: 29
End: 2019-12-28

## 2020-01-16 ENCOUNTER — TELEPHONE (OUTPATIENT)
Dept: OBGYN | Age: 30
End: 2020-01-16

## 2020-01-16 ENCOUNTER — LAB ENCOUNTER (OUTPATIENT)
Dept: LAB | Facility: HOSPITAL | Age: 30
End: 2020-01-16
Attending: OBSTETRICS & GYNECOLOGY
Payer: COMMERCIAL

## 2020-01-16 DIAGNOSIS — N93.9 VAGINAL SPOTTING: ICD-10-CM

## 2020-01-16 DIAGNOSIS — R10.9 ABDOMINAL CRAMPING: Primary | ICD-10-CM

## 2020-01-16 LAB
ANTIBODY SCREEN: NEGATIVE
B-HCG SERPL-ACNC: ABNORMAL MIU/ML
RH BLOOD TYPE: POSITIVE

## 2020-01-16 PROCEDURE — 36415 COLL VENOUS BLD VENIPUNCTURE: CPT

## 2020-01-16 PROCEDURE — 84702 CHORIONIC GONADOTROPIN TEST: CPT

## 2020-01-16 PROCEDURE — 86850 RBC ANTIBODY SCREEN: CPT

## 2020-01-16 PROCEDURE — 86901 BLOOD TYPING SEROLOGIC RH(D): CPT

## 2020-01-16 PROCEDURE — 86900 BLOOD TYPING SEROLOGIC ABO: CPT

## 2020-01-20 ENCOUNTER — TELEPHONE (OUTPATIENT)
Dept: OBGYN CLINIC | Facility: CLINIC | Age: 30
End: 2020-01-20

## 2020-01-20 ENCOUNTER — OFFICE VISIT (OUTPATIENT)
Dept: OBGYN | Age: 30
End: 2020-01-20

## 2020-01-20 VITALS
BODY MASS INDEX: 28.04 KG/M2 | WEIGHT: 152.38 LBS | HEIGHT: 62 IN | SYSTOLIC BLOOD PRESSURE: 100 MMHG | DIASTOLIC BLOOD PRESSURE: 70 MMHG

## 2020-01-20 DIAGNOSIS — Z34.91 NORMAL PREGNANCY IN FIRST TRIMESTER: ICD-10-CM

## 2020-01-20 PROCEDURE — 99202 OFFICE O/P NEW SF 15 MIN: CPT | Performed by: LEGAL MEDICINE

## 2020-01-20 PROCEDURE — 76817 TRANSVAGINAL US OBSTETRIC: CPT | Performed by: LEGAL MEDICINE

## 2020-01-20 NOTE — TELEPHONE ENCOUNTER
Pt reports +HPT with lmp 11/26/19 7w6d and monthly menses. Pt states she saw [facility] today and is looking to go elsewhere as pt \"did not care for that group of doctors\".  Pt states she is unsure if she is bale to see our group as pt works for [employer]

## 2020-01-30 ENCOUNTER — FIRST OB VISIT (OUTPATIENT)
Dept: OBGYN | Age: 30
End: 2020-01-30

## 2020-01-30 VITALS
HEIGHT: 62 IN | DIASTOLIC BLOOD PRESSURE: 78 MMHG | BODY MASS INDEX: 27.51 KG/M2 | WEIGHT: 149.5 LBS | SYSTOLIC BLOOD PRESSURE: 110 MMHG

## 2020-01-30 DIAGNOSIS — N89.8 VAGINAL DISCHARGE DURING PREGNANCY IN FIRST TRIMESTER: ICD-10-CM

## 2020-01-30 DIAGNOSIS — Z34.81 ENCOUNTER FOR SUPERVISION OF NORMAL PREGNANCY IN MULTIGRAVIDA IN FIRST TRIMESTER: Primary | ICD-10-CM

## 2020-01-30 DIAGNOSIS — O26.891 VAGINAL DISCHARGE DURING PREGNANCY IN FIRST TRIMESTER: ICD-10-CM

## 2020-01-30 PROCEDURE — 0500F INITIAL PRENATAL CARE VISIT: CPT | Performed by: LEGAL MEDICINE

## 2020-01-30 PROCEDURE — 87801 DETECT AGNT MULT DNA AMPLI: CPT | Performed by: LEGAL MEDICINE

## 2020-01-30 PROCEDURE — 87661 TRICHOMONAS VAGINALIS AMPLIF: CPT | Performed by: LEGAL MEDICINE

## 2020-01-30 PROCEDURE — 87086 URINE CULTURE/COLONY COUNT: CPT | Performed by: LEGAL MEDICINE

## 2020-01-30 PROCEDURE — 87481 CANDIDA DNA AMP PROBE: CPT | Performed by: LEGAL MEDICINE

## 2020-01-31 LAB
BACTERIAL VAGINOSIS VAG-IMP: NOT DETECTED
C ALBICANS DNA VAG QL NAA+PROBE: POSITIVE
C GLABRATA DNA VAG QL NAA+PROBE: NOT DETECTED
C KRUSEI DNA VAG QL NAA+PROBE: NOT DETECTED
SPECIMEN SOURCE: ABNORMAL
T VAGINALIS DNA VAG QL NAA+PROBE: NOT DETECTED

## 2020-02-01 LAB
BACTERIA UR CULT: NORMAL
REPORT STATUS (RPT): NORMAL
SPECIMEN SOURCE: NORMAL

## 2020-02-19 ENCOUNTER — OFFICE VISIT (OUTPATIENT)
Dept: OBGYN | Age: 30
End: 2020-02-19

## 2020-02-19 ENCOUNTER — OFFICE VISIT (OUTPATIENT)
Dept: FAMILY MEDICINE | Age: 30
End: 2020-02-19

## 2020-02-19 ENCOUNTER — TELEPHONE (OUTPATIENT)
Dept: SCHEDULING | Age: 30
End: 2020-02-19

## 2020-02-19 VITALS
RESPIRATION RATE: 18 BRPM | OXYGEN SATURATION: 99 % | TEMPERATURE: 98 F | SYSTOLIC BLOOD PRESSURE: 112 MMHG | WEIGHT: 150.46 LBS | BODY MASS INDEX: 27.69 KG/M2 | HEIGHT: 62 IN | DIASTOLIC BLOOD PRESSURE: 82 MMHG | HEART RATE: 73 BPM

## 2020-02-19 DIAGNOSIS — N76.1 SUBACUTE VAGINITIS: Primary | ICD-10-CM

## 2020-02-19 DIAGNOSIS — Z11.3 SCREENING FOR STDS (SEXUALLY TRANSMITTED DISEASES): ICD-10-CM

## 2020-02-19 DIAGNOSIS — Z34.81 ENCOUNTER FOR SUPERVISION OF NORMAL PREGNANCY IN MULTIGRAVIDA IN FIRST TRIMESTER: ICD-10-CM

## 2020-02-19 DIAGNOSIS — Z3A.11 11 WEEKS GESTATION OF PREGNANCY: Primary | ICD-10-CM

## 2020-02-19 PROCEDURE — X1094 NO CHARGE VISIT: HCPCS | Performed by: FAMILY MEDICINE

## 2020-02-19 PROCEDURE — 0502F SUBSEQUENT PRENATAL CARE: CPT | Performed by: OBSTETRICS & GYNECOLOGY

## 2020-02-19 SDOH — HEALTH STABILITY: PHYSICAL HEALTH: ON AVERAGE, HOW MANY MINUTES DO YOU ENGAGE IN EXERCISE AT THIS LEVEL?: 20 MIN

## 2020-02-19 SDOH — HEALTH STABILITY: PHYSICAL HEALTH: ON AVERAGE, HOW MANY DAYS PER WEEK DO YOU ENGAGE IN MODERATE TO STRENUOUS EXERCISE (LIKE A BRISK WALK)?: 3 DAYS

## 2020-02-20 LAB
BACTERIAL VAGINOSIS VAG-IMP: NOT DETECTED
C ALBICANS DNA VAG QL NAA+PROBE: POSITIVE
C GLABRATA DNA VAG QL NAA+PROBE: NOT DETECTED
C KRUSEI DNA VAG QL NAA+PROBE: NOT DETECTED
SPECIMEN SOURCE: ABNORMAL
SPECIMEN SOURCE: NORMAL
T VAGINALIS DNA VAG QL NAA+PROBE: NOT DETECTED
T VAGINALIS RRNA SPEC QL NAA+PROBE: NEGATIVE

## 2020-02-21 ENCOUNTER — TELEPHONE (OUTPATIENT)
Dept: OBGYN | Age: 30
End: 2020-02-21

## 2020-02-21 DIAGNOSIS — B37.31 CANDIDIASIS OF VULVA AND VAGINA: Primary | ICD-10-CM

## 2020-02-21 RX ORDER — FLUCONAZOLE 150 MG/1
150 TABLET ORAL ONCE
Qty: 1 TABLET | Refills: 1 | Status: SHIPPED | OUTPATIENT
Start: 2020-02-21 | End: 2020-02-21

## 2020-02-24 ENCOUNTER — APPOINTMENT (OUTPATIENT)
Dept: MATERNAL FETAL MEDICINE | Age: 30
End: 2020-02-24

## 2020-02-26 ENCOUNTER — TELEPHONE (OUTPATIENT)
Dept: OBGYN CLINIC | Facility: CLINIC | Age: 30
End: 2020-02-26

## 2020-02-26 NOTE — TELEPHONE ENCOUNTER
Pt confirms +HPT and LMP 11/26. She had 2 appts at [facility] where she works. Waiting for medicaid. [facility]. They will send records. Pt is aware she will see all 6 MDs and first appt is with a nurse. OBN scheduled for tomorrow.  Pt is 13 weeks so I do

## 2020-02-27 ENCOUNTER — APPOINTMENT (OUTPATIENT)
Dept: LAB | Facility: HOSPITAL | Age: 30
End: 2020-02-27
Attending: OBSTETRICS & GYNECOLOGY
Payer: COMMERCIAL

## 2020-02-27 ENCOUNTER — NURSE ONLY (OUTPATIENT)
Dept: OBGYN CLINIC | Facility: CLINIC | Age: 30
End: 2020-02-27
Payer: COMMERCIAL

## 2020-02-27 VITALS — BODY MASS INDEX: 27.53 KG/M2 | WEIGHT: 149.63 LBS | HEIGHT: 62 IN

## 2020-02-27 DIAGNOSIS — Z34.82 ENCOUNTER FOR SUPERVISION OF OTHER NORMAL PREGNANCY IN SECOND TRIMESTER: Primary | ICD-10-CM

## 2020-02-27 DIAGNOSIS — Z34.82 ENCOUNTER FOR SUPERVISION OF OTHER NORMAL PREGNANCY IN SECOND TRIMESTER: ICD-10-CM

## 2020-02-27 LAB
HBV SURFACE AG SER-ACNC: <0.1 [IU]/L
HBV SURFACE AG SERPL QL IA: NONREACTIVE
RUBV IGG SER QL: POSITIVE
RUBV IGG SER-ACNC: 27.9 IU/ML (ref 10–?)

## 2020-02-27 PROCEDURE — 36415 COLL VENOUS BLD VENIPUNCTURE: CPT

## 2020-02-27 PROCEDURE — 86762 RUBELLA ANTIBODY: CPT

## 2020-02-27 PROCEDURE — 85660 RBC SICKLE CELL TEST: CPT

## 2020-02-27 PROCEDURE — 87340 HEPATITIS B SURFACE AG IA: CPT

## 2020-02-27 PROCEDURE — 99211 OFF/OP EST MAY X REQ PHY/QHP: CPT | Performed by: OBSTETRICS & GYNECOLOGY

## 2020-02-27 RX ORDER — CHOLECALCIFEROL (VITAMIN D3) 25 MCG
1 TABLET,CHEWABLE ORAL DAILY
COMMUNITY

## 2020-02-27 NOTE — PROGRESS NOTES
Pt seen for OBN appt today as a transfer of care at [facility] [address] with no complaints. Normal PN labs manually entered into epic and reviewed with Mary Emerson RN. Pt missing rubella, sickle cell, and hep B and orders have been placed.    Pt advised all labs must be co Recent Travel (or planned travel) to [facility] for self and or partner No    Pets No        GENETICS SCREENING    Genetic Screening    Genetic Screening/Teratology Counseling- Includes patient, baby's father, or anyone in either family with:  Patient's a

## 2020-03-01 LAB — HGB S BLD QL SOLY: NEGATIVE

## 2020-03-03 ENCOUNTER — INITIAL PRENATAL (OUTPATIENT)
Dept: OBGYN CLINIC | Facility: CLINIC | Age: 30
End: 2020-03-03
Payer: COMMERCIAL

## 2020-03-03 VITALS
WEIGHT: 147.38 LBS | DIASTOLIC BLOOD PRESSURE: 81 MMHG | SYSTOLIC BLOOD PRESSURE: 117 MMHG | BODY MASS INDEX: 27 KG/M2 | HEART RATE: 88 BPM

## 2020-03-03 DIAGNOSIS — Z34.82 ENCOUNTER FOR SUPERVISION OF OTHER NORMAL PREGNANCY IN SECOND TRIMESTER: Primary | ICD-10-CM

## 2020-03-03 LAB
APPEARANCE: CLEAR
MULTISTIX LOT#: NORMAL NUMERIC
PH, URINE: 6.5 (ref 4.5–8)
SPECIFIC GRAVITY: 1.02 (ref 1–1.03)
URINE-COLOR: YELLOW
UROBILINOGEN,SEMI-QN: 0.2 MG/DL (ref 0–1.9)

## 2020-03-03 PROCEDURE — 81002 URINALYSIS NONAUTO W/O SCOPE: CPT | Performed by: OBSTETRICS & GYNECOLOGY

## 2020-03-03 RX ORDER — .BETA.-CAROTENE, ASCORBIC ACID, CHOLECALCIFEROL, .ALPHA.-TOCOPHEROL ACETATE, DL-, THIAMINE MONONITRATE, RIBOFLAVIN, NIACINAMIDE, PYRIDOXINE HYDROCHLORIDE, FOLIC ACID, CYANOCOBALAMIN, CALCIUM PANTOTHENATE, CALCIUM CARBONATE, FERROUS FUMARATE, ZINC OXIDE, AND DOCUSATE SODIUM 1000; 100; 400; 30; 3; 3; 15; 20; 1; 12; 7; 200; 29; 20; 25 [IU]/1; MG/1; [IU]/1; [IU]/1; MG/1; MG/1; MG/1; MG/1; MG/1; UG/1; MG/1; MG/1; MG/1; MG/1; MG/1
1 TABLET, COATED ORAL DAILY
Qty: 90 TABLET | Refills: 3 | Status: SHIPPED | OUTPATIENT
Start: 2020-03-03 | End: 2020-04-02

## 2020-03-03 NOTE — PROGRESS NOTES
Neg Pap 1/18. Vag cx done due to ?irritation still -- had used monistat 7 -- less clumpy d/c but still feels irritated. Wishes for PNV script. Had flu shot in Nov. RTC 4 wks.

## 2020-03-05 LAB
GENITAL VAGINOSIS SCREEN: NEGATIVE
TRICHOMONAS SCREEN: NEGATIVE

## 2020-04-01 ENCOUNTER — TELEPHONE (OUTPATIENT)
Dept: OBGYN CLINIC | Facility: CLINIC | Age: 30
End: 2020-04-01

## 2020-04-01 DIAGNOSIS — Z3A.17 17 WEEKS GESTATION OF PREGNANCY: Primary | ICD-10-CM

## 2020-04-01 PROCEDURE — G2012 BRIEF CHECK IN BY MD/QHP: HCPCS | Performed by: OBSTETRICS & GYNECOLOGY

## 2020-04-01 RX ORDER — METRONIDAZOLE 500 MG/1
500 TABLET ORAL 2 TIMES DAILY
Qty: 14 TABLET | Refills: 0 | Status: SHIPPED | OUTPATIENT
Start: 2020-04-01 | End: 2020-04-18

## 2020-04-01 NOTE — TELEPHONE ENCOUNTER
Needs letter of confirmation of pregnancy for work. Please send to patient. Also, I did phone visit today and ordered anatomy scan for 20 weeks . She needs phone number on how to schedule. Thanks!

## 2020-04-01 NOTE — TELEPHONE ENCOUNTER
Pt informed that letter was sent to pt via my chart. Pt also provided with # to CS to set up 20 week US.

## 2020-04-02 NOTE — TELEPHONE ENCOUNTER
Received a call from [facility] in [employer] Verification inquiring on this authorization. Submitted case online via [employer]. Case has been approved. Auth # [id number] is valid until 12/28/2020. Notified Insurance Verification of approval. Referral updated.

## 2020-04-06 ENCOUNTER — TELEPHONE (OUTPATIENT)
Dept: OBGYN CLINIC | Facility: CLINIC | Age: 30
End: 2020-04-06

## 2020-04-06 NOTE — TELEPHONE ENCOUNTER
STATES SHE HAS 2 PILLS LEFT OF FLAGYL BUT BEGAN HAVING THICK WHITE VAG DISCHARGE WITH LOTS OF ITCHING AND EXTERNAL BURNING FOR A COUPLE DAYS NOW.  WOULD LIKE DIFLUCAN AGAIN.

## 2020-04-06 NOTE — TELEPHONE ENCOUNTER
pt. states that she may possibly be developing a yeast infection, and wants to know if a Rx can be called in to the pharmacy.

## 2020-04-09 ENCOUNTER — TELEPHONE (OUTPATIENT)
Dept: FAMILY MEDICINE CLINIC | Facility: CLINIC | Age: 30
End: 2020-04-09

## 2020-04-14 ENCOUNTER — TELEPHONE (OUTPATIENT)
Dept: OBGYN CLINIC | Facility: CLINIC | Age: 30
End: 2020-04-14

## 2020-04-14 NOTE — TELEPHONE ENCOUNTER
RECEIVED TEAM MEMBER ATTESTATION OF HEALTH CONDITION FORM.   THIS FORM ALLOWS TEAM MEMBERS WHO ARE PREGNANT, IMMUNOCOMPROMISED AND/OR HAVE A HISTORY OF AN UNDERLYING MEDICAL CONDITION TO REQUEST TO BE EXCLUDED FROM PROVIDING CARE TO RULE-OUT/RULE-IN COVID-1

## 2020-04-14 NOTE — TELEPHONE ENCOUNTER
Pt states she can not talk now. Asked pt to call back when she is available to talk. Pt verbalized understanding.

## 2020-04-14 NOTE — TELEPHONE ENCOUNTER
STATES SHE USED THE 7 DAY MONISTAT, FINISHED SAT BUT WAS STILL A LITTLE SORE SUN AND MON BUT RESOLVED COMPLETELY SINCE THEN. PT WONDERED IS THIS COULD HAVE BEEN TRANSMITTED FROM A PARTNER. REASSURED HER YEAST AND BV ARE NOT SEXUALLY TRANSMITTED.   SHE IS

## 2020-04-16 ENCOUNTER — TELEPHONE (OUTPATIENT)
Dept: OBGYN CLINIC | Facility: CLINIC | Age: 30
End: 2020-04-16

## 2020-04-16 RX ORDER — METRONIDAZOLE 500 MG/1
TABLET ORAL
Qty: 14 TABLET | Refills: 0 | OUTPATIENT
Start: 2020-04-16

## 2020-04-17 RX ORDER — METRONIDAZOLE 500 MG/1
500 TABLET ORAL 2 TIMES DAILY
Qty: 14 TABLET | Refills: 0 | OUTPATIENT
Start: 2020-04-17

## 2020-04-17 NOTE — TELEPHONE ENCOUNTER
ON CALL-- spoke with pt.  20 wk. Having vaginal discharge with odor. Used Flagyl then Monistat 7. Still with vaginal discharge and odor. Does not want to wait until end of month to have this evaluated with PN visit.   Pt will call tomorrow am for appt

## 2020-04-17 NOTE — TELEPHONE ENCOUNTER
Noted. Will await pt call for appt.      When pt calls please assist pt with scheduling appt for today with RAMOS or CAP or tomorrow with JEANA.

## 2020-04-18 ENCOUNTER — ROUTINE PRENATAL (OUTPATIENT)
Dept: OBGYN CLINIC | Facility: CLINIC | Age: 30
End: 2020-04-18
Payer: MEDICAID

## 2020-04-18 DIAGNOSIS — O26.892 VAGINAL DISCHARGE DURING PREGNANCY IN SECOND TRIMESTER: Primary | ICD-10-CM

## 2020-04-18 DIAGNOSIS — N89.8 VAGINAL DISCHARGE DURING PREGNANCY IN SECOND TRIMESTER: Primary | ICD-10-CM

## 2020-04-18 PROCEDURE — 99213 OFFICE O/P EST LOW 20 MIN: CPT | Performed by: OBSTETRICS & GYNECOLOGY

## 2020-04-18 NOTE — PROGRESS NOTES
Problem visit: C/O recurrent malodorous DC previously treated with PO Flagyl and monistat. Exam shows white frothy DC and no lesions. Vaginal culture and STD screen done. Cervix TH/FIRM / Closed. Await cultures.

## 2020-04-19 ENCOUNTER — TELEPHONE (OUTPATIENT)
Dept: OBGYN CLINIC | Facility: CLINIC | Age: 30
End: 2020-04-19

## 2020-04-19 RX ORDER — METRONIDAZOLE 500 MG/1
500 TABLET ORAL 2 TIMES DAILY
Qty: 14 TABLET | Refills: 0 | Status: ON HOLD | OUTPATIENT
Start: 2020-04-19 | End: 2020-08-28

## 2020-04-19 NOTE — TELEPHONE ENCOUNTER
Vaginal culture with + BV and negative Trich. STD screen and Yeast pending. We'll send PO Flagyl x 7 d. Patient notified.

## 2020-04-20 ENCOUNTER — APPOINTMENT (OUTPATIENT)
Dept: MATERNAL FETAL MEDICINE | Age: 30
End: 2020-04-20

## 2020-04-27 ENCOUNTER — TELEPHONE (OUTPATIENT)
Dept: OBGYN CLINIC | Facility: CLINIC | Age: 30
End: 2020-04-27

## 2020-04-27 NOTE — TELEPHONE ENCOUNTER
WOULD LIKE APPT BEFORE 10AM IF POSSIBLE BUT DOES NOT HAVE TO BE.  PT AWARE WE WILL LIKELY BE CALLING TOMORROW ONCE THE SCHEDULE IS PUT IN FOR THUR. MESSAGE TO Phebe Brittle.

## 2020-04-29 ENCOUNTER — HOSPITAL ENCOUNTER (OUTPATIENT)
Dept: ULTRASOUND IMAGING | Facility: HOSPITAL | Age: 30
Discharge: HOME | End: 2020-04-29
Attending: OBSTETRICS & GYNECOLOGY
Payer: MEDICAID

## 2020-04-29 DIAGNOSIS — Z3A.17 17 WEEKS GESTATION OF PREGNANCY: ICD-10-CM

## 2020-04-29 PROCEDURE — 76805 OB US >/= 14 WKS SNGL FETUS: CPT | Performed by: OBSTETRICS & GYNECOLOGY

## 2020-04-30 ENCOUNTER — VIRTUAL PHONE E/M (OUTPATIENT)
Dept: OBGYN CLINIC | Facility: CLINIC | Age: 30
End: 2020-04-30
Payer: MEDICAID

## 2020-04-30 ENCOUNTER — TELEPHONE (OUTPATIENT)
Dept: OBGYN CLINIC | Facility: CLINIC | Age: 30
End: 2020-04-30

## 2020-04-30 VITALS — WEIGHT: 150 LBS | BODY MASS INDEX: 27 KG/M2

## 2020-04-30 DIAGNOSIS — Z34.02 ENCOUNTER FOR SUPERVISION OF NORMAL FIRST PREGNANCY IN SECOND TRIMESTER: Primary | ICD-10-CM

## 2020-04-30 DIAGNOSIS — Z34.92 ENCOUNTER FOR SUPERVISION OF NORMAL PREGNANCY IN SECOND TRIMESTER, UNSPECIFIED GRAVIDITY: Primary | ICD-10-CM

## 2020-04-30 PROCEDURE — G2012 BRIEF CHECK IN BY MD/QHP: HCPCS | Performed by: OBSTETRICS & GYNECOLOGY

## 2020-04-30 NOTE — TELEPHONE ENCOUNTER
Roya Vang states she spoke with mgr. Per Mgr everything was done and the views were suboptimal due to the way baby was laying. Per Radiology Mgr JEANA or RAMOS needs to order limited OB U/S to be done in 2 wks.    Message to JEANA.

## 2020-04-30 NOTE — TELEPHONE ENCOUNTER
Spoke to Yinka London in Radiology. Keyona Lora states she will contact [email or web address] to see if pt is in que to be called today to inform pt of additional views.

## 2020-04-30 NOTE — TELEPHONE ENCOUNTER
----- Message from Amrita Dickinson DO sent at 4/29/2020  7:53 PM CDT -----  Needs additional views for spine and cord insertion

## 2020-04-30 NOTE — TELEPHONE ENCOUNTER
Please call radiology. They are supposed to recall all patients who did not have a complete anatomic survey. This does not come from our office. I have phone visit with patient now and will let her know to expect a call.

## 2020-04-30 NOTE — TELEPHONE ENCOUNTER
That is not what we have been told in previous meetings with radiology. Please order US but I expect patient to not be billed for this.

## 2020-05-01 ENCOUNTER — TELEPHONE (OUTPATIENT)
Dept: OBGYN CLINIC | Facility: CLINIC | Age: 30
End: 2020-05-01

## 2020-05-01 RX ORDER — FLUCONAZOLE 150 MG/1
150 TABLET ORAL ONCE
Qty: 1 TABLET | Refills: 0 | Status: SHIPPED | OUTPATIENT
Start: 2020-05-01 | End: 2020-05-01

## 2020-05-01 RX ORDER — FLUCONAZOLE 150 MG/1
150 TABLET ORAL ONCE
Qty: 1 TABLET | Refills: 0 | Status: CANCELLED | OUTPATIENT
Start: 2020-05-01 | End: 2020-05-01

## 2020-05-01 NOTE — TELEPHONE ENCOUNTER
Per pt has been having reoccurring yeast infections and wondering if diflucan can be called in.  Please advise pt is 22 weeks along

## 2020-05-01 NOTE — TELEPHONE ENCOUNTER
Informed pt of CAP recs below  High adverse reaction due to pregnancy. Message to CAP to please place order for Diflucan. Rx pended.

## 2020-05-01 NOTE — TELEPHONE ENCOUNTER
22w0d Pt requesting Rx for Diflucan. Pt reports vaginal discharge and vaginal irritation x1 day. Pt states she has been having recurrent yeast infections and JEANA informed pt we can send Rx for Diflucan. Pt states she completed Flagyl on 4/26/2020 and then u

## 2020-05-04 ENCOUNTER — TELEPHONE (OUTPATIENT)
Dept: OBGYN CLINIC | Facility: CLINIC | Age: 30
End: 2020-05-04

## 2020-05-04 ENCOUNTER — HOSPITAL ENCOUNTER (OUTPATIENT)
Dept: ULTRASOUND IMAGING | Age: 30
Discharge: HOME | End: 2020-05-04
Attending: OBSTETRICS & GYNECOLOGY
Payer: MEDICAID

## 2020-05-04 DIAGNOSIS — Z34.92 ENCOUNTER FOR SUPERVISION OF NORMAL PREGNANCY IN SECOND TRIMESTER, UNSPECIFIED GRAVIDITY: ICD-10-CM

## 2020-05-04 PROCEDURE — 76815 OB US LIMITED FETUS(S): CPT | Performed by: OBSTETRICS & GYNECOLOGY

## 2020-05-08 ENCOUNTER — TELEPHONE (OUTPATIENT)
Dept: OBGYN CLINIC | Facility: CLINIC | Age: 30
End: 2020-05-08

## 2020-05-08 NOTE — TELEPHONE ENCOUNTER
Brittany Barba SIGNED FORM AND HAS BEEN COMPLETED. DOES SHE WANT FORM FAXED BACK TO [facility], FAXED TO HER, ? FORM IN FRONT OFFICE.

## 2020-05-08 NOTE — TELEPHONE ENCOUNTER
Spoke with pt. As below. Needs to come in next week anyway for her routine PN visit. Will also get BV screen at that visit.

## 2020-05-08 NOTE — TELEPHONE ENCOUNTER
23W0D. Prenatal pt states that she has had vaginal discomfort that has been going on. She states that she keeps getting BV and Yeast.  She states that she transferred to us in March.   She states that she has used Monistat six times since Jan.  Monistat 7

## 2020-05-09 RX ORDER — FLUCONAZOLE 150 MG/1
150 TABLET ORAL ONCE
Qty: 2 TABLET | Refills: 0 | Status: SHIPPED | OUTPATIENT
Start: 2020-05-09 | End: 2020-05-09

## 2020-05-09 NOTE — TELEPHONE ENCOUNTER
Patient states that she does not want to wait until Wednesday to address her problem- she states she feels like she has yeast infection and is very uncomfortable.

## 2020-05-09 NOTE — TELEPHONE ENCOUNTER
Chart reviewed and culture 04-18 was + BV and negative yeast. These current symptoms happened since BV meds. Therefore, OK for Diflucan but I want culture at next visit.

## 2020-05-09 NOTE — TELEPHONE ENCOUNTER
Pt is requesting Diflucan. She confirms below and states she cannot wait until Wednesday. She is \"so uncomfortable\". White thick vaginal discharge and itching. Will review with JEANA in office and call her back.

## 2020-05-13 ENCOUNTER — ROUTINE PRENATAL (OUTPATIENT)
Dept: OBGYN CLINIC | Facility: CLINIC | Age: 30
End: 2020-05-13
Payer: MEDICAID

## 2020-05-13 VITALS
HEART RATE: 85 BPM | SYSTOLIC BLOOD PRESSURE: 116 MMHG | WEIGHT: 152 LBS | BODY MASS INDEX: 28 KG/M2 | DIASTOLIC BLOOD PRESSURE: 75 MMHG

## 2020-05-13 DIAGNOSIS — Z34.92 ENCOUNTER FOR SUPERVISION OF NORMAL PREGNANCY IN SECOND TRIMESTER, UNSPECIFIED GRAVIDITY: Primary | ICD-10-CM

## 2020-05-13 PROCEDURE — 0502F SUBSEQUENT PRENATAL CARE: CPT | Performed by: OBSTETRICS & GYNECOLOGY

## 2020-05-13 PROCEDURE — 81002 URINALYSIS NONAUTO W/O SCOPE: CPT | Performed by: OBSTETRICS & GYNECOLOGY

## 2020-05-13 NOTE — PROGRESS NOTES
Very frustrated by recurrent BV / yeast / vaginal odor during this pregnancy. Got Diflucan 4 days ago & clumpy d/c resolved. Still w/ odor when urinates. Pt to stop PNV for 1-2 weeks to see if odor due to vitamens.  VAg cx done -- do not use Rx until PNV t

## 2020-05-15 ENCOUNTER — TELEPHONE (OUTPATIENT)
Dept: OBGYN CLINIC | Facility: CLINIC | Age: 30
End: 2020-05-15

## 2020-05-15 RX ORDER — METRONIDAZOLE 7.5 MG/G
1 GEL VAGINAL NIGHTLY
Qty: 1 TUBE | Refills: 0 | Status: SHIPPED | OUTPATIENT
Start: 2020-05-15 | End: 2020-05-20

## 2020-05-15 NOTE — TELEPHONE ENCOUNTER
PT NOTIFIED VAG CULTURE POSITIVE FOR BV. STATES SHE REALLY NEEDS TO HAVE 2 DIFLUCAN TO TAKE AFTER SHE FINISHES FLAGYL BECAUSE SHE ALWAYS, ALWAYS GET A YEAST INFECTION ONCE SHE COMPLETES FLAGYL.   HAS BEEN ALTERNATING WEEKLY WITH BV AND YEAST FOR A COUPLE M

## 2020-05-19 ENCOUNTER — TELEPHONE (OUTPATIENT)
Dept: OBGYN CLINIC | Facility: CLINIC | Age: 30
End: 2020-05-19

## 2020-05-19 RX ORDER — FLUCONAZOLE 150 MG/1
150 TABLET ORAL SEE ADMIN INSTRUCTIONS
Qty: 2 TABLET | Refills: 0 | Status: SHIPPED | OUTPATIENT
Start: 2020-05-19 | End: 2020-06-26

## 2020-05-19 NOTE — TELEPHONE ENCOUNTER
Please advise her that the thick white clumpy d/c she sees could just be the gel that is coming back out- it does turn white inside of the vagina.   If she thinks there is yeast also you can send in an rx for diflucan or she can make an appt to be seen for

## 2020-05-19 NOTE — TELEPHONE ENCOUNTER
Informed pt of CAP recs below. Pt does not want to come in for cultures as she was just in last week. Pt requesting 2 tabs of Diflucan. Informed pt Rx will be sent to pharmacy.  Pt asking if we know the exact PNV that was prescribed with pregnancy in 2014 a

## 2020-05-19 NOTE — TELEPHONE ENCOUNTER
24w4d Pt is very frustrated with the multiple vaginal infections she is having. Pt is on Metrogel and has 1 application left. Pt reports \"thick white clumpy discharge and everything is extra dry down there, it is so irritated\". Pt states she is having vag

## 2020-05-29 ENCOUNTER — TELEPHONE (OUTPATIENT)
Dept: OBGYN CLINIC | Facility: CLINIC | Age: 30
End: 2020-05-29

## 2020-05-29 RX ORDER — METRONIDAZOLE 500 MG/1
500 TABLET ORAL 2 TIMES DAILY
Qty: 14 TABLET | Refills: 0 | Status: SHIPPED | OUTPATIENT
Start: 2020-05-29 | End: 2020-06-05

## 2020-05-29 NOTE — TELEPHONE ENCOUNTER
Notified of recs below. Pt agrees to try Flagyl but also asking for Diflucan 2 tabs because she always gets a yeast infection if she takes antibiotics. Informed pt will check and we will call her back. Pt does not want appt canceled yet.

## 2020-05-29 NOTE — TELEPHONE ENCOUNTER
As she is pregnant, I do not just want to give her medication for yeast. I have a positive culture for BV and classic symptoms. If she feels she has yeast symptoms, I would recommend we culture it out.

## 2020-05-29 NOTE — TELEPHONE ENCOUNTER
Ok for flagyl 500mg BID for 7 days to see if oral medication works better for her. I do not need to see her unless she would like to be seen.

## 2020-05-29 NOTE — TELEPHONE ENCOUNTER
Notified pt of message below. Pt agrees to taking Flagyl and is okay with canceling appt tomorrow. Advised pt to call us back if she continues to have symptoms after tx.

## 2020-05-29 NOTE — TELEPHONE ENCOUNTER
Pt is 26w0d, reports she used metrogel for 5 nights and completed tx about one week ago and is having symptoms again. Reports she has been having \"runny milky yellow discharge\" for about one week. States fishy odor started about 3 days ago.  Reports some

## 2020-06-04 ENCOUNTER — TELEPHONE (OUTPATIENT)
Dept: OBGYN CLINIC | Facility: CLINIC | Age: 30
End: 2020-06-04

## 2020-06-04 NOTE — TELEPHONE ENCOUNTER
26w6d. Pt states that she thinks she has a yeast infection. She had BV and pt states she took Metrogel and then Flagyl. Pt's last Flagyl pill will be tomorrow. Pt states that she as vag itching, burning, labia are swollen and thick yellow greenish discharge. Pt states at her vag opening it is white. Pt states that Monistat does not work for her. Pt states she has been getting BV and yeast infection for several months. Lab result from 5-13-20 BV +, Candida neg. Pt states when she always finishes or about to finish Flagyl she gets a yeast infection. Sent to MD on Call, [facility], for recs.

## 2020-06-04 NOTE — TELEPHONE ENCOUNTER
Informed pt [facility] would rec vaginal culture. Informed pt that we need to limit medications and only Rx if positive. Pt made an appt for tomorrow.

## 2020-06-05 ENCOUNTER — ROUTINE PRENATAL (OUTPATIENT)
Dept: OBGYN CLINIC | Facility: CLINIC | Age: 30
End: 2020-06-05
Payer: MEDICAID

## 2020-06-05 VITALS
BODY MASS INDEX: 28 KG/M2 | HEART RATE: 92 BPM | DIASTOLIC BLOOD PRESSURE: 79 MMHG | SYSTOLIC BLOOD PRESSURE: 121 MMHG | WEIGHT: 152.81 LBS

## 2020-06-05 DIAGNOSIS — Z34.82 ENCOUNTER FOR SUPERVISION OF OTHER NORMAL PREGNANCY IN SECOND TRIMESTER: Primary | ICD-10-CM

## 2020-06-05 PROCEDURE — 99213 OFFICE O/P EST LOW 20 MIN: CPT | Performed by: OBSTETRICS & GYNECOLOGY

## 2020-06-05 PROCEDURE — 81002 URINALYSIS NONAUTO W/O SCOPE: CPT | Performed by: OBSTETRICS & GYNECOLOGY

## 2020-06-07 ENCOUNTER — LAB ENCOUNTER (OUTPATIENT)
Dept: LAB | Facility: HOSPITAL | Age: 30
End: 2020-06-07
Attending: OBSTETRICS & GYNECOLOGY
Payer: MEDICAID

## 2020-06-07 DIAGNOSIS — Z34.02 ENCOUNTER FOR SUPERVISION OF NORMAL FIRST PREGNANCY IN SECOND TRIMESTER: ICD-10-CM

## 2020-06-07 PROCEDURE — 85025 COMPLETE CBC W/AUTO DIFF WBC: CPT

## 2020-06-07 PROCEDURE — 36415 COLL VENOUS BLD VENIPUNCTURE: CPT

## 2020-06-07 PROCEDURE — 82950 GLUCOSE TEST: CPT

## 2020-06-07 NOTE — PROGRESS NOTES
Vag cx done. (+) curdy discharge c/w yeast. Monistat does not work for her. If candida, plan for diflucan 150 mg x 2 doses 3 days apart.

## 2020-06-09 ENCOUNTER — TELEPHONE (OUTPATIENT)
Dept: OBGYN CLINIC | Facility: CLINIC | Age: 30
End: 2020-06-09

## 2020-06-09 RX ORDER — FLUCONAZOLE 150 MG/1
TABLET ORAL
Qty: 2 TABLET | Refills: 0 | Status: SHIPPED | OUTPATIENT
Start: 2020-06-09 | End: 2020-06-26

## 2020-06-09 NOTE — TELEPHONE ENCOUNTER
Informed pt that [facility] stated to give Diflucan 150mg x 1 and repeat in 3 days. (Do not treat BV)    Informed pt of [facility]'s recs. Rx sent for Diflucan and to repeat in 3 days. Pt wanted the rx sent as [facility] rec.   She wanted a question sent to [address] [place] [address] to know that s

## 2020-06-12 ENCOUNTER — ROUTINE PRENATAL (OUTPATIENT)
Dept: OBGYN CLINIC | Facility: CLINIC | Age: 30
End: 2020-06-12
Payer: MEDICAID

## 2020-06-12 VITALS
WEIGHT: 154 LBS | SYSTOLIC BLOOD PRESSURE: 112 MMHG | BODY MASS INDEX: 28 KG/M2 | DIASTOLIC BLOOD PRESSURE: 75 MMHG | HEART RATE: 90 BPM

## 2020-06-12 DIAGNOSIS — Z34.93 ENCOUNTER FOR SUPERVISION OF NORMAL PREGNANCY IN THIRD TRIMESTER, UNSPECIFIED GRAVIDITY: Primary | ICD-10-CM

## 2020-06-12 PROCEDURE — 0502F SUBSEQUENT PRENATAL CARE: CPT | Performed by: OBSTETRICS & GYNECOLOGY

## 2020-06-12 PROCEDURE — 81002 URINALYSIS NONAUTO W/O SCOPE: CPT | Performed by: OBSTETRICS & GYNECOLOGY

## 2020-06-12 NOTE — PROGRESS NOTES
Took diflucan x 2 doses. Last dose today. Symptoms mildly improving. Labia feel swollen and also using external monistat. External genitalia exam unremarkable. Pt wants sterilization. Discussed if CS would she want at that time.   She states she wants

## 2020-06-15 ENCOUNTER — TELEPHONE (OUTPATIENT)
Dept: OBGYN CLINIC | Facility: CLINIC | Age: 30
End: 2020-06-15

## 2020-06-19 ENCOUNTER — TELEPHONE (OUTPATIENT)
Dept: OBGYN CLINIC | Facility: CLINIC | Age: 30
End: 2020-06-19

## 2020-06-19 RX ORDER — METRONIDAZOLE 500 MG/1
500 TABLET ORAL 2 TIMES DAILY
Qty: 14 TABLET | Refills: 0 | OUTPATIENT
Start: 2020-06-19

## 2020-06-19 RX ORDER — METRONIDAZOLE 7.5 MG/G
GEL VAGINAL
Qty: 1 TUBE | Refills: 0 | Status: ON HOLD | OUTPATIENT
Start: 2020-06-19 | End: 2020-08-28

## 2020-06-19 NOTE — TELEPHONE ENCOUNTER
Informed pt that [facility] stated she can and she can ask CAP at her visit. \"Pt states she not getting a straight answer\". Pt states that she would like to talk to the MD on Call, today.

## 2020-06-19 NOTE — TELEPHONE ENCOUNTER
Sent to Peds in error- sent to [facility] RN Consent was obtained from the patient. The risks and benefits to therapy were discussed in detail. Specifically, the risks of infection, scarring, bleeding, prolonged wound healing, incomplete removal, allergy to anesthesia, nerve injury and recurrence were addressed. Prior to the procedure, the treatment site was clearly identified and confirmed by the patient. All components of Universal Protocol/PAUSE Rule completed.

## 2020-06-19 NOTE — TELEPHONE ENCOUNTER
C/O HAS YELLOW VG DISCHARGE WITH STRONG ODOR AGAIN. HER 10YEAR OLD TOLD HER HE COULD SMELL HER. PT IS GETTING REALLY STRESSED AND ANXIOUS BECAUSE SYMPTOMS RECUR EVERY 2 WEEKS. HAS BEEN TREATED WITH FLAGYL 4 TIMES AND DIFLUCAN 4 TIMES SINCE April.   MESSAGE TO RAMOS (ON CALL)

## 2020-06-19 NOTE — TELEPHONE ENCOUNTER
Spoke with pt. Pt frustrated with frequent recurrent BV. Will take Metrogel. Has PN appt on Monday.   Will discuss at MD discussion for probable extended treatment

## 2020-06-19 NOTE — TELEPHONE ENCOUNTER
PT NOTIFIED OF RECS AND SHE ABSOLUTELY REFUSES TO COME IN AGAIN FOR ANOTHER CULTURE TO GET THE SAME RESULTS AGAIN AND HAVE TO WAIT THROUGH THE WEEKEND FOR THE CULTURE RESULT ONLY TO BE TREATED THEN INSTEAD OF NOW. SHE HAS HAD 4 VAGINAL CULTURES IN 3 MONTHS AND REFUSES TO DO ANOTHER AT THIS TIME.

## 2020-06-19 NOTE — TELEPHONE ENCOUNTER
Informed pt that [facility] stated the prenatal pt can use Metrogel. Sent rx for metrogel 0.75% to the pharmacy. Pt states that she is very frustrated she has been dealing with odors and yeast infections during her pregnancy. Pt states that she is so frustrated. Pt wants to know if she should do any STD testing? Pt is seeing CAP on Monday, 6/22.

## 2020-06-22 ENCOUNTER — ROUTINE PRENATAL (OUTPATIENT)
Dept: OBGYN CLINIC | Facility: CLINIC | Age: 30
End: 2020-06-22
Payer: MEDICAID

## 2020-06-22 VITALS
SYSTOLIC BLOOD PRESSURE: 121 MMHG | DIASTOLIC BLOOD PRESSURE: 83 MMHG | BODY MASS INDEX: 29 KG/M2 | WEIGHT: 156.5 LBS | HEART RATE: 92 BPM

## 2020-06-22 DIAGNOSIS — N89.8 VAGINAL DISCHARGE: ICD-10-CM

## 2020-06-22 DIAGNOSIS — Z34.83 ENCOUNTER FOR SUPERVISION OF OTHER NORMAL PREGNANCY IN THIRD TRIMESTER: Primary | ICD-10-CM

## 2020-06-22 PROCEDURE — 81002 URINALYSIS NONAUTO W/O SCOPE: CPT | Performed by: OBSTETRICS & GYNECOLOGY

## 2020-06-22 PROCEDURE — 0502F SUBSEQUENT PRENATAL CARE: CPT | Performed by: OBSTETRICS & GYNECOLOGY

## 2020-06-22 NOTE — PROGRESS NOTES
Pt very frustrated about recurrent BV- states that odor is embarrassing, vag cxs done- only clumpy d/c seen- most likely the metrogel she used only 2 days ago.

## 2020-06-26 ENCOUNTER — LAB ENCOUNTER (OUTPATIENT)
Dept: LAB | Facility: HOSPITAL | Age: 30
End: 2020-06-26
Attending: OBSTETRICS & GYNECOLOGY
Payer: MEDICAID

## 2020-06-26 ENCOUNTER — TELEPHONE (OUTPATIENT)
Dept: OBGYN CLINIC | Facility: CLINIC | Age: 30
End: 2020-06-26

## 2020-06-26 DIAGNOSIS — Z11.3 SCREENING EXAMINATION FOR STD (SEXUALLY TRANSMITTED DISEASE): ICD-10-CM

## 2020-06-26 DIAGNOSIS — Z11.3 SCREENING EXAMINATION FOR STD (SEXUALLY TRANSMITTED DISEASE): Primary | ICD-10-CM

## 2020-06-26 PROBLEM — O23.599 BACTERIAL VAGINOSIS IN PREGNANCY (HCC): Status: ACTIVE | Noted: 2020-06-26

## 2020-06-26 PROBLEM — O23.599 BACTERIAL VAGINOSIS IN PREGNANCY: Status: ACTIVE | Noted: 2020-06-26

## 2020-06-26 PROBLEM — B96.89 BACTERIAL VAGINOSIS IN PREGNANCY (HCC): Status: ACTIVE | Noted: 2020-06-26

## 2020-06-26 PROBLEM — B96.89 BACTERIAL VAGINOSIS IN PREGNANCY: Status: ACTIVE | Noted: 2020-06-26

## 2020-06-26 PROCEDURE — 86780 TREPONEMA PALLIDUM: CPT

## 2020-06-26 PROCEDURE — 86803 HEPATITIS C AB TEST: CPT

## 2020-06-26 PROCEDURE — 36415 COLL VENOUS BLD VENIPUNCTURE: CPT

## 2020-06-26 PROCEDURE — 87591 N.GONORRHOEAE DNA AMP PROB: CPT

## 2020-06-26 PROCEDURE — 87389 HIV-1 AG W/HIV-1&-2 AB AG IA: CPT

## 2020-06-26 PROCEDURE — 87491 CHLMYD TRACH DNA AMP PROBE: CPT

## 2020-06-26 PROCEDURE — 87340 HEPATITIS B SURFACE AG IA: CPT

## 2020-06-26 RX ORDER — METRONIDAZOLE 7.5 MG/G
GEL VAGINAL
Qty: 1 TUBE | Refills: 0 | Status: ON HOLD | OUTPATIENT
Start: 2020-06-26 | End: 2020-08-28

## 2020-06-26 RX ORDER — FLUCONAZOLE 150 MG/1
TABLET ORAL
Qty: 2 TABLET | Refills: 0 | Status: ON HOLD | OUTPATIENT
Start: 2020-06-26 | End: 2020-08-28

## 2020-06-26 NOTE — TELEPHONE ENCOUNTER
PER MD MEETING THIS MORNING, PT TO USE METROGEL, 1 APPLICATOR TWICE WEEKLY FOR 5 WEEKS. PT NOTIFIED OF RECS AND ASKED IF VAG CULTURE DONE BY CAP IS RESULTED. IT SHOWS BV AND 4+ YEAST. PT WANTS TO BE CHECKED FOR EVERY STD POSSIBLE.       PER CAP, OK TO GI

## 2020-07-08 ENCOUNTER — TELEPHONE (OUTPATIENT)
Dept: OBGYN CLINIC | Facility: CLINIC | Age: 30
End: 2020-07-08

## 2020-07-08 RX ORDER — NYSTATIN AND TRIAMCINOLONE ACETONIDE 100000; 1 [USP'U]/G; MG/G
OINTMENT TOPICAL
Qty: 1 TUBE | Refills: 0 | Status: SHIPPED | OUTPATIENT
Start: 2020-07-08 | End: 2020-08-04

## 2020-07-08 NOTE — TELEPHONE ENCOUNTER
Received PA for for pts nystatin-triamcinolone ointment. Called pharmacy to ask pharmacist, Ty Canas, to split medication into two so insurance will cover. Pharmacist verbalized understanding.

## 2020-07-08 NOTE — TELEPHONE ENCOUNTER
Pt is 31w5d, reports frustration over recurrent BV and Yeast infections throughout this pregnancy. Last vag cx done on 6/22/20 and per MD meeting pt to use Metrogel twice weekly for 5 weeks.  Pt was also prescribed Diflucan and reports she took the first do

## 2020-07-08 NOTE — TELEPHONE ENCOUNTER
Pt informed of [facility] recs below. Pt stated \"well what am I supposed to do about this yeast\"? Pt asking for RX for diflucan x 2. Message to [address] if ok for Diflucan as well as mycolog?

## 2020-07-08 NOTE — TELEPHONE ENCOUNTER
Can discuss w/ next MD sees in office.  In mean time can give her mycolog to use topically externally bid until next appt

## 2020-07-08 NOTE — TELEPHONE ENCOUNTER
LM with pt stating [facility] recs and informed pt RX has been sent to pharmacy. Pt informed she can start medication today.

## 2020-07-11 ENCOUNTER — ROUTINE PRENATAL (OUTPATIENT)
Dept: OBGYN CLINIC | Facility: CLINIC | Age: 30
End: 2020-07-11
Payer: MEDICAID

## 2020-07-11 VITALS
DIASTOLIC BLOOD PRESSURE: 80 MMHG | WEIGHT: 155 LBS | HEART RATE: 93 BPM | BODY MASS INDEX: 28 KG/M2 | SYSTOLIC BLOOD PRESSURE: 119 MMHG

## 2020-07-11 DIAGNOSIS — Z34.83 ENCOUNTER FOR SUPERVISION OF OTHER NORMAL PREGNANCY IN THIRD TRIMESTER: Primary | ICD-10-CM

## 2020-07-11 LAB
APPEARANCE: CLEAR
MULTISTIX LOT#: 1044 NUMERIC
URINE-COLOR: YELLOW

## 2020-07-11 PROCEDURE — 81002 URINALYSIS NONAUTO W/O SCOPE: CPT | Performed by: OBSTETRICS & GYNECOLOGY

## 2020-07-11 PROCEDURE — 0502F SUBSEQUENT PRENATAL CARE: CPT | Performed by: OBSTETRICS & GYNECOLOGY

## 2020-07-12 PROBLEM — Z30.2 REQUEST FOR STERILIZATION: Status: ACTIVE | Noted: 2020-07-12

## 2020-07-17 ENCOUNTER — TELEPHONE (OUTPATIENT)
Dept: OBGYN CLINIC | Facility: CLINIC | Age: 30
End: 2020-07-17

## 2020-07-17 RX ORDER — METRONIDAZOLE 7.5 MG/G
1 GEL VAGINAL
Qty: 1 TUBE | Refills: 0 | Status: ON HOLD | OUTPATIENT
Start: 2020-07-20 | End: 2020-08-28

## 2020-07-17 NOTE — TELEPHONE ENCOUNTER
Current Outpatient Medications   Medication Sig Dispense Refill   • nystatin-triamcinolone 020074-8.1 UNIT/GM-% External Ointment Apply externally to affected area twice daily 1 Tube 0   • metRONIDAZOLE 0.75 % Vaginal Gel Place 1 applicator vaginally at be

## 2020-07-25 ENCOUNTER — ROUTINE PRENATAL (OUTPATIENT)
Dept: OBGYN CLINIC | Facility: CLINIC | Age: 30
End: 2020-07-25
Payer: MEDICAID

## 2020-07-25 VITALS
DIASTOLIC BLOOD PRESSURE: 78 MMHG | SYSTOLIC BLOOD PRESSURE: 124 MMHG | WEIGHT: 157 LBS | BODY MASS INDEX: 29 KG/M2 | HEART RATE: 85 BPM

## 2020-07-25 DIAGNOSIS — N76.3 CHRONIC VULVITIS: ICD-10-CM

## 2020-07-25 DIAGNOSIS — Z34.93 ENCOUNTER FOR SUPERVISION OF NORMAL PREGNANCY IN THIRD TRIMESTER, UNSPECIFIED GRAVIDITY: Primary | ICD-10-CM

## 2020-07-25 LAB
MULTISTIX LOT#: NORMAL NUMERIC
PH, URINE: 7.5 (ref 4.5–8)
SPECIFIC GRAVITY: 1 (ref 1–1.03)
URINE-COLOR: YELLOW

## 2020-07-25 PROCEDURE — 3074F SYST BP LT 130 MM HG: CPT | Performed by: OBSTETRICS & GYNECOLOGY

## 2020-07-25 PROCEDURE — 90715 TDAP VACCINE 7 YRS/> IM: CPT | Performed by: OBSTETRICS & GYNECOLOGY

## 2020-07-25 PROCEDURE — 0502F SUBSEQUENT PRENATAL CARE: CPT | Performed by: OBSTETRICS & GYNECOLOGY

## 2020-07-25 PROCEDURE — 81002 URINALYSIS NONAUTO W/O SCOPE: CPT | Performed by: OBSTETRICS & GYNECOLOGY

## 2020-07-25 PROCEDURE — 3078F DIAST BP <80 MM HG: CPT | Performed by: OBSTETRICS & GYNECOLOGY

## 2020-07-25 PROCEDURE — 90471 IMMUNIZATION ADMIN: CPT | Performed by: OBSTETRICS & GYNECOLOGY

## 2020-07-25 NOTE — PROGRESS NOTES
Tdap.  Frustrated with recurrent BV. Has been taking maintenance dosing of Metrogel 2x/week since 6/26. States she has not seen improvement in symptoms. BV screen. Exam-- external erythema with minimal vaginal discharge.   Taking nystatin/triamcinolone

## 2020-07-25 NOTE — PROGRESS NOTES
Administered Tdap per JLK on left deltoid. Pt tolerated injection well. VIS given. Encouraged to call with any questions or concerns. Pt verbalized understanding.

## 2020-07-27 ENCOUNTER — TELEPHONE (OUTPATIENT)
Dept: OBGYN CLINIC | Facility: CLINIC | Age: 30
End: 2020-07-27

## 2020-07-27 LAB
GENITAL VAGINOSIS SCREEN: POSITIVE
TRICHOMONAS SCREEN: NEGATIVE

## 2020-07-27 RX ORDER — METRONIDAZOLE 500 MG/1
500 TABLET ORAL 2 TIMES DAILY
Qty: 14 TABLET | Refills: 0 | Status: SHIPPED | OUTPATIENT
Start: 2020-07-27 | End: 2020-08-03

## 2020-07-27 NOTE — TELEPHONE ENCOUNTER
Pt informed of [facility] recs below and verbalized understanding. Pt tearful on the phone and frustrated that she keeps getting recurrent BV. Pt stated that prior to pregnancy she never had BV besides one time when she was 12years old.  Flagyl and Terazol sent

## 2020-07-27 NOTE — PROGRESS NOTES
BV screen positive for BV and yeast.    Add flagyl 500 mg bid x 7 days. Continue metrogel 2x/week.   After she finishes Flagyl, then Terazol 7 (hold metrogel during terazol use)

## 2020-07-27 NOTE — TELEPHONE ENCOUNTER
----- Message from Marci Gan MD sent at 7/27/2020 11:23 AM CDT -----  BV screen positive for BV and yeast.    Add flagyl 500 mg bid x 7 days. Continue metrogel 2x/week.   After she finishes Flagyl, then Terazol 7 (hold metrogel during terazol use)

## 2020-07-29 ENCOUNTER — EMPLOYEE HEALTH (OUTPATIENT)
Dept: OTHER | Age: 30
End: 2020-07-29

## 2020-07-29 DIAGNOSIS — Z20.822 SUSPECTED COVID-19 VIRUS INFECTION: Primary | ICD-10-CM

## 2020-07-30 ENCOUNTER — TELEPHONE (OUTPATIENT)
Dept: OBGYN CLINIC | Facility: CLINIC | Age: 30
End: 2020-07-30

## 2020-07-30 ENCOUNTER — EMPLOYEE HEALTH (OUTPATIENT)
Dept: OTHER | Age: 30
End: 2020-07-30

## 2020-07-30 NOTE — TELEPHONE ENCOUNTER
Pt. States that she is pregnant and received TDAP inj this past Sat., and she is concerned about having Diarrhea which started on Sunday and pt continues to have diarrhea, and pt.  Has missed work all week,

## 2020-07-30 NOTE — TELEPHONE ENCOUNTER
PT NOTIFIED OF RECS. ASKED HER TO WAIT UNTIL SHE HAS COVID TEST RESULT BEFORE SCHEDULING AN APPT. ALSO DISCUSSED IMODIUM AND TO FOLLOW PACKAGE DIRECTIONS.

## 2020-07-30 NOTE — TELEPHONE ENCOUNTER
34w6d Pt calling to report multiple symptoms. Pt reports \"swelling and pressure down there from this BV issue\". Pt states she has not had relief at all with Metrogel, Diflucan, Terazol, or Nystatin/triamicinolne cream. Pt reports with walking her vagina \"rubs together and it is hurts due to the swelling from this ongoing issue\". Pt reports receiving TDap on 7/25/2020 to left deltoid and pt states her arm is sore. Pt reports diarrhea started on 7/25/2020 and pt had vomiting on 7/26/2020 and 7/27/2020. Pt reports having diarrhea at least 4 times daily. 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 Pt very upset that her dept did not inform her until days later. Pt states she called [employer] today and is being tested for Covid tomorrow 7/31/2020. Pt reports +FM. Pt denies fever, HA, sore throat, dry cough, body aches, loss of smell/taste, LOF, pain, bleeding, spotting, or contractions. Pt is very upset and unsure if pt should start Maternity leave now due swollen vagina from recurrent BV. Advised pt for diarrhea pt can take Imodium AD and to eat minimum residue diet. Advised pt sore arm after Tdap is common. Advised pt the decision of Maternity leave early is up to pt. Pt asking for further recs for diarrhea and swelling to vagina from BV. Informed pt message will be sent to CAP (on-call). Pt verbalized understanding.

## 2020-07-30 NOTE — TELEPHONE ENCOUNTER
She needs to come in for exam in order to give recs. Unfortunately vaginal issues are not a covered reason for maternity leave.

## 2020-07-31 ENCOUNTER — LAB ENCOUNTER (OUTPATIENT)
Dept: LAB | Facility: HOSPITAL | Age: 30
End: 2020-07-31
Attending: OBSTETRICS & GYNECOLOGY
Payer: MEDICAID

## 2020-07-31 ENCOUNTER — LAB SERVICES (OUTPATIENT)
Dept: LAB | Age: 30
End: 2020-07-31

## 2020-07-31 DIAGNOSIS — Z34.93 ENCOUNTER FOR SUPERVISION OF NORMAL PREGNANCY IN THIRD TRIMESTER, UNSPECIFIED GRAVIDITY: ICD-10-CM

## 2020-07-31 DIAGNOSIS — Z20.822 SUSPECTED COVID-19 VIRUS INFECTION: ICD-10-CM

## 2020-07-31 LAB
DEPRECATED RDW RBC AUTO: 39.6 FL (ref 35.1–46.3)
ERYTHROCYTE [DISTWIDTH] IN BLOOD BY AUTOMATED COUNT: 12.4 % (ref 11–15)
HCT VFR BLD AUTO: 35.2 % (ref 35–48)
HGB BLD-MCNC: 11.8 G/DL (ref 12–16)
MCH RBC QN AUTO: 29.5 PG (ref 26–34)
MCHC RBC AUTO-ENTMCNC: 33.5 G/DL (ref 31–37)
MCV RBC AUTO: 88 FL (ref 80–100)
PLATELET # BLD AUTO: 165 10(3)UL (ref 150–450)
RBC # BLD AUTO: 4 X10(6)UL (ref 3.8–5.3)
WBC # BLD AUTO: 6.6 X10(3) UL (ref 4–11)

## 2020-07-31 PROCEDURE — 87389 HIV-1 AG W/HIV-1&-2 AB AG IA: CPT

## 2020-07-31 PROCEDURE — 85027 COMPLETE CBC AUTOMATED: CPT

## 2020-07-31 PROCEDURE — 86780 TREPONEMA PALLIDUM: CPT

## 2020-07-31 PROCEDURE — 87635 SARS-COV-2 COVID-19 AMP PRB: CPT | Performed by: HOSPITALIST

## 2020-07-31 PROCEDURE — 36415 COLL VENOUS BLD VENIPUNCTURE: CPT

## 2020-08-01 ENCOUNTER — EMPLOYEE HEALTH (OUTPATIENT)
Dept: OTHER | Age: 30
End: 2020-08-01

## 2020-08-01 LAB
SARS-COV-2 RNA RESP QL NAA+PROBE: NOT DETECTED
SERVICE CMNT-IMP: NORMAL
SPECIMEN SOURCE: NORMAL

## 2020-08-03 LAB — T PALLIDUM AB SER QL: NEGATIVE

## 2020-08-03 RX ORDER — NYSTATIN AND TRIAMCINOLONE ACETONIDE 100000; 1 [USP'U]/G; MG/G
OINTMENT TOPICAL
Qty: 1 TUBE | Refills: 0 | OUTPATIENT
Start: 2020-08-03

## 2020-08-04 RX ORDER — NYSTATIN AND TRIAMCINOLONE ACETONIDE 100000; 1 [USP'U]/G; MG/G
OINTMENT TOPICAL
Qty: 1 TUBE | Refills: 0 | Status: ON HOLD | OUTPATIENT
Start: 2020-08-04 | End: 2020-08-28

## 2020-08-04 NOTE — TELEPHONE ENCOUNTER
35w4d msg to [facility] Jaleesa Sultana on-call to advise. See MINISTERIOK's visit notes from 7/25.

## 2020-08-05 ENCOUNTER — ROUTINE PRENATAL (OUTPATIENT)
Dept: OBGYN CLINIC | Facility: CLINIC | Age: 30
End: 2020-08-05
Payer: MEDICAID

## 2020-08-05 VITALS
SYSTOLIC BLOOD PRESSURE: 126 MMHG | WEIGHT: 160.63 LBS | DIASTOLIC BLOOD PRESSURE: 86 MMHG | BODY MASS INDEX: 29 KG/M2 | HEART RATE: 65 BPM

## 2020-08-05 DIAGNOSIS — N89.8 VAGINAL DISCHARGE: ICD-10-CM

## 2020-08-05 DIAGNOSIS — Z34.93 ENCOUNTER FOR SUPERVISION OF NORMAL PREGNANCY IN THIRD TRIMESTER, UNSPECIFIED GRAVIDITY: Primary | ICD-10-CM

## 2020-08-05 LAB
MULTISTIX LOT#: 1044 NUMERIC
PH, URINE: 6.5 (ref 4.5–8)
SPECIFIC GRAVITY: 1.01 (ref 1–1.03)
UROBILINOGEN,SEMI-QN: 0 MG/DL (ref 0–1.9)

## 2020-08-05 PROCEDURE — 81002 URINALYSIS NONAUTO W/O SCOPE: CPT | Performed by: OBSTETRICS & GYNECOLOGY

## 2020-08-05 PROCEDURE — 3079F DIAST BP 80-89 MM HG: CPT | Performed by: OBSTETRICS & GYNECOLOGY

## 2020-08-05 PROCEDURE — 3074F SYST BP LT 130 MM HG: CPT | Performed by: OBSTETRICS & GYNECOLOGY

## 2020-08-05 PROCEDURE — 0502F SUBSEQUENT PRENATAL CARE: CPT | Performed by: OBSTETRICS & GYNECOLOGY

## 2020-08-05 RX ORDER — METRONIDAZOLE 7.5 MG/G
1 GEL VAGINAL
Qty: 1 TUBE | Refills: 0 | Status: ON HOLD | OUTPATIENT
Start: 2020-08-06 | End: 2020-08-28

## 2020-08-05 RX ORDER — NYSTATIN 100000 U/G
1 OINTMENT TOPICAL 2 TIMES DAILY
Status: ON HOLD | COMMUNITY
Start: 2020-07-08 | End: 2020-08-28

## 2020-08-05 NOTE — PROGRESS NOTES
NEEDS REFILL ON ALL THE MEDICATION THAT WERE PRESCIBED FOR VAGINA L ISSUE, STILL HAVING VAGINAL SWELLING AND DISCHARGE.

## 2020-08-05 NOTE — TELEPHONE ENCOUNTER
RECEIVED REQUEST FROM PHARMACY FOR PRIOR AUTHORIZATION. CALLED THE PHARMACY, SPOKE WITH LYLE AND ADVISED TO GIVE 2 SEPARATE RX'S NYSTATIN AND TRIAMCINOLONE SEPARATELY. HE RAN THOSE THROUGH AND THEY WERE COVERED BY THE INSURANCE.

## 2020-08-06 NOTE — PROGRESS NOTES
Vag cx done. GBS cx done. Should use metrogel twice weekly. Would recommend crisco externally. States using flagyl + metrogel finally helped w/ odor. RTC 1-2 weeks.  Pt turned in FMLA papers

## 2020-08-07 ENCOUNTER — TELEPHONE (OUTPATIENT)
Dept: OBGYN CLINIC | Facility: CLINIC | Age: 30
End: 2020-08-07

## 2020-08-07 LAB
GENITAL VAGINOSIS SCREEN: NEGATIVE
TRICHOMONAS SCREEN: NEGATIVE

## 2020-08-07 NOTE — TELEPHONE ENCOUNTER
RECEIVED REFILL REQUEST FOR TERCONAZOLE 0.4% AND FOR METRONIDAZOLE 500MG TABLETS. MARKED BOTH REQUESTS AS DENIED AND TO HAVE PT CALL THE OFFICE.

## 2020-08-08 RX ORDER — FLUCONAZOLE 150 MG/1
TABLET ORAL
Qty: 2 TABLET | Refills: 0 | OUTPATIENT
Start: 2020-08-08

## 2020-08-11 ENCOUNTER — TELEPHONE (OUTPATIENT)
Dept: OBGYN CLINIC | Facility: CLINIC | Age: 30
End: 2020-08-11

## 2020-08-11 NOTE — TELEPHONE ENCOUNTER
PT NOTIFIED [facility] SENT TO HER PHARMACY. STATES THE ONLY WAY THIS WORKS IS TO TAKE THE ORAL MEDICATION (DIFLUCAN) WITH IT.  MESSAGE TO K[facility] (ON CALL). OK FOR DIFLUCAN X2 ALSO?

## 2020-08-11 NOTE — TELEPHONE ENCOUNTER
Pt 36w4d calling to report she had a vaginal culture done on 8/5 that confirmed a yeast infection and was started on Diflucan x 2 tabs.  (Pt has had hx of recurrent BV and yeast infections throughout pregnancy) Pt stated she took the first dose of Diflucan

## 2020-08-11 NOTE — TELEPHONE ENCOUNTER
Message to [facility] (on call) to please advise message below.

## 2020-08-14 ENCOUNTER — ROUTINE PRENATAL (OUTPATIENT)
Dept: OBGYN CLINIC | Facility: CLINIC | Age: 30
End: 2020-08-14
Payer: MEDICAID

## 2020-08-14 VITALS
SYSTOLIC BLOOD PRESSURE: 119 MMHG | BODY MASS INDEX: 29 KG/M2 | WEIGHT: 160.38 LBS | HEART RATE: 76 BPM | DIASTOLIC BLOOD PRESSURE: 84 MMHG

## 2020-08-14 DIAGNOSIS — Z34.93 ENCOUNTER FOR SUPERVISION OF NORMAL PREGNANCY IN THIRD TRIMESTER, UNSPECIFIED GRAVIDITY: Primary | ICD-10-CM

## 2020-08-14 LAB
MULTISTIX LOT#: 1044 NUMERIC
PH, URINE: 7 (ref 4.5–8)
SPECIFIC GRAVITY: 1.01 (ref 1–1.03)
UROBILINOGEN,SEMI-QN: 0 MG/DL (ref 0–1.9)

## 2020-08-14 PROCEDURE — 3079F DIAST BP 80-89 MM HG: CPT | Performed by: OBSTETRICS & GYNECOLOGY

## 2020-08-14 PROCEDURE — 81002 URINALYSIS NONAUTO W/O SCOPE: CPT | Performed by: OBSTETRICS & GYNECOLOGY

## 2020-08-14 PROCEDURE — 0502F SUBSEQUENT PRENATAL CARE: CPT | Performed by: OBSTETRICS & GYNECOLOGY

## 2020-08-14 PROCEDURE — 3074F SYST BP LT 130 MM HG: CPT | Performed by: OBSTETRICS & GYNECOLOGY

## 2020-08-14 NOTE — PROGRESS NOTES
No complaints today. Feels good fetal movement. No longer wants BTL if has CS.  Reviewed mini-pill PP  RTC 1 wk

## 2020-08-20 ENCOUNTER — ROUTINE PRENATAL (OUTPATIENT)
Dept: OBGYN CLINIC | Facility: CLINIC | Age: 30
End: 2020-08-20
Payer: MEDICAID

## 2020-08-20 VITALS
HEART RATE: 106 BPM | BODY MASS INDEX: 29 KG/M2 | WEIGHT: 160.19 LBS | DIASTOLIC BLOOD PRESSURE: 83 MMHG | SYSTOLIC BLOOD PRESSURE: 119 MMHG

## 2020-08-20 DIAGNOSIS — Z34.83 ENCOUNTER FOR SUPERVISION OF OTHER NORMAL PREGNANCY IN THIRD TRIMESTER: Primary | ICD-10-CM

## 2020-08-20 DIAGNOSIS — N89.8 VAGINAL IRRITATION: ICD-10-CM

## 2020-08-20 PROCEDURE — 3074F SYST BP LT 130 MM HG: CPT | Performed by: OBSTETRICS & GYNECOLOGY

## 2020-08-20 PROCEDURE — 0502F SUBSEQUENT PRENATAL CARE: CPT | Performed by: OBSTETRICS & GYNECOLOGY

## 2020-08-20 PROCEDURE — 81002 URINALYSIS NONAUTO W/O SCOPE: CPT | Performed by: OBSTETRICS & GYNECOLOGY

## 2020-08-20 PROCEDURE — 3079F DIAST BP 80-89 MM HG: CPT | Performed by: OBSTETRICS & GYNECOLOGY

## 2020-08-22 ENCOUNTER — TELEPHONE (OUTPATIENT)
Dept: OBGYN CLINIC | Facility: CLINIC | Age: 30
End: 2020-08-22

## 2020-08-22 LAB
GENITAL VAGINOSIS SCREEN: NEGATIVE
TRICHOMONAS SCREEN: NEGATIVE

## 2020-08-27 ENCOUNTER — ANESTHESIA EVENT (OUTPATIENT)
Dept: OBGYN UNIT | Facility: HOSPITAL | Age: 30
End: 2020-08-27
Payer: MEDICAID

## 2020-08-27 ENCOUNTER — TELEPHONE (OUTPATIENT)
Dept: OBGYN CLINIC | Facility: CLINIC | Age: 30
End: 2020-08-27

## 2020-08-27 ENCOUNTER — ANESTHESIA (OUTPATIENT)
Dept: OBGYN UNIT | Facility: HOSPITAL | Age: 30
End: 2020-08-27
Payer: MEDICAID

## 2020-08-27 ENCOUNTER — HOSPITAL ENCOUNTER (INPATIENT)
Facility: HOSPITAL | Age: 30
LOS: 2 days | Discharge: HOME | End: 2020-08-29
Attending: OBSTETRICS & GYNECOLOGY | Admitting: OBSTETRICS & GYNECOLOGY
Payer: MEDICAID

## 2020-08-27 PROBLEM — Z34.90 PREGNANCY (HCC): Status: ACTIVE | Noted: 2020-08-27

## 2020-08-27 PROBLEM — Z34.90 PREGNANCY: Status: ACTIVE | Noted: 2020-08-27

## 2020-08-27 LAB
ALBUMIN SERPL-MCNC: 2.9 G/DL (ref 3.4–5)
ALBUMIN/GLOB SERPL: 0.6 {RATIO} (ref 1–2)
ALP LIVER SERPL-CCNC: 238 U/L (ref 37–98)
ALT SERPL-CCNC: 16 U/L (ref 13–56)
ANION GAP SERPL CALC-SCNC: 8 MMOL/L (ref 0–18)
ANTIBODY SCREEN: NEGATIVE
AST SERPL-CCNC: 33 U/L (ref 15–37)
BASOPHILS # BLD AUTO: 0.02 X10(3) UL (ref 0–0.2)
BASOPHILS NFR BLD AUTO: 0.3 %
BILIRUB SERPL-MCNC: 0.9 MG/DL (ref 0.1–2)
BUN BLD-MCNC: 3 MG/DL (ref 7–18)
BUN/CREAT SERPL: 3.8 (ref 10–20)
CALCIUM BLD-MCNC: 8.9 MG/DL (ref 8.5–10.1)
CHLORIDE SERPL-SCNC: 107 MMOL/L (ref 98–112)
CO2 SERPL-SCNC: 23 MMOL/L (ref 21–32)
CREAT BLD-MCNC: 0.8 MG/DL (ref 0.55–1.02)
CREAT UR-SCNC: 26.5 MG/DL
DEPRECATED RDW RBC AUTO: 39.2 FL (ref 35.1–46.3)
EOSINOPHIL # BLD AUTO: 0.04 X10(3) UL (ref 0–0.7)
EOSINOPHIL NFR BLD AUTO: 0.6 %
ERYTHROCYTE [DISTWIDTH] IN BLOOD BY AUTOMATED COUNT: 12.7 % (ref 11–15)
GLOBULIN PLAS-MCNC: 4.7 G/DL (ref 2.8–4.4)
GLUCOSE BLD-MCNC: 60 MG/DL (ref 70–99)
HCT VFR BLD AUTO: 33.9 % (ref 35–48)
HGB BLD-MCNC: 12.1 G/DL (ref 12–16)
IMM GRANULOCYTES # BLD AUTO: 0.02 X10(3) UL (ref 0–1)
IMM GRANULOCYTES NFR BLD: 0.3 %
LYMPHOCYTES # BLD AUTO: 1.57 X10(3) UL (ref 1–4)
LYMPHOCYTES NFR BLD AUTO: 25.3 %
M PROTEIN MFR SERPL ELPH: 7.6 G/DL (ref 6.4–8.2)
MCH RBC QN AUTO: 30.9 PG (ref 26–34)
MCHC RBC AUTO-ENTMCNC: 35.7 G/DL (ref 31–37)
MCV RBC AUTO: 86.5 FL (ref 80–100)
MONOCYTES # BLD AUTO: 0.5 X10(3) UL (ref 0.1–1)
MONOCYTES NFR BLD AUTO: 8.1 %
NEUTROPHILS # BLD AUTO: 4.06 X10 (3) UL (ref 1.5–7.7)
NEUTROPHILS # BLD AUTO: 4.06 X10(3) UL (ref 1.5–7.7)
NEUTROPHILS NFR BLD AUTO: 65.4 %
OSMOLALITY SERPL CALC.SUM OF ELEC: 280 MOSM/KG (ref 275–295)
PLATELET # BLD AUTO: 170 10(3)UL (ref 150–450)
POTASSIUM SERPL-SCNC: 3.2 MMOL/L (ref 3.5–5.1)
PROT UR-MCNC: 5.3 MG/DL
PROT/CREAT UR-RTO: 0.2
RBC # BLD AUTO: 3.92 X10(6)UL (ref 3.8–5.3)
RH BLOOD TYPE: POSITIVE
SARS-COV-2 RNA RESP QL NAA+PROBE: NOT DETECTED
SODIUM SERPL-SCNC: 138 MMOL/L (ref 136–145)
WBC # BLD AUTO: 6.2 X10(3) UL (ref 4–11)

## 2020-08-27 PROCEDURE — 3E0P7VZ INTRODUCTION OF HORMONE INTO FEMALE REPRODUCTIVE, VIA NATURAL OR ARTIFICIAL OPENING: ICD-10-PCS | Performed by: OBSTETRICS & GYNECOLOGY

## 2020-08-27 PROCEDURE — 59409 OBSTETRICAL CARE: CPT | Performed by: OBSTETRICS & GYNECOLOGY

## 2020-08-27 PROCEDURE — 0HQ9XZZ REPAIR PERINEUM SKIN, EXTERNAL APPROACH: ICD-10-PCS | Performed by: OBSTETRICS & GYNECOLOGY

## 2020-08-27 PROCEDURE — 10907ZC DRAINAGE OF AMNIOTIC FLUID, THERAPEUTIC FROM PRODUCTS OF CONCEPTION, VIA NATURAL OR ARTIFICIAL OPENING: ICD-10-PCS | Performed by: OBSTETRICS & GYNECOLOGY

## 2020-08-27 RX ORDER — LABETALOL HYDROCHLORIDE 5 MG/ML
INJECTION, SOLUTION INTRAVENOUS
Status: COMPLETED
Start: 2020-08-27 | End: 2020-08-27

## 2020-08-27 RX ORDER — LIDOCAINE HYDROCHLORIDE 10 MG/ML
INJECTION, SOLUTION INFILTRATION; PERINEURAL
Status: COMPLETED | OUTPATIENT
Start: 2020-08-27 | End: 2020-08-27

## 2020-08-27 RX ORDER — HYDRALAZINE HYDROCHLORIDE 20 MG/ML
10 INJECTION INTRAMUSCULAR; INTRAVENOUS ONCE AS NEEDED
Status: COMPLETED | OUTPATIENT
Start: 2020-08-27 | End: 2020-08-28

## 2020-08-27 RX ORDER — SODIUM CHLORIDE, SODIUM LACTATE, POTASSIUM CHLORIDE, CALCIUM CHLORIDE 600; 310; 30; 20 MG/100ML; MG/100ML; MG/100ML; MG/100ML
INJECTION, SOLUTION INTRAVENOUS CONTINUOUS
Status: DISCONTINUED | OUTPATIENT
Start: 2020-08-27 | End: 2020-08-29 | Stop reason: HOSPADM

## 2020-08-27 RX ORDER — DEXTROSE, SODIUM CHLORIDE, SODIUM LACTATE, POTASSIUM CHLORIDE, AND CALCIUM CHLORIDE 5; .6; .31; .03; .02 G/100ML; G/100ML; G/100ML; G/100ML; G/100ML
INJECTION, SOLUTION INTRAVENOUS CONTINUOUS
Status: DISCONTINUED | OUTPATIENT
Start: 2020-08-27 | End: 2020-08-29

## 2020-08-27 RX ORDER — LABETALOL HYDROCHLORIDE 5 MG/ML
80 INJECTION, SOLUTION INTRAVENOUS ONCE AS NEEDED
Status: COMPLETED | OUTPATIENT
Start: 2020-08-27 | End: 2020-08-28

## 2020-08-27 RX ORDER — DIPHENHYDRAMINE HYDROCHLORIDE 50 MG/ML
12.5 INJECTION INTRAMUSCULAR; INTRAVENOUS EVERY 4 HOURS PRN
Status: DISCONTINUED | OUTPATIENT
Start: 2020-08-27 | End: 2020-08-28

## 2020-08-27 RX ORDER — LABETALOL HYDROCHLORIDE 5 MG/ML
20 INJECTION, SOLUTION INTRAVENOUS ONCE
Status: COMPLETED | OUTPATIENT
Start: 2020-08-27 | End: 2020-08-27

## 2020-08-27 RX ORDER — METHYLERGONOVINE MALEATE 0.2 MG/ML
INJECTION INTRAVENOUS
Status: DISPENSED
Start: 2020-08-27 | End: 2020-08-28

## 2020-08-27 RX ORDER — LABETALOL HYDROCHLORIDE 5 MG/ML
40 INJECTION, SOLUTION INTRAVENOUS ONCE AS NEEDED
Status: COMPLETED | OUTPATIENT
Start: 2020-08-27 | End: 2020-08-27

## 2020-08-27 RX ORDER — BUPIVACAINE HYDROCHLORIDE 2.5 MG/ML
INJECTION, SOLUTION EPIDURAL; INFILTRATION; INTRACAUDAL
Status: COMPLETED | OUTPATIENT
Start: 2020-08-27 | End: 2020-08-27

## 2020-08-27 RX ORDER — LIDOCAINE HYDROCHLORIDE AND EPINEPHRINE 15; 5 MG/ML; UG/ML
INJECTION, SOLUTION EPIDURAL
Status: COMPLETED | OUTPATIENT
Start: 2020-08-27 | End: 2020-08-27

## 2020-08-27 RX ORDER — EPHEDRINE SULFATE/0.9% NACL/PF 25 MG/5 ML
5 SYRINGE (ML) INTRAVENOUS AS NEEDED
Status: DISCONTINUED | OUTPATIENT
Start: 2020-08-27 | End: 2020-08-28

## 2020-08-27 RX ORDER — CALCIUM GLUCONATE 94 MG/ML
1 INJECTION, SOLUTION INTRAVENOUS ONCE AS NEEDED
Status: DISCONTINUED | OUTPATIENT
Start: 2020-08-27 | End: 2020-08-29 | Stop reason: HOSPADM

## 2020-08-27 RX ORDER — LIDOCAINE HYDROCHLORIDE 10 MG/ML
INJECTION, SOLUTION EPIDURAL; INFILTRATION; INTRACAUDAL; PERINEURAL
Status: DISCONTINUED
Start: 2020-08-27 | End: 2020-08-28 | Stop reason: WASHOUT

## 2020-08-27 RX ORDER — LABETALOL HYDROCHLORIDE 5 MG/ML
20 INJECTION, SOLUTION INTRAVENOUS ONCE AS NEEDED
Status: COMPLETED | OUTPATIENT
Start: 2020-08-27 | End: 2020-08-27

## 2020-08-27 RX ORDER — MISOPROSTOL 200 UG/1
TABLET ORAL
Status: DISPENSED
Start: 2020-08-27 | End: 2020-08-28

## 2020-08-27 RX ORDER — BUPIVACAINE HYDROCHLORIDE 2.5 MG/ML
15 INJECTION, SOLUTION EPIDURAL; INFILTRATION; INTRACAUDAL ONCE
Status: DISCONTINUED | OUTPATIENT
Start: 2020-08-27 | End: 2020-08-29

## 2020-08-27 RX ORDER — MISOPROSTOL 200 UG/1
1000 TABLET ORAL ONCE
Status: COMPLETED | OUTPATIENT
Start: 2020-08-28 | End: 2020-08-27

## 2020-08-27 RX ADMIN — LIDOCAINE HYDROCHLORIDE AND EPINEPHRINE 5 ML: 15; 5 INJECTION, SOLUTION EPIDURAL at 18:35:00

## 2020-08-27 RX ADMIN — BUPIVACAINE HYDROCHLORIDE 10 ML: 2.5 INJECTION, SOLUTION EPIDURAL; INFILTRATION; INTRACAUDAL at 18:35:00

## 2020-08-27 RX ADMIN — LIDOCAINE HYDROCHLORIDE 5 ML: 10 INJECTION, SOLUTION INFILTRATION; PERINEURAL at 18:35:00

## 2020-08-27 NOTE — TELEPHONE ENCOUNTER
Pt 38w6d calling to report that she isnt sure if she has been having ctxs or siena mckeon since around 3am this morning. Pt stated that the pain starts in the bottom part of pelvis an radiates up to the middle of her stomach and around her sides.  Pt state

## 2020-08-27 NOTE — PROGRESS NOTES
Pt is a 27year old female admitted to TR1/TR1-A. Patient presents with:  R/o Labor  R/o Rom: leaking fluid since 1000     Pt is  38w6d intra-uterine pregnancy. History obtained, pt. Oriented to room, staff, and plan of care.

## 2020-08-27 NOTE — ANESTHESIA PROCEDURE NOTES
Labor Analgesia  Date/Time: 8/27/2020 6:35 PM  Performed by: Dedrick Laughlin MD  Authorized by: Dedrick Laughlin MD       General Information and Staff    Start Time:  8/27/2020 6:28 PM  Anesthesiologist:  Dedrick Laughlin MD  Patient Location:  OB  Re

## 2020-08-27 NOTE — TELEPHONE ENCOUNTER
Pt is calling back with an update. States she drank 2 bottles of water, took 2 Tylenol and abath. She is still having contractions. Pt states they are every 2-3 minutes for the past hour at least.  They are lasting every 60 seconds.   Pt states her entir

## 2020-08-27 NOTE — ANESTHESIA PREPROCEDURE EVALUATION
Anesthesia PreOp Note    HPI:     Tamiko Ordoñez is a 27year old female who presents for preoperative consultation requested by: * No surgeons listed *    Date of Surgery: 8/27/2020    * No procedures listed *  Indication: * No pre-op diagnosis en Unknown at Unknown time  metRONIDAZOLE (METROGEL-VAGINAL) 0.75 % Vaginal Gel, Place 1 Application vaginally twice a week., Disp: 1 Tube, Rfl: 0, Unknown at Unknown time  nystatin-triamcinolone 800219-7.1 UNIT/GM-% External Ointment, Apply externally to aff Patti Cohen MD  fentaNYL 2mcg/ml & bupivacaine 1.25mg/ml epidural infusion, , Epidural, Continuous, Kind, Patti Cohen MD, Last Rate: 10 mL/hr at 08/27/20 1841, 10 mL/hr at 08/27/20 1841  fentaNYL citrate (SUBLIMAZE) 0.05 MG/ML injection 100 mcg, 100 mcg, E file        Gets together: Not on file        Attends Restoration service: Not on file        Active member of club or organization: Not on file        Attends meetings of clubs or organizations: Not on file        Relationship status: Not on file      Intim full  Dental - normal exam     Pulmonary - negative ROS and normal exam   Cardiovascular - negative ROS and normal exam    Neuro/Psych - negative ROS     GI/Hepatic/Renal - negative ROS     Endo/Other - negative ROS   Abdominal  - normal exam  (+) obese,

## 2020-08-28 PROBLEM — O14.90 PREECLAMPSIA: Status: ACTIVE | Noted: 2020-08-28

## 2020-08-28 PROBLEM — O14.90 PREECLAMPSIA (HCC): Status: ACTIVE | Noted: 2020-08-28

## 2020-08-28 LAB
BASOPHILS # BLD AUTO: 0.03 X10(3) UL (ref 0–0.2)
BASOPHILS NFR BLD AUTO: 0.2 %
DEPRECATED RDW RBC AUTO: 38.9 FL (ref 35.1–46.3)
EOSINOPHIL # BLD AUTO: 0 X10(3) UL (ref 0–0.7)
EOSINOPHIL NFR BLD AUTO: 0 %
ERYTHROCYTE [DISTWIDTH] IN BLOOD BY AUTOMATED COUNT: 12.6 % (ref 11–15)
HCT VFR BLD AUTO: 31 % (ref 35–48)
HGB BLD-MCNC: 10.9 G/DL (ref 12–16)
IMM GRANULOCYTES # BLD AUTO: 0.06 X10(3) UL (ref 0–1)
IMM GRANULOCYTES NFR BLD: 0.4 %
LYMPHOCYTES # BLD AUTO: 0.99 X10(3) UL (ref 1–4)
LYMPHOCYTES NFR BLD AUTO: 7.1 %
MAGNESIUM SERPL-MCNC: 5.6 MG/DL (ref 4.8–8.4)
MAGNESIUM SERPL-MCNC: 6.1 MG/DL (ref 4.8–8.4)
MAGNESIUM SERPL-MCNC: 6.8 MG/DL (ref 4.8–8.4)
MAGNESIUM SERPL-MCNC: 7.2 MG/DL (ref 4.8–8.4)
MCH RBC QN AUTO: 30.3 PG (ref 26–34)
MCHC RBC AUTO-ENTMCNC: 35.2 G/DL (ref 31–37)
MCV RBC AUTO: 86.1 FL (ref 80–100)
MONOCYTES # BLD AUTO: 0.82 X10(3) UL (ref 0.1–1)
MONOCYTES NFR BLD AUTO: 5.9 %
NEUTROPHILS # BLD AUTO: 12.04 X10 (3) UL (ref 1.5–7.7)
NEUTROPHILS # BLD AUTO: 12.04 X10(3) UL (ref 1.5–7.7)
NEUTROPHILS NFR BLD AUTO: 86.4 %
PLATELET # BLD AUTO: 158 10(3)UL (ref 150–450)
RBC # BLD AUTO: 3.6 X10(6)UL (ref 3.8–5.3)
WBC # BLD AUTO: 13.9 X10(3) UL (ref 4–11)

## 2020-08-28 RX ORDER — CARBOPROST TROMETHAMINE 250 UG/ML
250 INJECTION, SOLUTION INTRAMUSCULAR ONCE
Status: DISCONTINUED | OUTPATIENT
Start: 2020-08-28 | End: 2020-08-29

## 2020-08-28 RX ORDER — ONDANSETRON 2 MG/ML
4 INJECTION INTRAMUSCULAR; INTRAVENOUS EVERY 6 HOURS PRN
Status: DISCONTINUED | OUTPATIENT
Start: 2020-08-27 | End: 2020-08-29

## 2020-08-28 RX ORDER — LABETALOL HYDROCHLORIDE 5 MG/ML
INJECTION, SOLUTION INTRAVENOUS
Status: DISPENSED
Start: 2020-08-28 | End: 2020-08-28

## 2020-08-28 RX ORDER — BISACODYL 10 MG
10 SUPPOSITORY, RECTAL RECTAL ONCE AS NEEDED
Status: DISCONTINUED | OUTPATIENT
Start: 2020-08-27 | End: 2020-08-29

## 2020-08-28 RX ORDER — IBUPROFEN 600 MG/1
600 TABLET ORAL EVERY 6 HOURS PRN
Status: DISCONTINUED | OUTPATIENT
Start: 2020-08-28 | End: 2020-08-29

## 2020-08-28 RX ORDER — LABETALOL 100 MG/1
100 TABLET, FILM COATED ORAL 2 TIMES DAILY
Status: DISCONTINUED | OUTPATIENT
Start: 2020-08-28 | End: 2020-08-29

## 2020-08-28 RX ORDER — ACETAMINOPHEN 325 MG/1
650 TABLET ORAL EVERY 6 HOURS PRN
Status: DISCONTINUED | OUTPATIENT
Start: 2020-08-27 | End: 2020-08-29

## 2020-08-28 RX ORDER — DIAPER,BRIEF,INFANT-TODD,DISP
1 EACH MISCELLANEOUS EVERY 6 HOURS PRN
Status: DISCONTINUED | OUTPATIENT
Start: 2020-08-27 | End: 2020-08-29

## 2020-08-28 RX ORDER — DOCUSATE SODIUM 100 MG/1
100 CAPSULE, LIQUID FILLED ORAL
Status: DISCONTINUED | OUTPATIENT
Start: 2020-08-28 | End: 2020-08-29

## 2020-08-28 RX ORDER — AMMONIA INHALANTS 0.04 G/.3ML
0.3 INHALANT RESPIRATORY (INHALATION) AS NEEDED
Status: DISCONTINUED | OUTPATIENT
Start: 2020-08-27 | End: 2020-08-29

## 2020-08-28 RX ORDER — SIMETHICONE 80 MG
80 TABLET,CHEWABLE ORAL 3 TIMES DAILY PRN
Status: DISCONTINUED | OUTPATIENT
Start: 2020-08-27 | End: 2020-08-29

## 2020-08-28 NOTE — LACTATION NOTE
LACTATION NOTE - MOTHER      Evaluation Type: Inpatient    Problems identified  Problems identified: Knowledge deficit    Maternal history  Maternal history: PIH  Other/comment: on Magnesium    Breastfeeding goal  Breastfeeding goal: To maintain breast mil

## 2020-08-28 NOTE — L&D DELIVERY NOTE
[facility] HOSP - [facility]    Vaginal Delivery Note    Eufemia Hall Patient Status:  Inpatient    1990 MRN [id number]   Location [address] [facility] Attending Lewis Gonzalez MD   Hosp Day # 0 PCP MD Carley Weeks

## 2020-08-28 NOTE — PROGRESS NOTES
Pt is a 27year old female admitted to triage 1. Patient presents with:  R/o Labor  R/o Rom: leaking fluid since 1000     Pt is  38w6d intra-uterine pregnancy. History obtained, pt. Oriented to room, staff, and plan of care.

## 2020-08-28 NOTE — H&P
[facility] Patient Status:  Inpatient    1990 MRN [id number]   Location [facility] Attending Thomas Sheikh MD   Hosp Day # 0 PCP Celeste Fink MD     Date Medical History:   Diagnosis Date   • Cornea abrasion     Chemical abrasion inferior cornea, OD   • Cup to disc asymmetry     OU   • History of abnormal cervical Pap smear    • History of pregnancy , 2014-induced ;  Disp: 1 Tube, Rfl: 0, Unknown at Unknown time  metRONIDAZOLE 0.75 % Vaginal Gel, Place 1 applicator vaginally at bedtime twice a week for 5 weeks, Disp: 1 Tube, Rfl: 0, Unknown at Unknown time  fluconazole (DIFLUCAN) 150 MG Oral Tab, Take 1 tablet by mouth KETUR NEG 12/17/2013    BILUR NEG 12/17/2013    BLOODURINE NEG 12/17/2013    NITRITE neg 08/20/2020    UROBILINOGEN <2.0 12/17/2013    LEUUR MOD (A) 12/17/2013          No results found.               Assessment/Plan:     IUP 38w6d in / with Active phase

## 2020-08-28 NOTE — PROGRESS NOTES
Initial Anesthesia Post-op Note    Patient: Jenaro Henry  Procedure(s) Performed: INCISE AND DRAIN AXILLARY ABSCESS  PATIENT 540 POUNDS  Anesthesia type: General    Vital Last Value   Temperature 36.2 °C (97.2 °F) (06/22/17 1140)   Pulse 85 (06/22/17 1140)   Respiratory Rate 12 (06/22/17 1140)   Non-Invasive   Blood Pressure 139/55 (06/22/17 1140)   Arterial  Blood Pressure     Pulse Oximetry 99 % (06/22/17 1140)     Last 24 I/O:   Intake/Output Summary (Last 24 hours) at 06/22/17 1142  Last data filed at 06/22/17 1129   Gross per 24 hour   Intake             3604 ml   Output             2400 ml   Net             1204 ml       PATIENT LOCATION: PACU Phase 1  POST-OP VITAL SIGNS: stable  LEVEL OF CONSCIOUSNESS: participates in exam, awake, oriented, answers questions appropriately and alert  RESPIRATORY STATUS: BIPAP  CARDIOVASCULAR: blood pressure returned to baseline  HYDRATION: euvolemic    PAIN MANAGEMENT: well controlled  NAUSEA: None  AIRWAY PATENCY: patent  POST-OP ASSESSMENT: no complications and patient tolerated procedure well with no complications  COMPLICATIONS: none  HANDOFF:  Handoff to receiving nurse was performed and questions were answered (see post-op vitals and initial note)       pt. transferred to LDR 6 for admission of labor. Bedside report given to ARIS Stewart

## 2020-08-28 NOTE — PAYOR COMM NOTE
--------------  ADMISSION REVIEW     Payor: Petey Rios #:  [id number]  Authorization Number: [id number]    Admit date: 8/27/20  Admit time: 1715       Admitting Physician: Neva Aponte MD  Attending Physician: Location [address] [place] [address] Attending Lissy Aguilar MD   Hosp Day # 0 PCP David Noguera MD     Date of Admission:  2020      HPI:   Tamiko Orodñez is a 27year old  female, current EGA of 38w6d with an estimated d • History of abnormal cervical Pap smear    • History of pregnancy , 2014-induced ; 2014-\"Prince Jimmy Jaffe" 1st degree perineal laceration - vaginal wall.  Lexi was a full term live born 7 pound 9 ounce male de weeks, Disp: 1 Tube, Rfl: 0, Unknown at Unknown time  fluconazole (DIFLUCAN) 150 MG Oral Tab, Take 1 tablet by mouth now and repeat dose in 3 days, Disp: 2 tablet, Rfl: 0, Unknown at Unknown time  metRONIDAZOLE (METROGEL-VAGINAL) 0.75 % Vaginal Gel, Place CLARK MOD (A) 12/17/2013          No results found. Assessment/Plan:     IUP 38w6d in / with Active phase labor, severe range BPs    Obstetrical history significant for pre-eclampsia.  Patient Active Problem List:     Recurrent vaginitis     Ex injection 20 mg     Date Action Dose Route User    8/27/2020 1721 Given 20 mg Intravenous Eloise Josue, RHONA      Labetalol HCl (TRANDATE) injection 20 mg     Date Action Dose Route User    8/27/2020 2150 Given 20 mg Intravenous Jo-Ann Yee, RHONA      Lab Units (none) Ted Delgado RN          REVIEWER COMMENTS:     OTHER:

## 2020-08-28 NOTE — ANESTHESIA POSTPROCEDURE EVALUATION
Patient: Arely Hall    Procedure Summary     Date:  08/27/20 Room / Location:      Anesthesia Start:  [facility] Anesthesia Stop:  2108    Procedure:  LABOR ANALGESIA Diagnosis:      Scheduled Providers:   Anesthesiologist:  MD MICHELLE Narayanan

## 2020-08-28 NOTE — DISCHARGE SUMMARY
[facility]    Discharge Summary    Beba Hall Patient Status:  Inpatient    1990 MRN [id number]   Location [facility] Attending Carina Espinoza MD   Hosp Day # 0       Admit date:  2020

## 2020-08-28 NOTE — PLAN OF CARE
Problem: PAIN - ADULT  Goal: Verbalizes/displays adequate comfort level or patient's stated pain goal  Description  INTERVENTIONS:  - Encourage pt to monitor pain and request assistance  - Assess pain using appropriate pain scale  - Administer analgesics Progressing  Goal: Optimize infant feeding at the breast  Description  INTERVENTIONS:  - Initiate breast feeding within first hour after birth. - Monitor effectiveness of current breast feeding efforts.   - Assess support systems available to mother/famil interactions. - Assess caregiver's emotional status and coping mechanisms. - Encourage caregiver to participate in  daily care. - Assess support systems available to mother/family.  - Provide /case management support as needed.   Marlon Teixeira

## 2020-08-28 NOTE — PROGRESS NOTES
[facility] HOSP - [facility]    OB/Gyne Postpartum Preeclampsia Progress Note      Berneta Child Love Patient Status:  Inpatient    1990 MRN [id number]   Location [facility] Attending Caroline Schmidt MD   [facility] Day # 1 P GFR, Non- 99 >=60    GFR, -American 114 >=60    ALT 16 13 - 56 U/L    AST 33 15 - 37 U/L    Alkaline Phosphatase 238 (H) 37 - 98 U/L    Bilirubin, Total 0.9 0.1 - 2.0 mg/dL    Total Protein 7.6 6.4 - 8.2 g/dL    Albumin 2.9 (L) 3.4 - Therapy 5.6 4.8 - 8.4 mg/dL   MAGNESIUM (MG SULFATE THERAPY)    Collection Time: 08/28/20  6:12 AM   Result Value Ref Range    MG Sulfate Therapy 6.1 4.8 - 8.4 mg/dL   CBC W/ DIFFERENTIAL    Collection Time: 08/28/20  6:12 AM   Result Value Ref Range    WB

## 2020-08-29 VITALS
RESPIRATION RATE: 16 BRPM | TEMPERATURE: 98 F | WEIGHT: 160 LBS | HEIGHT: 62 IN | SYSTOLIC BLOOD PRESSURE: 142 MMHG | OXYGEN SATURATION: 99 % | BODY MASS INDEX: 29.44 KG/M2 | HEART RATE: 64 BPM | DIASTOLIC BLOOD PRESSURE: 90 MMHG

## 2020-08-29 RX ORDER — PSEUDOEPHEDRINE HCL 30 MG
100 TABLET ORAL 2 TIMES DAILY PRN
Qty: 60 CAPSULE | Refills: 0 | Status: SHIPPED | OUTPATIENT
Start: 2020-08-29 | End: 2020-10-19

## 2020-08-29 RX ORDER — LABETALOL 200 MG/1
200 TABLET, FILM COATED ORAL 2 TIMES DAILY
Status: DISCONTINUED | OUTPATIENT
Start: 2020-08-29 | End: 2020-08-29

## 2020-08-29 RX ORDER — LABETALOL 200 MG/1
200 TABLET, FILM COATED ORAL 2 TIMES DAILY
Qty: 60 TABLET | Refills: 1 | Status: SHIPPED | OUTPATIENT
Start: 2020-08-29 | End: 2020-10-19

## 2020-08-29 RX ORDER — IBUPROFEN 600 MG/1
600 TABLET ORAL EVERY 6 HOURS PRN
Qty: 60 TABLET | Refills: 0 | Status: SHIPPED | OUTPATIENT
Start: 2020-08-29 | End: 2020-10-19

## 2020-08-29 NOTE — PROGRESS NOTES
Notified  [facility] of repeat B/P. May discharge home. Follow up in 1 week for B/P check and then in 6 weeks for PP check.

## 2020-08-29 NOTE — PROGRESS NOTES
Patient up to bathroom with assist x 2. Voided 600. Patient transferred to mother/baby room 362 per wheelchair in stable condition with personal belongings. Accompanied by staff.  Baby taken to room via crib cart under bili lights, accompanied by RHONA Ferrara

## 2020-08-29 NOTE — PLAN OF CARE
Problem: Patient Centered Care  Goal: Patient preferences are identified and integrated in the patient's plan of care  Description  Interventions:  - What would you like us to know as we care for you?   - Provide timely, complete, and accurate informatio mother/family.  - Identify cultural beliefs/practices regarding lactation, letdown techniques, maternal food preferences.   - Assess mother's knowledge and previous experience with breast feeding.  - Provide information as needed about early infant feeding measures. 2020 1446 by Aydee Chapman RN  Outcome: Completed  2020 1445 by Aydee Chapman RN  Outcome: Progressing  Goal: Appropriate maternal -  bonding  Description  INTERVENTIONS:  - Assess caregiver- interactions.   - Assess c as needed. - Utilize standard precautions and use personal protective equipment as indicated. Ensure aseptic care of all intravenous lines and invasive tubes/drains.  - Obtain immunization and exposure to communicable diseases history.   8/29/2020 1446 by RN  Outcome: Progressing  Goal: Establishment of adequate milk supply with medication/procedure interruptions  Description  INTERVENTIONS:  - Review techniques for milk expression (breast pumping).    - Provide pumping equipment/supplies, instructions, and

## 2020-08-29 NOTE — PLAN OF CARE
Problem: Patient Centered Care  Goal: Patient preferences are identified and integrated in the patient's plan of care  Description  Interventions:  - What would you like us to know as we care for you?   - Provide timely, complete, and accurate informatio information as needed about early infant feeding cues (e.g., rooting, lip smacking, sucking fingers/hand) versus late cue of crying.  - Discuss/demonstrate breast feeding aids (e.g., infant sling, nursing footstool/pillows, and breast pumps).   - Encourage Progressing

## 2020-08-29 NOTE — PLAN OF CARE
Problem: Patient Centered Care  Goal: Patient preferences are identified and integrated in the patient's plan of care  Description  Interventions:  - What would you like us to know as we care for you?   - Provide timely, complete, and accurate informatio Assess bladder function and monitor for bladder distention.  - Provide/instruct/assist with pericare as needed. - Provide VTE prophylaxis as needed. - Monitor bowel function.  - Encourage ambulation and provide assistance as needed.   - Assess and monitor for and manage engorgement. - Provide breast feeding education handouts and information on community breast feeding support.    Outcome: Progressing  Goal: Establishment of adequate milk supply with medication/procedure interruptions  Description  INTERVEN

## 2020-08-29 NOTE — PLAN OF CARE
Continue current plan of care. Problem: POSTPARTUM  Goal: Long Term Goal:Experiences normal postpartum course  Description  INTERVENTIONS:  - Assess and monitor vital signs and lab values. - Assess fundus and lochia.   - Provide ice/sitz baths for perin monitor for signs of nipple pain/trauma. - Instruct and provide assistance with proper latch. - Review techniques for milk expression (breast pumping) and storage of breast milk. Provide pumping equipment/supplies, instructions and assistance, as needed.

## 2020-08-29 NOTE — PROGRESS NOTES
Post-Partum Note   8/29/2020, 7:19 AM    Subjective:  Patient doing well. Pain is well controlled. She is ambulating without lightheadedness or dizziness. Denies fevers or chills. Denies SOB, CP. She had a slight headache that is better with Ibuprofen.  She

## 2020-09-08 ENCOUNTER — TELEPHONE (OUTPATIENT)
Dept: OBGYN UNIT | Facility: HOSPITAL | Age: 30
End: 2020-09-08

## 2020-09-10 ENCOUNTER — NURSE ONLY (OUTPATIENT)
Dept: OBGYN CLINIC | Facility: CLINIC | Age: 30
End: 2020-09-10
Payer: MEDICAID

## 2020-09-10 VITALS — HEART RATE: 65 BPM | DIASTOLIC BLOOD PRESSURE: 83 MMHG | SYSTOLIC BLOOD PRESSURE: 123 MMHG

## 2020-09-10 DIAGNOSIS — Z01.30 BLOOD PRESSURE CHECK: Primary | ICD-10-CM

## 2020-09-10 PROCEDURE — 99211 OFF/OP EST MAY X REQ PHY/QHP: CPT | Performed by: OBSTETRICS & GYNECOLOGY

## 2020-09-10 PROCEDURE — 3079F DIAST BP 80-89 MM HG: CPT | Performed by: OBSTETRICS & GYNECOLOGY

## 2020-09-10 PROCEDURE — 3074F SYST BP LT 130 MM HG: CPT | Performed by: OBSTETRICS & GYNECOLOGY

## 2020-09-10 NOTE — PROGRESS NOTES
2 WK PP visit B/P check. Pt was instructed to take B/P at home, currently not doing so. Here at the clinic was 123/83. Per Nicole Alonzo pt to continue on meds and to take B/P 2x daily. Send in the results in 1 wk.  Reminded pt to call if has headache that doesn't re

## 2020-09-18 ENCOUNTER — TELEPHONE (OUTPATIENT)
Dept: OBGYN CLINIC | Facility: CLINIC | Age: 30
End: 2020-09-18

## 2020-10-09 ENCOUNTER — POSTPARTUM (OUTPATIENT)
Dept: OBGYN CLINIC | Facility: CLINIC | Age: 30
End: 2020-10-09
Payer: MEDICAID

## 2020-10-09 VITALS
DIASTOLIC BLOOD PRESSURE: 92 MMHG | SYSTOLIC BLOOD PRESSURE: 142 MMHG | HEART RATE: 86 BPM | BODY MASS INDEX: 25 KG/M2 | WEIGHT: 139 LBS

## 2020-10-09 DIAGNOSIS — O16.9 HYPERTENSION IN PREGNANCY, DELIVERED: ICD-10-CM

## 2020-10-09 PROBLEM — N76.0 RECURRENT VAGINITIS: Status: RESOLVED | Noted: 2018-06-25 | Resolved: 2020-10-09

## 2020-10-09 PROBLEM — O23.599 BACTERIAL VAGINOSIS IN PREGNANCY: Status: RESOLVED | Noted: 2020-06-26 | Resolved: 2020-10-09

## 2020-10-09 PROBLEM — B96.89 BACTERIAL VAGINOSIS IN PREGNANCY: Status: RESOLVED | Noted: 2020-06-26 | Resolved: 2020-10-09

## 2020-10-09 PROBLEM — B96.89 BACTERIAL VAGINOSIS IN PREGNANCY (HCC): Status: RESOLVED | Noted: 2020-06-26 | Resolved: 2020-10-09

## 2020-10-09 PROBLEM — O23.599 BACTERIAL VAGINOSIS IN PREGNANCY (HCC): Status: RESOLVED | Noted: 2020-06-26 | Resolved: 2020-10-09

## 2020-10-09 PROBLEM — Z30.2 REQUEST FOR STERILIZATION: Status: RESOLVED | Noted: 2020-07-12 | Resolved: 2020-10-09

## 2020-10-09 PROCEDURE — 3077F SYST BP >= 140 MM HG: CPT | Performed by: OBSTETRICS & GYNECOLOGY

## 2020-10-09 PROCEDURE — 3080F DIAST BP >= 90 MM HG: CPT | Performed by: OBSTETRICS & GYNECOLOGY

## 2020-10-09 PROCEDURE — 0503F POSTPARTUM CARE VISIT: CPT | Performed by: OBSTETRICS & GYNECOLOGY

## 2020-10-14 ENCOUNTER — TELEPHONE (OUTPATIENT)
Dept: OBGYN CLINIC | Facility: CLINIC | Age: 30
End: 2020-10-14

## 2020-10-14 NOTE — TELEPHONE ENCOUNTER
Pt would like to consult with JEANA about BC options. States she had PP with [facility] but pt would like to discuss further with JEANA. appt scheduled on 10/19/20.

## 2020-10-14 NOTE — TELEPHONE ENCOUNTER
Pt was seen for post partium and would like to start birth control did discuss with Dr Ribera Gentle

## 2020-10-15 PROBLEM — Z34.90 PREGNANCY (HCC): Status: RESOLVED | Noted: 2020-08-27 | Resolved: 2020-10-15

## 2020-10-15 PROBLEM — Z34.90 PREGNANCY: Status: RESOLVED | Noted: 2020-08-27 | Resolved: 2020-10-15

## 2020-10-15 PROBLEM — O14.90 PREECLAMPSIA (HCC): Status: RESOLVED | Noted: 2020-08-28 | Resolved: 2020-10-15

## 2020-10-15 PROBLEM — O14.90 PREECLAMPSIA: Status: RESOLVED | Noted: 2020-08-28 | Resolved: 2020-10-15

## 2020-10-15 NOTE — PROGRESS NOTES
Juan Lopez is a 27year old female  here for 6 week post-partum visit. Patient delivered a  female infant on 2020 via spontaneous vaginal delivery. Patient desires something for contraception. Patient is breast feeding.    Jessica Rfl: 0  •  ibuprofen 600 MG Oral Tab, Take 1 tablet (600 mg total) by mouth every 6 (six) hours as needed.  (Patient not taking: Reported on 10/9/2020 ), Disp: 60 tablet, Rfl: 0  •  Labetalol HCl 200 MG Oral Tab, Take 1 tablet (200 mg total) by mouth 2 (two

## 2020-10-19 ENCOUNTER — OFFICE VISIT (OUTPATIENT)
Dept: OBGYN CLINIC | Facility: CLINIC | Age: 30
End: 2020-10-19
Payer: MEDICAID

## 2020-10-19 VITALS
DIASTOLIC BLOOD PRESSURE: 97 MMHG | HEART RATE: 60 BPM | BODY MASS INDEX: 25 KG/M2 | SYSTOLIC BLOOD PRESSURE: 144 MMHG | WEIGHT: 139 LBS

## 2020-10-19 DIAGNOSIS — Z30.09 ENCOUNTER FOR GENERAL COUNSELING AND ADVICE ON CONTRACEPTIVE MANAGEMENT: Primary | ICD-10-CM

## 2020-10-19 PROCEDURE — 3080F DIAST BP >= 90 MM HG: CPT | Performed by: OBSTETRICS & GYNECOLOGY

## 2020-10-19 PROCEDURE — 99213 OFFICE O/P EST LOW 20 MIN: CPT | Performed by: OBSTETRICS & GYNECOLOGY

## 2020-10-19 PROCEDURE — 3077F SYST BP >= 140 MM HG: CPT | Performed by: OBSTETRICS & GYNECOLOGY

## 2020-10-19 RX ORDER — ACETAMINOPHEN AND CODEINE PHOSPHATE 120; 12 MG/5ML; MG/5ML
0.35 SOLUTION ORAL DAILY
Qty: 1 PACKAGE | Refills: 1 | Status: SHIPPED | OUTPATIENT
Start: 2020-10-19 | End: 2020-11-16

## 2020-10-19 RX ORDER — LABETALOL 200 MG/1
200 TABLET, FILM COATED ORAL 2 TIMES DAILY
COMMUNITY
End: 2021-03-01

## 2020-10-22 ENCOUNTER — TELEPHONE (OUTPATIENT)
Dept: OBGYN CLINIC | Facility: CLINIC | Age: 30
End: 2020-10-22

## 2020-10-22 ENCOUNTER — NURSE TRIAGE (OUTPATIENT)
Dept: FAMILY MEDICINE CLINIC | Facility: CLINIC | Age: 30
End: 2020-10-22

## 2020-10-22 ENCOUNTER — TELEPHONE (OUTPATIENT)
Dept: FAMILY MEDICINE CLINIC | Facility: CLINIC | Age: 30
End: 2020-10-22

## 2020-10-22 NOTE — TELEPHONE ENCOUNTER
On call service states patient calling in to cancel appt today at 5:45 pm with Dr. Jim Saunders. Cancelled appt.

## 2020-10-22 NOTE — TELEPHONE ENCOUNTER
Pt advised OLAMIDE does not feel Nexplanon is a good choice at this time. Pt states she did not take her birth control pill and already her headache is improving.   Advised to call and see PCP because of the headaches and HTN first.  Once evaluated can call us

## 2020-10-22 NOTE — TELEPHONE ENCOUNTER
Per pt just started [facility] Monday and states has been experiencing Migraines.  Please advise

## 2020-10-22 NOTE — TELEPHONE ENCOUNTER
Action Requested: Summary for Provider     []  Critical Lab, Recommendations Needed  [] Need Additional Advice  []   FYI    []   Need Orders  [] Need Medications Sent to Pharmacy  []  Other     SUMMARY: Patient reports for last 2 months has been with high

## 2020-10-22 NOTE — TELEPHONE ENCOUNTER
I will forward message to Dr Yuko Sullivan for recommendations as she saw her at delivery / PP and would be doing Nexplanon.

## 2020-10-22 NOTE — TELEPHONE ENCOUNTER
Pt calling to report she started the mini pill on Monday and she has been experiencing migraines since starting. Pt stated prior to her pregnancy she was on a different OCP that has estrogen in it and felt the same migraines.  Pt stated that she was plannin

## 2020-10-23 ENCOUNTER — OFFICE VISIT (OUTPATIENT)
Dept: FAMILY MEDICINE CLINIC | Facility: CLINIC | Age: 30
End: 2020-10-23
Payer: MEDICAID

## 2020-10-23 ENCOUNTER — LAB ENCOUNTER (OUTPATIENT)
Dept: LAB | Age: 30
End: 2020-10-23
Attending: STUDENT IN AN ORGANIZED HEALTH CARE EDUCATION/TRAINING PROGRAM
Payer: MEDICAID

## 2020-10-23 VITALS
BODY MASS INDEX: 25.21 KG/M2 | WEIGHT: 137 LBS | DIASTOLIC BLOOD PRESSURE: 107 MMHG | HEIGHT: 62 IN | SYSTOLIC BLOOD PRESSURE: 148 MMHG | TEMPERATURE: 98 F | HEART RATE: 80 BPM

## 2020-10-23 DIAGNOSIS — Z28.21 IMMUNIZATION REFUSED: ICD-10-CM

## 2020-10-23 DIAGNOSIS — G44.209 TENSION HEADACHE: ICD-10-CM

## 2020-10-23 PROCEDURE — 3080F DIAST BP >= 90 MM HG: CPT | Performed by: STUDENT IN AN ORGANIZED HEALTH CARE EDUCATION/TRAINING PROGRAM

## 2020-10-23 PROCEDURE — 80061 LIPID PANEL: CPT

## 2020-10-23 PROCEDURE — 3077F SYST BP >= 140 MM HG: CPT | Performed by: STUDENT IN AN ORGANIZED HEALTH CARE EDUCATION/TRAINING PROGRAM

## 2020-10-23 PROCEDURE — 3008F BODY MASS INDEX DOCD: CPT | Performed by: STUDENT IN AN ORGANIZED HEALTH CARE EDUCATION/TRAINING PROGRAM

## 2020-10-23 PROCEDURE — 99214 OFFICE O/P EST MOD 30 MIN: CPT | Performed by: STUDENT IN AN ORGANIZED HEALTH CARE EDUCATION/TRAINING PROGRAM

## 2020-10-23 PROCEDURE — 85025 COMPLETE CBC W/AUTO DIFF WBC: CPT

## 2020-10-23 PROCEDURE — 36415 COLL VENOUS BLD VENIPUNCTURE: CPT

## 2020-10-23 PROCEDURE — 80053 COMPREHEN METABOLIC PANEL: CPT

## 2020-10-23 PROCEDURE — 81001 URINALYSIS AUTO W/SCOPE: CPT

## 2020-10-23 RX ORDER — AMLODIPINE BESYLATE 5 MG/1
5 TABLET ORAL DAILY
Qty: 30 TABLET | Refills: 0 | Status: SHIPPED | OUTPATIENT
Start: 2020-10-23 | End: 2020-11-23

## 2020-10-23 RX ORDER — CYCLOBENZAPRINE HCL 5 MG
5 TABLET ORAL 3 TIMES DAILY PRN
Qty: 30 TABLET | Refills: 0 | Status: SHIPPED | OUTPATIENT
Start: 2020-10-23 | End: 2020-12-23

## 2020-10-23 NOTE — PROGRESS NOTES
HPI:    Patient ID: Doni Richardson is a 27year old female. HPI  Pt presenting with HTN and headaches. She is approx 2 months postpartum from  on . She presented to L&D r/o labor, found to have BP in severe range.  Completed Mag, was disc well-developed. HENT:      Head: Normocephalic and atraumatic. Right Ear: External ear normal.      Left Ear: External ear normal.   Eyes:      Extraocular Movements: Extraocular movements intact.       Conjunctiva/sclera: Conjunctivae normal.      P reevaluation  - amLODIPine Besylate 5 MG Oral Tab; Take 1 tablet (5 mg total) by mouth daily. Dispense: 30 tablet; Refill: 0  - COMP METABOLIC PANEL (14); Future  - LIPID PANEL; Future  - CBC WITH DIFFERENTIAL WITH PLATELET;  Future  - URINALYSIS, ROUTINE;

## 2020-10-26 ENCOUNTER — TELEPHONE (OUTPATIENT)
Dept: FAMILY MEDICINE CLINIC | Facility: CLINIC | Age: 30
End: 2020-10-26

## 2020-10-26 NOTE — TELEPHONE ENCOUNTER
Patient calling and states that Dr. Bobbi Shannon suppose too gave her a note for do not return to work  For regarding her headaches and blood pressure       Please put in mychart for patient       Please advise   [phone]

## 2020-10-26 NOTE — PROGRESS NOTES
HPI:    Patient ID: Darío Yip is a 27year old female. HPI  [id number] with amenorrhea post  with [facility] and breast feeding. She saw Dr Dr Margy Alxeander for [facility] visit and discussed [facility]. After discussion, they had decided on Nexplanon.  After considering t and well-nourished. No distress.               ASSESSMENT/PLAN:   Encounter for general counseling and advice on contraceptive management  (primary encounter diagnosis)  After discussion on options including OCP, ring, DMPA, IUD and tubal sterilization, she

## 2020-10-27 NOTE — TELEPHONE ENCOUNTER
Patient is requesting for her work note to be fax over to her job     Fax # [phone]    Please add her short term disability clamin number # [id number]

## 2020-10-28 NOTE — TELEPHONE ENCOUNTER
Left VM re: BP and symptoms. Would like to discuss current symptoms to r/o need for urgent evaluation. Plan to start Lasix 20mg following discussion.

## 2020-10-28 NOTE — TELEPHONE ENCOUNTER
Spoke with pt re: BP. She states that after taking AM Amlodipine, BP values came down to 120-130s SBP. Pt will continue current dosing, will phone call follow up later this week to discuss progress. Discussed red flags for urgent evaluation. Pt verbalized understanding and agrees with plan.

## 2020-10-29 NOTE — TELEPHONE ENCOUNTER
Called Pt to inform that work excuse letter was faxed successfully. Fax # [phone]. Patient will contact her employer to get a hard copy of Short term disability form. Pt will fax it over  or drop it off at the [facility].  Pt verbalized understand

## 2020-10-30 ENCOUNTER — TELEPHONE (OUTPATIENT)
Dept: FAMILY MEDICINE CLINIC | Facility: CLINIC | Age: 30
End: 2020-10-30

## 2020-11-02 NOTE — TELEPHONE ENCOUNTER
disab questionnaire received in forms. Logged for processing and sent [employer] message for missing hipaa.

## 2020-11-12 ENCOUNTER — OFFICE VISIT (OUTPATIENT)
Dept: FAMILY MEDICINE CLINIC | Facility: CLINIC | Age: 30
End: 2020-11-12
Payer: MEDICAID

## 2020-11-12 ENCOUNTER — LAB ENCOUNTER (OUTPATIENT)
Dept: LAB | Age: 30
End: 2020-11-12
Attending: STUDENT IN AN ORGANIZED HEALTH CARE EDUCATION/TRAINING PROGRAM
Payer: MEDICAID

## 2020-11-12 VITALS
BODY MASS INDEX: 25.21 KG/M2 | SYSTOLIC BLOOD PRESSURE: 129 MMHG | DIASTOLIC BLOOD PRESSURE: 81 MMHG | HEIGHT: 62 IN | WEIGHT: 137 LBS | TEMPERATURE: 98 F | HEART RATE: 89 BPM

## 2020-11-12 DIAGNOSIS — I10 HYPERTENSION, UNSPECIFIED TYPE: Primary | ICD-10-CM

## 2020-11-12 DIAGNOSIS — Z23 IMMUNIZATION DUE: ICD-10-CM

## 2020-11-12 DIAGNOSIS — R53.83 FATIGUE, UNSPECIFIED TYPE: ICD-10-CM

## 2020-11-12 PROCEDURE — 84443 ASSAY THYROID STIM HORMONE: CPT

## 2020-11-12 PROCEDURE — 90686 IIV4 VACC NO PRSV 0.5 ML IM: CPT | Performed by: STUDENT IN AN ORGANIZED HEALTH CARE EDUCATION/TRAINING PROGRAM

## 2020-11-12 PROCEDURE — 82607 VITAMIN B-12: CPT

## 2020-11-12 PROCEDURE — 84466 ASSAY OF TRANSFERRIN: CPT

## 2020-11-12 PROCEDURE — 3074F SYST BP LT 130 MM HG: CPT | Performed by: STUDENT IN AN ORGANIZED HEALTH CARE EDUCATION/TRAINING PROGRAM

## 2020-11-12 PROCEDURE — 36415 COLL VENOUS BLD VENIPUNCTURE: CPT

## 2020-11-12 PROCEDURE — 90471 IMMUNIZATION ADMIN: CPT | Performed by: STUDENT IN AN ORGANIZED HEALTH CARE EDUCATION/TRAINING PROGRAM

## 2020-11-12 PROCEDURE — 82728 ASSAY OF FERRITIN: CPT

## 2020-11-12 PROCEDURE — 3008F BODY MASS INDEX DOCD: CPT | Performed by: STUDENT IN AN ORGANIZED HEALTH CARE EDUCATION/TRAINING PROGRAM

## 2020-11-12 PROCEDURE — 3079F DIAST BP 80-89 MM HG: CPT | Performed by: STUDENT IN AN ORGANIZED HEALTH CARE EDUCATION/TRAINING PROGRAM

## 2020-11-12 PROCEDURE — 82306 VITAMIN D 25 HYDROXY: CPT

## 2020-11-12 PROCEDURE — 99214 OFFICE O/P EST MOD 30 MIN: CPT | Performed by: STUDENT IN AN ORGANIZED HEALTH CARE EDUCATION/TRAINING PROGRAM

## 2020-11-12 PROCEDURE — 83540 ASSAY OF IRON: CPT

## 2020-11-12 RX ORDER — HYDROCHLOROTHIAZIDE 12.5 MG/1
12.5 TABLET ORAL DAILY
Qty: 30 TABLET | Refills: 0 | Status: SHIPPED | OUTPATIENT
Start: 2020-11-12 | End: 2021-01-02

## 2020-11-13 ENCOUNTER — TELEPHONE (OUTPATIENT)
Dept: FAMILY MEDICINE CLINIC | Facility: CLINIC | Age: 30
End: 2020-11-13

## 2020-11-13 NOTE — TELEPHONE ENCOUNTER
WILFRED form signed by patient. Successfuly faxed to foroms dept. Fax # [phone]. Confirmation number placed in scanning. Originals placed in Tierra's inbox. Copies made and placed in scanning. 0 = swallows foods/liquids without difficulty

## 2020-11-13 NOTE — TELEPHONE ENCOUNTER
Dr. Bert Farnsworth,       Please sign off on form:  -Highlight the patient and hit \"Chart\" button.   -In Chart Review, w/in the Encounter tab - click 1 time on the Telephone call encounter for 10/30/20Scroll down the telephone encounter.  -Click \"scan on\" blue

## 2020-11-13 NOTE — PROGRESS NOTES
HPI:    Patient ID: Sara Castorena is a 27year old female. HPI   Pt presenting for HTN follow-up. She has been checking her BP at home, reports -150s. She complains of intermittent headache and fatigue, triggered by stress.  She states th and atraumatic. Right Ear: External ear normal.      Left Ear: External ear normal.   Eyes:      Conjunctiva/sclera: Conjunctivae normal.   Neck:      Musculoskeletal: Normal range of motion and neck supple. No muscular tenderness.       Thyroid: No th Immunization due  Influenza vaccine given today  - FLULAVAL INFLUENZA VACCINE QUAD PRESERVATIVE FREE 0.5 ML    RTC in 2-4 weeks for surveillance, consider medication adjustment. Pt verbalized understanding and agrees with plan.     Orders Placed This Encoun

## 2020-11-16 RX ORDER — LABETALOL 200 MG/1
TABLET, FILM COATED ORAL
Qty: 60 TABLET | Refills: 0 | OUTPATIENT
Start: 2020-11-16

## 2020-11-23 ENCOUNTER — OFFICE VISIT (OUTPATIENT)
Dept: FAMILY MEDICINE CLINIC | Facility: CLINIC | Age: 30
End: 2020-11-23
Payer: MEDICAID

## 2020-11-23 VITALS
RESPIRATION RATE: 16 BRPM | WEIGHT: 137 LBS | HEART RATE: 79 BPM | SYSTOLIC BLOOD PRESSURE: 129 MMHG | BODY MASS INDEX: 25.21 KG/M2 | DIASTOLIC BLOOD PRESSURE: 88 MMHG | HEIGHT: 62 IN

## 2020-11-23 DIAGNOSIS — N89.8 VAGINAL DISCHARGE: Primary | ICD-10-CM

## 2020-11-23 PROCEDURE — 99213 OFFICE O/P EST LOW 20 MIN: CPT | Performed by: PHYSICIAN ASSISTANT

## 2020-11-23 PROCEDURE — 3008F BODY MASS INDEX DOCD: CPT | Performed by: PHYSICIAN ASSISTANT

## 2020-11-23 PROCEDURE — 3074F SYST BP LT 130 MM HG: CPT | Performed by: PHYSICIAN ASSISTANT

## 2020-11-23 PROCEDURE — 3079F DIAST BP 80-89 MM HG: CPT | Performed by: PHYSICIAN ASSISTANT

## 2020-11-23 NOTE — PROGRESS NOTES
HPI:    Patient ID: Shama Mckeon is a 27year old female. Patient presents for vaginal irritation and discharge for the past 4 days. Discharge is yellow-white in color. Does have fishy odor. No vaginal pain. No fever/chills. No pelvic pain.  No motion. Neck supple. Cardiovascular: Normal rate, regular rhythm and normal heart sounds. Pulmonary/Chest: Effort normal and breath sounds normal. She has no wheezes. She has no rales. Abdominal: Soft.  Bowel sounds are normal. She exhibits no disten

## 2020-11-24 RX ORDER — METRONIDAZOLE 7.5 MG/G
1 GEL VAGINAL NIGHTLY
Qty: 1 TUBE | Refills: 0 | Status: SHIPPED | OUTPATIENT
Start: 2020-11-24 | End: 2020-11-29

## 2020-11-24 RX ORDER — METRONIDAZOLE 500 MG/1
500 TABLET ORAL 2 TIMES DAILY
Qty: 14 TABLET | Refills: 0 | Status: SHIPPED | OUTPATIENT
Start: 2020-11-24 | End: 2020-12-01

## 2020-11-25 ENCOUNTER — OFFICE VISIT (OUTPATIENT)
Dept: FAMILY MEDICINE CLINIC | Facility: CLINIC | Age: 30
End: 2020-11-25
Payer: MEDICAID

## 2020-11-25 VITALS
TEMPERATURE: 98 F | WEIGHT: 137 LBS | HEIGHT: 62 IN | SYSTOLIC BLOOD PRESSURE: 129 MMHG | BODY MASS INDEX: 25.21 KG/M2 | HEART RATE: 98 BPM | DIASTOLIC BLOOD PRESSURE: 92 MMHG

## 2020-11-25 DIAGNOSIS — F41.8 DEPRESSION WITH ANXIETY: ICD-10-CM

## 2020-11-25 DIAGNOSIS — I10 HYPERTENSION, UNSPECIFIED TYPE: Primary | ICD-10-CM

## 2020-11-25 DIAGNOSIS — R51.9 NONINTRACTABLE HEADACHE, UNSPECIFIED CHRONICITY PATTERN, UNSPECIFIED HEADACHE TYPE: ICD-10-CM

## 2020-11-25 PROCEDURE — 3074F SYST BP LT 130 MM HG: CPT | Performed by: STUDENT IN AN ORGANIZED HEALTH CARE EDUCATION/TRAINING PROGRAM

## 2020-11-25 PROCEDURE — 3080F DIAST BP >= 90 MM HG: CPT | Performed by: STUDENT IN AN ORGANIZED HEALTH CARE EDUCATION/TRAINING PROGRAM

## 2020-11-25 PROCEDURE — 99214 OFFICE O/P EST MOD 30 MIN: CPT | Performed by: STUDENT IN AN ORGANIZED HEALTH CARE EDUCATION/TRAINING PROGRAM

## 2020-11-25 PROCEDURE — 3008F BODY MASS INDEX DOCD: CPT | Performed by: STUDENT IN AN ORGANIZED HEALTH CARE EDUCATION/TRAINING PROGRAM

## 2020-11-25 RX ORDER — CLONIDINE HYDROCHLORIDE 0.1 MG/1
0.1 TABLET ORAL 2 TIMES DAILY PRN
Qty: 60 TABLET | Refills: 0 | Status: SHIPPED | OUTPATIENT
Start: 2020-11-25 | End: 2021-01-04

## 2020-11-25 RX ORDER — AMLODIPINE BESYLATE 5 MG/1
TABLET ORAL
Qty: 90 TABLET | Refills: 1 | Status: SHIPPED
Start: 2020-11-25 | End: 2021-03-01

## 2020-11-25 RX ORDER — SERTRALINE HYDROCHLORIDE 25 MG/1
25 TABLET, FILM COATED ORAL DAILY
Qty: 30 TABLET | Refills: 0 | Status: SHIPPED | OUTPATIENT
Start: 2020-11-25 | End: 2021-01-04

## 2020-11-25 NOTE — PROGRESS NOTES
HPI:    Patient ID: Jr Coffey is a 27year old female. HPI  Pt presenting for anxiety follow-up. She reports increased stress due to her fiance returning after a prolonged absence without warning.  She is in the process of moving to her moth mouth daily. • AMLODIPINE BESYLATE 5 MG Oral Tab TAKE 1 TABLET(5 MG) BY MOUTH DAILY 90 tablet 1   • cyclobenzaprine 5 MG Oral Tab Take 1 tablet (5 mg total) by mouth 3 (three) times daily as needed for Muscle spasms.  30 tablet 0     Allergies:  Zithrom headaches  - medications reviewed for breastfeeding safety  - advised to call if BP is persistently elevated  - cloNIDine HCl 0.1 MG Oral Tab; Take 1 tablet (0.1 mg total) by mouth 2 (two) times daily as needed (headaches, elevated BP).   Dispense: 60 table

## 2020-11-27 ENCOUNTER — TELEPHONE (OUTPATIENT)
Dept: FAMILY MEDICINE CLINIC | Facility: CLINIC | Age: 30
End: 2020-11-27

## 2020-11-27 NOTE — TELEPHONE ENCOUNTER
Two letters created by Dr Charmayne Gutting successfully faxed to [facility] per pt request. ( Claim # [id number]) . Confirmation number placed in scanning.

## 2020-12-03 ENCOUNTER — TELEPHONE (OUTPATIENT)
Dept: FAMILY MEDICINE CLINIC | Facility: CLINIC | Age: 30
End: 2020-12-03

## 2020-12-03 NOTE — TELEPHONE ENCOUNTER
Printed out OV notes from 10/23/2020, 11/12/2020, 11/25/2020 successfully faxed to Prak[facility] per pt request. Fax # [phone]. Confirmation number placed in scanning. Have not received a fax yet from [facility] for dr rebeca. Called Pt to notify.

## 2020-12-07 ENCOUNTER — TELEPHONE (OUTPATIENT)
Dept: FAMILY MEDICINE CLINIC | Facility: CLINIC | Age: 30
End: 2020-12-07

## 2020-12-07 NOTE — TELEPHONE ENCOUNTER
Cont from 10/30/20 forms tyshawn. Has mychart consent. [facility] disab forms logged.

## 2020-12-07 NOTE — TELEPHONE ENCOUNTER
Received fax from [facility]. Short Disability Forms. Emailed forms to [employer] [employer]. Kelvin Keenan placed in Tierra's in box. Made copies, placed in scanning.

## 2020-12-15 NOTE — TELEPHONE ENCOUNTER
Dr. Reginald Birch,     Please sign off on form: Disability   -Highlight the patient and hit \"Chart\" button.   -In Chart Review, w/in the Encounter tab - click 1 time on the Telephone call encounter for 12/7/20 Scroll down the telephone encounter.  -Click Popeye Hitchcock

## 2020-12-16 ENCOUNTER — TELEPHONE (OUTPATIENT)
Dept: FAMILY MEDICINE CLINIC | Facility: CLINIC | Age: 30
End: 2020-12-16

## 2020-12-16 NOTE — TELEPHONE ENCOUNTER
Pt is approx 3.5mo postpartum after delivery complicated by preeclampsia. She has had difficulty with adequate blood pressure control since delivery, and reports moderate headaches and dizziness with elevated blood pressure readings.  We have been adjusting

## 2020-12-16 NOTE — TELEPHONE ENCOUNTER
Pt states [employer] is indicating form filled out by MD is not specific enough to why she needs to be on disability. Per pt Dr Harjeet Dumas needs to add more supportive information on why pt can't work.     Please advise

## 2020-12-16 NOTE — TELEPHONE ENCOUNTER
Pt asking if she needs to wait until 12/23/20 to start BCP or can start them now.      Please advise

## 2020-12-17 NOTE — TELEPHONE ENCOUNTER
Disab completed w/ Dr Cheli Loredo added notes and faxed to [facility].  Pt is aware

## 2020-12-17 NOTE — TELEPHONE ENCOUNTER
Spoke with patient, (  verified ) she does not want to take BCP at all, she would like  to discuss IUD and other options     BCP give her migraine headaches, she is currently breast feeding     Has appt next week, would like to discuss this then     [facility]

## 2020-12-23 ENCOUNTER — OFFICE VISIT (OUTPATIENT)
Dept: FAMILY MEDICINE CLINIC | Facility: CLINIC | Age: 30
End: 2020-12-23
Payer: MEDICAID

## 2020-12-23 VITALS
HEART RATE: 71 BPM | HEIGHT: 62 IN | DIASTOLIC BLOOD PRESSURE: 76 MMHG | BODY MASS INDEX: 24.66 KG/M2 | SYSTOLIC BLOOD PRESSURE: 122 MMHG | WEIGHT: 134 LBS

## 2020-12-23 DIAGNOSIS — F41.8 DEPRESSION WITH ANXIETY: ICD-10-CM

## 2020-12-23 DIAGNOSIS — Z30.09 ENCOUNTER FOR COUNSELING REGARDING CONTRACEPTION: ICD-10-CM

## 2020-12-23 DIAGNOSIS — I10 HYPERTENSION, UNSPECIFIED TYPE: Primary | ICD-10-CM

## 2020-12-23 DIAGNOSIS — G43.829 MENSTRUAL MIGRAINE WITHOUT STATUS MIGRAINOSUS, NOT INTRACTABLE: ICD-10-CM

## 2020-12-23 PROCEDURE — 3074F SYST BP LT 130 MM HG: CPT | Performed by: STUDENT IN AN ORGANIZED HEALTH CARE EDUCATION/TRAINING PROGRAM

## 2020-12-23 PROCEDURE — 3078F DIAST BP <80 MM HG: CPT | Performed by: STUDENT IN AN ORGANIZED HEALTH CARE EDUCATION/TRAINING PROGRAM

## 2020-12-23 PROCEDURE — 99214 OFFICE O/P EST MOD 30 MIN: CPT | Performed by: STUDENT IN AN ORGANIZED HEALTH CARE EDUCATION/TRAINING PROGRAM

## 2020-12-23 PROCEDURE — 3008F BODY MASS INDEX DOCD: CPT | Performed by: STUDENT IN AN ORGANIZED HEALTH CARE EDUCATION/TRAINING PROGRAM

## 2020-12-23 RX ORDER — IBUPROFEN 600 MG/1
600 TABLET ORAL EVERY 6 HOURS PRN
Qty: 30 TABLET | Refills: 1 | Status: SHIPPED | OUTPATIENT
Start: 2020-12-23 | End: 2021-01-25

## 2020-12-23 NOTE — PROGRESS NOTES
HPI:    Patient ID: Isaias Beauchamp is a 27year old female. HPI  Pt presenting for HTN follow-up. She has been taking Amlodipine 5mg, HCTZ 12.5mg daily. She reports improvement in home BP readings, with highest recorded 132/86.  She reports less mouth daily.        Allergies:  Zithromax [Azithrom*    SWELLING  Aloe Vera               HIVES    12/23/20  1130 12/23/20  1138   BP: (!) 136/96 122/76   Pulse: 71    Weight: 134 lb (60.8 kg)    Height: 5' 2\" (1.575 m)        Body mass index is 24.51 kg/m with anxiety  Stable, denies SI/HI  - continue daily Sertraline    3. Encounter for counseling regarding contraception  Pt counseled on contraception options, including implant, IUD options (Mirena and Paragard), Depo shot, patch, OCPs.  Risks, benefits, in

## 2020-12-28 ENCOUNTER — OFFICE VISIT (OUTPATIENT)
Dept: FAMILY MEDICINE CLINIC | Facility: CLINIC | Age: 30
End: 2020-12-28
Payer: MEDICAID

## 2020-12-28 VITALS
TEMPERATURE: 97 F | HEART RATE: 87 BPM | BODY MASS INDEX: 24.66 KG/M2 | SYSTOLIC BLOOD PRESSURE: 151 MMHG | HEIGHT: 62 IN | DIASTOLIC BLOOD PRESSURE: 94 MMHG | WEIGHT: 134 LBS

## 2020-12-28 DIAGNOSIS — Z30.430 ENCOUNTER FOR INSERTION OF PARAGARD IUD: Primary | ICD-10-CM

## 2020-12-28 PROCEDURE — 3080F DIAST BP >= 90 MM HG: CPT | Performed by: STUDENT IN AN ORGANIZED HEALTH CARE EDUCATION/TRAINING PROGRAM

## 2020-12-28 PROCEDURE — 3077F SYST BP >= 140 MM HG: CPT | Performed by: STUDENT IN AN ORGANIZED HEALTH CARE EDUCATION/TRAINING PROGRAM

## 2020-12-28 PROCEDURE — 81025 URINE PREGNANCY TEST: CPT | Performed by: STUDENT IN AN ORGANIZED HEALTH CARE EDUCATION/TRAINING PROGRAM

## 2020-12-28 PROCEDURE — 58300 INSERT INTRAUTERINE DEVICE: CPT | Performed by: STUDENT IN AN ORGANIZED HEALTH CARE EDUCATION/TRAINING PROGRAM

## 2020-12-28 PROCEDURE — 3008F BODY MASS INDEX DOCD: CPT | Performed by: STUDENT IN AN ORGANIZED HEALTH CARE EDUCATION/TRAINING PROGRAM

## 2020-12-28 RX ORDER — COPPER 313.4 MG/1
1 INTRAUTERINE DEVICE INTRAUTERINE ONCE
Status: COMPLETED | OUTPATIENT
Start: 2020-12-28 | End: 2020-12-28

## 2020-12-28 RX ADMIN — COPPER 1 DEVICE: 313.4 INTRAUTERINE DEVICE INTRAUTERINE at 13:12:00

## 2020-12-28 NOTE — PROGRESS NOTES
PROCEDURE NOTE    12/28/2020 @ 1140    Indication: Patient desires long-term, reversible contraception.   Pre-Procedure Diagnosis: Paragard Insertion  Post-Procedure Diagnosis: Same  Performing Physician: Mel Umanzor MD  Informed consent: Procedure, Anand Galeana

## 2021-01-02 DIAGNOSIS — I10 HYPERTENSION, UNSPECIFIED TYPE: ICD-10-CM

## 2021-01-02 DIAGNOSIS — E55.9 VITAMIN D DEFICIENCY: ICD-10-CM

## 2021-01-02 DIAGNOSIS — F41.8 DEPRESSION WITH ANXIETY: ICD-10-CM

## 2021-01-02 RX ORDER — ERGOCALCIFEROL 1.25 MG/1
50000 CAPSULE ORAL WEEKLY
Qty: 12 CAPSULE | Refills: 0 | Status: SHIPPED | OUTPATIENT
Start: 2021-01-02 | End: 2021-03-01

## 2021-01-02 RX ORDER — HYDROCHLOROTHIAZIDE 12.5 MG/1
12.5 TABLET ORAL DAILY
Qty: 30 TABLET | Refills: 0 | Status: SHIPPED | OUTPATIENT
Start: 2021-01-02 | End: 2021-03-03

## 2021-01-02 NOTE — TELEPHONE ENCOUNTER
Patient is requesting 2 refills. Current Outpatient Medications   Medication Sig Dispense Refill   • ergocalciferol 1.25 MG (77764 UT) Oral Cap Take 1 capsule (50,000 Units total) by mouth once a week.  12 capsule 0   • hydrochlorothiazide 12.5 MG Oral T

## 2021-01-02 NOTE — TELEPHONE ENCOUNTER
•  cloNIDine HCl 0.1 MG Oral Tab, Take 1 tablet (0.1 mg total) by mouth 2 (two) times daily as needed (headaches, elevated BP). , Disp: 60 tablet, Rfl: 0  •  Sertraline HCl 25 MG Oral Tab, Take 1 tablet (25 mg total) by mouth daily. , Disp: 30 tablet, Rf

## 2021-01-04 RX ORDER — CLONIDINE HYDROCHLORIDE 0.1 MG/1
0.1 TABLET ORAL 2 TIMES DAILY PRN
Qty: 180 TABLET | Refills: 0 | Status: SHIPPED | OUTPATIENT
Start: 2021-01-04 | End: 2021-03-01

## 2021-01-04 RX ORDER — SERTRALINE HYDROCHLORIDE 25 MG/1
25 TABLET, FILM COATED ORAL DAILY
Qty: 90 TABLET | Refills: 1 | Status: SHIPPED | OUTPATIENT
Start: 2021-01-04

## 2021-01-19 ENCOUNTER — TELEPHONE (OUTPATIENT)
Dept: FAMILY MEDICINE CLINIC | Facility: CLINIC | Age: 31
End: 2021-01-19

## 2021-01-25 DIAGNOSIS — G43.829 MENSTRUAL MIGRAINE WITHOUT STATUS MIGRAINOSUS, NOT INTRACTABLE: ICD-10-CM

## 2021-01-27 RX ORDER — IBUPROFEN 600 MG/1
600 TABLET ORAL EVERY 6 HOURS PRN
Qty: 30 TABLET | Refills: 1 | Status: SHIPPED | OUTPATIENT
Start: 2021-01-27 | End: 2021-03-01

## 2021-01-28 ENCOUNTER — OFFICE VISIT (OUTPATIENT)
Dept: FAMILY MEDICINE CLINIC | Facility: CLINIC | Age: 31
End: 2021-01-28
Payer: MEDICAID

## 2021-01-28 VITALS
DIASTOLIC BLOOD PRESSURE: 75 MMHG | WEIGHT: 142 LBS | BODY MASS INDEX: 26.13 KG/M2 | TEMPERATURE: 98 F | SYSTOLIC BLOOD PRESSURE: 121 MMHG | HEART RATE: 81 BPM | HEIGHT: 62 IN

## 2021-01-28 DIAGNOSIS — I10 HYPERTENSION, UNSPECIFIED TYPE: ICD-10-CM

## 2021-01-28 DIAGNOSIS — Z30.431 IUD CHECK UP: Primary | ICD-10-CM

## 2021-01-28 DIAGNOSIS — R22.0 FACIAL SWELLING: ICD-10-CM

## 2021-01-28 DIAGNOSIS — R51.9 RECURRENT HEADACHE: ICD-10-CM

## 2021-01-28 PROCEDURE — 99214 OFFICE O/P EST MOD 30 MIN: CPT | Performed by: STUDENT IN AN ORGANIZED HEALTH CARE EDUCATION/TRAINING PROGRAM

## 2021-01-28 PROCEDURE — 3008F BODY MASS INDEX DOCD: CPT | Performed by: STUDENT IN AN ORGANIZED HEALTH CARE EDUCATION/TRAINING PROGRAM

## 2021-01-28 PROCEDURE — 3078F DIAST BP <80 MM HG: CPT | Performed by: STUDENT IN AN ORGANIZED HEALTH CARE EDUCATION/TRAINING PROGRAM

## 2021-01-28 PROCEDURE — 3074F SYST BP LT 130 MM HG: CPT | Performed by: STUDENT IN AN ORGANIZED HEALTH CARE EDUCATION/TRAINING PROGRAM

## 2021-01-29 NOTE — PROGRESS NOTES
HPI:    Patient ID: Nena Reid is a 27year old female. HPI  Pt presenting for IUD check. Paragard placed 12/28/20. She noted initial cramping for the first 2 weeks associated with her menses. Period due son.  She denies any issues with IUD, Reported on 1/28/2021 ) 30 tablet 0   • AMLODIPINE BESYLATE 5 MG Oral Tab TAKE 1 TABLET(5 MG) BY MOUTH DAILY (Patient not taking: Reported on 1/28/2021) 90 tablet 1   • Labetalol HCl 200 MG Oral Tab Take 200 mg by mouth 2 (two) times daily.        Allergies Attention and Perception: Attention normal.         Mood and Affect: Mood and affect normal.         Behavior: Behavior normal. Behavior is cooperative. Thought Content: Thought content does not include homicidal ideation.          Cognition a

## 2021-03-01 ENCOUNTER — OFFICE VISIT (OUTPATIENT)
Dept: OBGYN CLINIC | Facility: CLINIC | Age: 31
End: 2021-03-01
Payer: MEDICAID

## 2021-03-01 VITALS
WEIGHT: 143 LBS | SYSTOLIC BLOOD PRESSURE: 123 MMHG | DIASTOLIC BLOOD PRESSURE: 88 MMHG | HEART RATE: 74 BPM | BODY MASS INDEX: 26 KG/M2

## 2021-03-01 DIAGNOSIS — Z11.3 VENEREAL DISEASE SCREENING: ICD-10-CM

## 2021-03-01 DIAGNOSIS — Z01.419 ENCOUNTER FOR GYNECOLOGICAL EXAMINATION WITHOUT ABNORMAL FINDING: Primary | ICD-10-CM

## 2021-03-01 DIAGNOSIS — G43.829 MENSTRUAL MIGRAINE WITHOUT STATUS MIGRAINOSUS, NOT INTRACTABLE: ICD-10-CM

## 2021-03-01 DIAGNOSIS — N94.6 SEVERE DYSMENORRHEA: ICD-10-CM

## 2021-03-01 DIAGNOSIS — Z12.4 SCREENING FOR MALIGNANT NEOPLASM OF CERVIX: ICD-10-CM

## 2021-03-01 PROCEDURE — 99395 PREV VISIT EST AGE 18-39: CPT | Performed by: OBSTETRICS & GYNECOLOGY

## 2021-03-01 PROCEDURE — 3079F DIAST BP 80-89 MM HG: CPT | Performed by: OBSTETRICS & GYNECOLOGY

## 2021-03-01 PROCEDURE — 3074F SYST BP LT 130 MM HG: CPT | Performed by: OBSTETRICS & GYNECOLOGY

## 2021-03-01 RX ORDER — IBUPROFEN 600 MG/1
600 TABLET ORAL EVERY 6 HOURS PRN
Qty: 30 TABLET | Refills: 1 | Status: SHIPPED | OUTPATIENT
Start: 2021-03-01

## 2021-03-01 RX ORDER — AMLODIPINE BESYLATE 5 MG/1
5 TABLET ORAL DAILY
COMMUNITY

## 2021-03-01 RX ORDER — CLONIDINE HYDROCHLORIDE 0.1 MG/1
0.1 TABLET ORAL 2 TIMES DAILY
COMMUNITY
End: 2021-03-03

## 2021-03-01 RX ORDER — ERGOCALCIFEROL (VITAMIN D2) 1250 MCG
CAPSULE ORAL WEEKLY
COMMUNITY

## 2021-03-02 LAB
C TRACH DNA SPEC QL NAA+PROBE: NEGATIVE
HPV I/H RISK 1 DNA SPEC QL NAA+PROBE: NEGATIVE
N GONORRHOEA DNA SPEC QL NAA+PROBE: NEGATIVE

## 2021-03-02 NOTE — PROGRESS NOTES
HPI:    Patient ID: Sarah Cordoba is a 27year old female. HPI   with menses q 28d / 7-10d / normal flow but severe cramps. No new family or medical issues. Kids are well: [facility]- 10 y/o and daughter is 6 months. Still lactating.  Unfortu She appears well-developed and well-nourished. No distress. Neck: Neck supple. No thyromegaly present. Cardiovascular: Normal rate, regular rhythm and normal heart sounds. No murmur heard.   Pulmonary/Chest: Effort normal and breath sounds normal. She Oral Tab 30 tablet 1     Sig: Take 1 tablet (600 mg total) by mouth every 6 (six) hours as needed for Pain.        Imaging & Referrals:  None       VN#[id number]

## 2021-03-03 DIAGNOSIS — I10 HYPERTENSION, UNSPECIFIED TYPE: ICD-10-CM

## 2021-03-03 LAB — T VAGINALIS RRNA SPEC QL NAA+PROBE: NEGATIVE

## 2021-03-03 RX ORDER — CLONIDINE HYDROCHLORIDE 0.1 MG/1
0.1 TABLET ORAL 2 TIMES DAILY
Qty: 30 TABLET | Refills: 0 | Status: SHIPPED | OUTPATIENT
Start: 2021-03-03

## 2021-03-03 RX ORDER — HYDROCHLOROTHIAZIDE 12.5 MG/1
12.5 TABLET ORAL DAILY
Qty: 30 TABLET | Refills: 0 | Status: SHIPPED | OUTPATIENT
Start: 2021-03-03

## 2021-03-03 NOTE — TELEPHONE ENCOUNTER
•  cloNIDine HCl 0.1 MG Oral Tab, Take 0.1 mg by mouth 2 (two) times daily. , Disp: , Rfl:   •  hydrochlorothiazide 12.5 MG Oral Tab, Take 1 tablet (12.5 mg total) by mouth daily. , Disp: 30 tablet, Rfl: 0

## 2021-05-07 ENCOUNTER — HOSPITAL ENCOUNTER (EMERGENCY)
Facility: HOSPITAL | Age: 31
Discharge: HOME | End: 2021-05-07
Attending: EMERGENCY MEDICINE
Payer: MEDICAID

## 2021-05-07 ENCOUNTER — APPOINTMENT (OUTPATIENT)
Dept: CT IMAGING | Facility: HOSPITAL | Age: 31
End: 2021-05-07
Attending: EMERGENCY MEDICINE
Payer: MEDICAID

## 2021-05-07 ENCOUNTER — APPOINTMENT (OUTPATIENT)
Dept: GENERAL RADIOLOGY | Facility: HOSPITAL | Age: 31
End: 2021-05-07
Attending: EMERGENCY MEDICINE
Payer: MEDICAID

## 2021-05-07 VITALS
SYSTOLIC BLOOD PRESSURE: 132 MMHG | HEART RATE: 80 BPM | RESPIRATION RATE: 18 BRPM | BODY MASS INDEX: 27.6 KG/M2 | WEIGHT: 150 LBS | OXYGEN SATURATION: 100 % | DIASTOLIC BLOOD PRESSURE: 64 MMHG | HEIGHT: 62 IN | TEMPERATURE: 98 F

## 2021-05-07 DIAGNOSIS — S06.0X0A CONCUSSION WITHOUT LOSS OF CONSCIOUSNESS, INITIAL ENCOUNTER: ICD-10-CM

## 2021-05-07 DIAGNOSIS — T07.XXXA MULTIPLE CONTUSIONS: Primary | ICD-10-CM

## 2021-05-07 DIAGNOSIS — S09.90XA CLOSED HEAD INJURY, INITIAL ENCOUNTER: ICD-10-CM

## 2021-05-07 PROCEDURE — 73630 X-RAY EXAM OF FOOT: CPT | Performed by: EMERGENCY MEDICINE

## 2021-05-07 PROCEDURE — 73560 X-RAY EXAM OF KNEE 1 OR 2: CPT | Performed by: EMERGENCY MEDICINE

## 2021-05-07 PROCEDURE — 99284 EMERGENCY DEPT VISIT MOD MDM: CPT

## 2021-05-07 PROCEDURE — 70486 CT MAXILLOFACIAL W/O DYE: CPT | Performed by: EMERGENCY MEDICINE

## 2021-05-07 PROCEDURE — 73130 X-RAY EXAM OF HAND: CPT | Performed by: EMERGENCY MEDICINE

## 2021-05-07 PROCEDURE — 70450 CT HEAD/BRAIN W/O DYE: CPT | Performed by: EMERGENCY MEDICINE

## 2021-05-07 RX ORDER — ACETAMINOPHEN 500 MG
1000 TABLET ORAL ONCE
Status: COMPLETED | OUTPATIENT
Start: 2021-05-07 | End: 2021-05-07

## 2021-05-07 RX ORDER — IBUPROFEN 600 MG/1
600 TABLET ORAL EVERY 8 HOURS PRN
Qty: 15 TABLET | Refills: 0 | Status: SHIPPED | OUTPATIENT
Start: 2021-05-07 | End: 2021-05-12

## 2021-05-07 NOTE — ED INITIAL ASSESSMENT (HPI)
Pt presents to ED after falling out of her bed. Pt c/o left cheek, left forehead, left shoulder, left wrist, left hand, left knee and foot pain. Pt denies LOC. +vomiting after the fall. +dizziness. + swelling noted to the left cheek.

## 2021-05-07 NOTE — ED PROVIDER NOTES
Patient Seen in: [facility] AND [facility] Emergency Department      History   Patient presents with:  Fall    Stated Complaint: fell off bed    HPI/Subjective:   HPI    40-year-old healthy female followed bedside accidentally hit her left side of her body for t SpO2 100 %   O2 Device None (Room air)       Current:/70   Pulse 76   Temp 98 °F (36.7 °C) (Temporal)   Resp 18   Ht 157.5 cm (5' 2\")   Wt 68 kg   LMP 04/27/2021   SpO2 100%   BMI 27.44 kg/m²         Physical Exam    Constitutional: Oriented to pe evidence of acute bony or joint abnormality. X-RAY LEFT FOOT:  -No evidence of acute bony or joint abnormality. The results were faxed/finalized only at 0322 hrs ET.  If you would like to discuss this case directly please call [phone] (extens Discharge Medication List

## 2021-05-12 ENCOUNTER — OFFICE VISIT (OUTPATIENT)
Dept: FAMILY MEDICINE CLINIC | Facility: CLINIC | Age: 31
End: 2021-05-12
Payer: MEDICAID

## 2021-05-12 VITALS
TEMPERATURE: 98 F | SYSTOLIC BLOOD PRESSURE: 138 MMHG | HEIGHT: 62 IN | WEIGHT: 151 LBS | DIASTOLIC BLOOD PRESSURE: 94 MMHG | BODY MASS INDEX: 27.79 KG/M2 | HEART RATE: 103 BPM
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
## 2021-05-12 DIAGNOSIS — R42 DIZZINESS: ICD-10-CM

## 2021-05-12 PROCEDURE — 99213 OFFICE O/P EST LOW 20 MIN: CPT | Performed by: STUDENT IN AN ORGANIZED HEALTH CARE EDUCATION/TRAINING PROGRAM

## 2021-05-12 PROCEDURE — 3080F DIAST BP >= 90 MM HG: CPT | Performed by: STUDENT IN AN ORGANIZED HEALTH CARE EDUCATION/TRAINING PROGRAM

## 2021-05-12 PROCEDURE — 3008F BODY MASS INDEX DOCD: CPT | Performed by: STUDENT IN AN ORGANIZED HEALTH CARE EDUCATION/TRAINING PROGRAM

## 2021-05-12 PROCEDURE — 3075F SYST BP GE 130 - 139MM HG: CPT | Performed by: STUDENT IN AN ORGANIZED HEALTH CARE EDUCATION/TRAINING PROGRAM

## 2021-05-12 RX ORDER — TRAMADOL HYDROCHLORIDE 50 MG/1
50 TABLET ORAL EVERY 8 HOURS PRN
Qty: 30 TABLET | Refills: 1 | Status: SHIPPED | OUTPATIENT
Start: 2021-05-12

## 2021-05-12 RX ORDER — MECLIZINE HCL 12.5 MG/1
12.5 TABLET ORAL 3 TIMES DAILY PRN
Qty: 30 TABLET | Refills: 0 | Status: SHIPPED | OUTPATIENT
Start: 2021-05-12 | End: 2021-05-22

## 2021-05-12 RX ORDER — METHYLPREDNISOLONE 4 MG/1
TABLET ORAL
Qty: 1 KIT | Refills: 0 | Status: SHIPPED | OUTPATIENT
Start: 2021-05-12

## 2021-05-12 NOTE — PROGRESS NOTES
HPI:    Patient ID: Yeni Valentine is a 27year old female. HPI  Pt presenting for ER follow-up. She was seen in ER on 5/7 after falling out of bed and striking her face and Left side of body against dresser and onto floor.  Imaging [facility], [place] maxi Reported on 5/7/2021 )     • ibuprofen 600 MG Oral Tab Take 1 tablet (600 mg total) by mouth every 6 (six) hours as needed for Pain.  (Patient not taking: Reported on 5/7/2021 ) 30 tablet 1   • Sertraline HCl 25 MG Oral Tab Take 1 tablet (25 mg total) by mo Mood and Affect: Mood normal.         Behavior: Behavior normal. Behavior is cooperative. Cognition and Memory: Cognition normal.             ASSESSMENT/PLAN:   1.  Fall from bed, initial encounter  - increase hydration and rest as tolerated  - enco

## 2021-05-28 ENCOUNTER — TELEPHONE (OUTPATIENT)
Dept: FAMILY MEDICINE CLINIC | Facility: CLINIC | Age: 31
End: 2021-05-28

## 2021-05-28 DIAGNOSIS — Z01.84 IMMUNITY STATUS TESTING: Primary | ICD-10-CM

## 2021-05-28 NOTE — TELEPHONE ENCOUNTER
Dr Marilee Mehta pt is calling. She is switching jobs now. She needs updated Hep B. Please review and advise and put the order in if appropriate. Thank you. Possible blood test for titers.

## 2021-05-28 NOTE — TELEPHONE ENCOUNTER
LVM to inform pt that copy of immunization record is placed at the  for a . Pt to call back with any questions.

## 2021-05-28 NOTE — TELEPHONE ENCOUNTER
Per patient she would like a copy of all her immunizations records and would to  today 05/28/2021 @ [facility] she needs it for school and work purposes. Patient would like also to talk to nurse prior to  her copy.

## 2021-05-29 ENCOUNTER — LAB ENCOUNTER (OUTPATIENT)
Dept: LAB | Facility: HOSPITAL | Age: 31
End: 2021-05-29
Attending: STUDENT IN AN ORGANIZED HEALTH CARE EDUCATION/TRAINING PROGRAM
Payer: MEDICAID

## 2021-05-29 DIAGNOSIS — Z01.84 IMMUNITY STATUS TESTING: ICD-10-CM

## 2021-05-29 PROCEDURE — 86787 VARICELLA-ZOSTER ANTIBODY: CPT

## 2021-05-29 PROCEDURE — 86735 MUMPS ANTIBODY: CPT

## 2021-05-29 PROCEDURE — 86765 RUBEOLA ANTIBODY: CPT

## 2021-05-29 PROCEDURE — 86706 HEP B SURFACE ANTIBODY: CPT

## 2021-05-29 PROCEDURE — 86762 RUBELLA ANTIBODY: CPT

## 2021-05-29 PROCEDURE — 86480 TB TEST CELL IMMUN MEASURE: CPT

## 2021-05-29 PROCEDURE — 36415 COLL VENOUS BLD VENIPUNCTURE: CPT

## 2021-06-03 NOTE — PROGRESS NOTES
HPI:    Patient ID: Darío Yip is a 27year old female. HPI  Pt presenting with ongoing headaches. She reports recurrent Left frontal headaches associated with tinnitus since falling and striking the Left side of her face on 5/7/2021.  She de [Azithrom*    SWELLING  Aloe Vera               HIVES    06/03/21  1632   BP: 132/88   Pulse: 73   Temp: 98 °F (36.7 °C)   TempSrc: Temporal   Weight: 151 lb (68.5 kg)   Height: 5' 2\" (1.575 m)       Body mass index is 27.62 kg/m².    PHYSICAL EXAM:   Phys Therapy Pack; As directed. Dispense: 1 each; Refill: 0  - NEURO - INTERNAL    Pt verbalized understanding and agrees with plan. No orders of the defined types were placed in this encounter.       Meds This Visit:  Requested Prescriptions     Signed Pres

## 2021-06-09 NOTE — TELEPHONE ENCOUNTER
Pt called back,  Stated that she didn't have her phone by her when she was getting her son ready for school. She is now ready and has her phone. Will be ready for call back whenever doctor is able to call back. dialysis solution, , Dialysis, Continuous  heparin (porcine) injection 5,500 Units, 5,500 Units, Subcutaneous, PRN  pantoprazole (PROTONIX) injection 40 mg, 40 mg, Intravenous, Daily  fluconazole (DIFLUCAN) in 0.9 % sodium chloride IVPB 400 mg, 400 mg, Intravenous, Q24H  ampicillin-sulbactam (UNASYN) 3000 mg ivpb minibag, 3,000 mg, Intravenous, Q8H  insulin lispro (1 Unit Dial) 0-6 Units, 0-6 Units, Subcutaneous, Q4H  phenol 1.4 % mouth spray 1 spray, 1 spray, Mouth/Throat, Q2H PRN  [Held by provider] verapamil (CALAN) tablet 40 mg, 40 mg, Oral, 3 times per day  albuterol (PROVENTIL) nebulizer solution 2.5 mg, 2.5 mg, Nebulization, 4x daily  psyllium (HYDROCIL) 95 % packet 1 packet, 1 packet, Oral, BID  polyethylene glycol (GLYCOLAX) packet 17 g, 17 g, Oral, Daily  furosemide (LASIX) injection 40 mg, 40 mg, Intravenous, Once  phenylephrine (TAN-SYNEPHRINE) 50 mg in dextrose 5 % 250 mL infusion,  mcg/min, Intravenous, Continuous  sodium bicarbonate 100 mEq in dextrose 5 % 1,000 mL infusion, , Intravenous, Continuous  prismaSATE BGK 4/2.5 dialysis solution, , Dialysis, Continuous  prismaSol BGK 4/2.5 dialysis solution, , Dialysis, Continuous  potassium chloride 20 mEq/50 mL IVPB (Central Line), 20 mEq, Intravenous, PRN  magnesium sulfate 2000 mg in 50 mL IVPB premix, 2,000 mg, Intravenous, PRN  calcium gluconate 1000 mg in sodium chloride 50 mL, 1,000 mg, Intravenous, PRN **OR** calcium gluconate 2000 mg in sodium chloride 100 mL, 2,000 mg, Intravenous, PRN **OR** calcium gluconate 3,000 mg in dextrose 5 % 100 mL IVPB, 3,000 mg, Intravenous, PRN **OR** calcium gluconate 4,000 mg in dextrose 5 % 100 mL IVPB, 4,000 mg, Intravenous, PRN  sodium phosphate 6 mmol in dextrose 5 % 250 mL IVPB, 6 mmol, Intravenous, PRN **OR** sodium phosphate 12 mmol in dextrose 5 % 250 mL IVPB, 12 mmol, Intravenous, PRN **OR** sodium phosphate 18 mmol in dextrose 5 % 500 mL IVPB, 18 mmol, Intravenous, PRN **OR** sodium phosphate 24 mmol in dextrose 5 % 500 mL IVPB, 24 mmol, Intravenous, PRN  metoprolol tartrate (LOPRESSOR) tablet 50 mg, 50 mg, Oral, BID  rocuronium (ZEMURON) injection 50 mg, 50 mg, Intravenous, Once  vasopressin 20 Units in dextrose 5 % 100 mL infusion, 0.01-0.03 Units/min, Intravenous, Continuous  dexmedetomidine (PRECEDEX) 400 mcg in sodium chloride 0.9 % 100 mL infusion, 0.2-1.4 mcg/kg/hr, Intravenous, Continuous  DOPamine (INTROPIN) 400 mg in dextrose 5 % 250 mL infusion, 2-20 mcg/kg/min, Intravenous, Continuous  norepinephrine (LEVOPHED) 16 mg in dextrose 5% 250 mL infusion, 2-100 mcg/min, Intravenous, Continuous  sodium bicarbonate 8.4 % injection 50 mEq, 50 mEq, Intravenous, Once  [Held by provider] enoxaparin (LOVENOX) injection 30 mg, 30 mg, Subcutaneous, Daily  bisacodyl (DULCOLAX) EC tablet 5 mg, 5 mg, Oral, Daily PRN  midodrine (PROAMATINE) tablet 15 mg, 15 mg, Oral, TID WC  fludrocortisone (FLORINEF) tablet 0.1 mg, 0.1 mg, Oral, Daily  lidocaine PF 1 % injection 5 mL, 5 mL, Intradermal, Once  sodium chloride flush 0.9 % injection 5-40 mL, 5-40 mL, Intravenous, 2 times per day  sodium chloride flush 0.9 % injection 5-40 mL, 5-40 mL, Intravenous, PRN  0.9 % sodium chloride infusion, 25 mL, Intravenous, PRN  sodium chloride flush 0.9 % injection 10 mL, 10 mL, Intravenous, 2 times per day  sodium chloride flush 0.9 % injection 10 mL, 10 mL, Intravenous, PRN  ondansetron (ZOFRAN) injection 4 mg, 4 mg, Intravenous, Q6H PRN  metoclopramide (REGLAN) injection 10 mg, 10 mg, Intravenous, Q6H PRN  aspirin EC tablet 325 mg, 325 mg, Oral, Daily  traMADol (ULTRAM) tablet 50 mg, 50 mg, Oral, Q6H PRN **OR** traMADol (ULTRAM) tablet 100 mg, 100 mg, Oral, Q6H PRN  morphine (PF) injection 2 mg, 2 mg, Intravenous, Q2H PRN  diphenhydrAMINE (BENADRYL) tablet 25 mg, 25 mg, Oral, Nightly PRN  zolpidem (AMBIEN) tablet 5 mg, 5 mg, Oral, Nightly PRN  sennosides-docusate sodium (SENOKOT-S) 8.6-50 MG tablet 1 tablet, 1 tablet, Oral, BID  magnesium hydroxide (MILK OF MAGNESIA) 400 MG/5ML suspension 30 mL, 30 mL, Oral, Daily PRN  albuterol sulfate  (90 Base) MCG/ACT inhaler 2 puff, 2 puff, Inhalation, Q6H PRN  furosemide (LASIX) injection 20 mg, 20 mg, Intravenous, PRN  glucose (GLUTOSE) 40 % oral gel 15 g, 15 g, Oral, PRN  dextrose 50 % IV solution, 12.5 g, Intravenous, PRN  glucagon (rDNA) injection 1 mg, 1 mg, Intramuscular, PRN  dextrose 5 % solution, 100 mL/hr, Intravenous, PRN    Allergies   Allergen Reactions    Atorvastatin      Muscle weakness, failed every statin attempted    Livalo [Pitavastatin Calcium]      Muscle weakness       Social History:    TOBACCO:   reports that he has never smoked. He has never used smokeless tobacco.  ETOH:   reports no history of alcohol use. Patient currently lives independently  Environmental/chemical exposure: None known    Family History:       Problem Relation Age of Onset    High Blood Pressure Mother     Diabetes Mother     High Blood Pressure Father     High Blood Pressure Brother      REVIEW OF SYSTEMS:    ROS is unobtainable due to his critical illness. Objective:   PHYSICAL EXAM:      VITALS:  /75   Pulse 77   Temp 99.4 °F (37.4 °C) (Core)   Resp 17   Ht 5' 9\" (1.753 m)   Wt 247 lb 2.2 oz (112.1 kg)   SpO2 97%   BMI 36.50 kg/m²      24HR INTAKE/OUTPUT:      Intake/Output Summary (Last 24 hours) at 6/9/2021 0913  Last data filed at 6/9/2021 0900  Gross per 24 hour   Intake 5410.65 ml   Output 2452 ml   Net 2958.65 ml     CONSTITUTIONAL: On mechanical ventilation and sedated but responsive  NECK:  Supple, symmetrical, trachea midline, no adenopathy, thyroid symmetric, not enlarged and no tenderness, skin normal  LUNGS:  no increased work of breathing and clear to auscultation. No accessory muscle use  CARDIOVASCULAR: S1 and S2, no edema and no JVD  ABDOMEN:  normal bowel sounds, mildly distended but soft and no masses palpated, and no tenderness to palpation.  No hepatospleenomegaly  LYMPHADENOPATHY:  no axillary or supraclavicular adenopathy. No cervical adnenopathy  PSYCHIATRIC: Sedated but responsive seems appropriate. MUSCULOSKELETAL: No obvious misalignment or effusion of the joints. No clubbing, cyanosis of the digits. SKIN:  normal skin color, texture, turgor and no redness, warmth, or swelling. No palpable nodules    DATA:    Old records have been reviewed    CBC:  Recent Labs     06/07/21  1930 06/07/21  2306 06/08/21  0539 06/08/21  1759 06/09/21  0459 06/09/21  0749   WBC 16.0*  --  20.9*  --  15.9*  --    RBC 2.84*  --  3.40*  --  3.11*  --    HGB 8.0* 9.2* 9.7* 10.4* 8.9* 10.6*   HCT 25.5* 30.2* 30.1*  --  27.8*  --      --  219  --  182  --    MCV 90.0  --  88.5  --  89.4  --    MCH 28.1  --  28.5  --  28.7  --    MCHC 31.3  --  32.2  --  32.2  --    RDW 19.1*  --  18.0*  --  18.2*  --       BMP:  Recent Labs     06/08/21  1925 06/09/21  0459 06/09/21  0748   * 134* 133*   K 4.5 4.8 4.8   CL 91* 95* 95*   CO2 25 23 24   * 75* 70*   CREATININE 3.0* 2.2* 2.1*   CALCIUM 8.8 8.5 8.7   GLUCOSE 82 91 65*      ABG:  Recent Labs     06/09/21  0006 06/09/21  0457 06/09/21  0749   PHART 7.404 7.346* 7.345*   ODY0AXU 42.3 45.2* 47.9*   PO2ART 70.4* 76.2 70.9*   VYG7GNR 26.5 24.7 26.0   [id number] 94 94 92*   BEART 2 -1 1     Procalcitonin  Recent Labs     06/08/21  1506   PROCAL 2.50*       No results found for: BNP  Lab Results   Component Value Date    TROPONINI 0.02 (H) 05/20/2021           Radiology Review:  All pertinent images / reports were reviewed as a part of this visit. Assessment:     1. Postoperative respiratory failure  2. Acute on chronic kidney disease  3. CAD status post CABG  4. Ischemic hepatitis      Plan:     I have reviewed laboratories, medical records and images for this visit  Now on CRRT and tolerating this well.   Fluid neutral. Will likely need fluid removal at some point  Continues to be hypoxemic with a PO2 of only 68 on FiO2 0.8. Chest x-ray yesterday showed increasing pulmonary edema  Imaging from today is pending  Not ready to wean from mechanical ventilation  Having some trouble with sedation  Now has orders for intermittent Versed  Start him on a little bit of fentanyl as well. This often helps quite a bit. Continues to show evidence of ischemic hepatitis though LFTs are downtrending. Discussed with the bedside nurse and critical care team this morning. Total critical care time caring for this patient with life threatening illness, including direct patient contact, management of life support systems, review of data including imaging and labs, discussions with other team members and physicians is at least 32 minutes so far today, excluding procedures.

## 2021-06-25 ENCOUNTER — TELEPHONE (OUTPATIENT)
Dept: FAMILY MEDICINE CLINIC | Facility: CLINIC | Age: 31
End: 2021-06-25

## 2021-06-25 NOTE — TELEPHONE ENCOUNTER
Called pt to inform that dr Aurelia Handy is out of the office today. She will be in 6/26/21 for half a day. Printed out [address] notes 6/3/2021 and immunization record placed on dr Elizabeth Lizarraga desk to sign off.  Printed out OV notes and immunization record for son- Anika Concepcion
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
## 2021-08-16 ENCOUNTER — TELEPHONE (OUTPATIENT)
Dept: CASE MANAGEMENT | Age: 31
End: 2021-08-16

## 2021-08-16 ENCOUNTER — HOSPITAL ENCOUNTER (OUTPATIENT)
Age: 31
Discharge: HOME | End: 2021-08-16
Payer: MEDICAID

## 2021-08-16 VITALS
SYSTOLIC BLOOD PRESSURE: 127 MMHG | BODY MASS INDEX: 27.6 KG/M2 | RESPIRATION RATE: 18 BRPM | HEART RATE: 84 BPM | DIASTOLIC BLOOD PRESSURE: 99 MMHG | OXYGEN SATURATION: 98 % | WEIGHT: 150 LBS | TEMPERATURE: 98 F | HEIGHT: 62 IN

## 2021-08-16 DIAGNOSIS — Z20.822 ENCOUNTER FOR LABORATORY TESTING FOR COVID-19 VIRUS: Primary | ICD-10-CM

## 2021-08-16 DIAGNOSIS — G44.89 OTHER HEADACHE SYNDROME: ICD-10-CM

## 2021-08-16 DIAGNOSIS — U07.1 LAB TEST POSITIVE FOR DETECTION OF COVID-19 VIRUS: ICD-10-CM

## 2021-08-16 LAB — SARS-COV-2 RNA RESP QL NAA+PROBE: DETECTED

## 2021-08-16 PROCEDURE — 99214 OFFICE O/P EST MOD 30 MIN: CPT | Performed by: EMERGENCY MEDICINE

## 2021-08-16 PROCEDURE — U0002 COVID-19 LAB TEST NON-CDC: HCPCS | Performed by: EMERGENCY MEDICINE

## 2021-08-16 RX ORDER — IBUPROFEN 600 MG/1
600 TABLET ORAL EVERY 6 HOURS PRN
Qty: 20 TABLET | Refills: 0 | Status: SHIPPED | OUTPATIENT
Start: 2021-08-16

## 2021-08-16 RX ORDER — METOCLOPRAMIDE 10 MG/1
10 TABLET ORAL 3 TIMES DAILY PRN
Qty: 20 TABLET | Refills: 0 | Status: SHIPPED | OUTPATIENT
Start: 2021-08-16

## 2021-08-16 NOTE — ED PROVIDER NOTES
Patient Seen in: [facility]      History   Patient presents with:  Cough/URI    Stated Complaint: son/mother covid +/ha/cough/sorethroat/abd pain/diarrhea/nursing mother    HPI/Subjective:   HPI    Peri Bosworth is a 32year old fema reviewed and negative except as noted above.     Physical Exam     ED Triage Vitals [08/16/21 1002]   BP (!) 127/99   Pulse 84   Resp 18   Temp 98.1 °F (36.7 °C)   Temp src Temporal   SpO2 98 %   O2 Device None (Room air)       Current:BP (!) 127/99   Pulse for Casirivimab and Imdevimab . They have had mild symptoms for 2 days. They do not require oxygen or hospitalization and have the following risks factors bmi >25.     The patient/caregiver has been given the “Fact Sheet for Patients, Parents and Caregiver Tab  Take 1 tablet (600 mg total) by mouth every 6 (six) hours as needed for Pain., Normal, Disp-20 tablet, R-0  NPI [id number]. Collaborating MD Pete Shipman.       Medical Record Review/reassessment: I independently  reviewed available prior medical r

## 2021-08-16 NOTE — ED INITIAL ASSESSMENT (HPI)
Pt presents to the IC with c/o a cough, headache, abd pain, diarrhea and exposure to 2 covid positive patients who tested positive in the last couple days. Pt is concerned because she is breastfeeding and wants \"peace of mind\".

## 2021-08-26 ENCOUNTER — TELEPHONE (OUTPATIENT)
Dept: FAMILY MEDICINE CLINIC | Facility: CLINIC | Age: 31
End: 2021-08-26

## 2021-08-26 NOTE — TELEPHONE ENCOUNTER
Spoke to patient. Covid + 8/16/21. Patient requesting video visit follow up with Dr. Sameer Chavez to discuss Covid symptoms. They are improving but still with fatigue, headache, diarrhea, \"shortness of breath when I move around\", loss of taste/smell.  Relayed

## 2021-08-30 ENCOUNTER — TELEMEDICINE (OUTPATIENT)
Dept: FAMILY MEDICINE CLINIC | Facility: CLINIC | Age: 31
End: 2021-08-30
Payer: MEDICAID

## 2021-08-30 ENCOUNTER — PATIENT MESSAGE (OUTPATIENT)
Dept: FAMILY MEDICINE CLINIC | Facility: CLINIC | Age: 31
End: 2021-08-30

## 2021-08-30 DIAGNOSIS — A08.39 DIARRHEA DUE TO COVID-19: Primary | ICD-10-CM

## 2021-08-30 DIAGNOSIS — U07.1 DIARRHEA DUE TO COVID-19: Primary | ICD-10-CM

## 2021-08-30 PROCEDURE — 99213 OFFICE O/P EST LOW 20 MIN: CPT | Performed by: STUDENT IN AN ORGANIZED HEALTH CARE EDUCATION/TRAINING PROGRAM

## 2021-08-30 RX ORDER — LOPERAMIDE HYDROCHLORIDE 2 MG/1
2 CAPSULE ORAL 4 TIMES DAILY PRN
Qty: 30 CAPSULE | Refills: 0 | Status: CANCELLED | OUTPATIENT
Start: 2021-08-30

## 2021-08-30 NOTE — PROGRESS NOTES
Virtual Telephone Check-In    Nasreen Hall verbally consents to a Virtual/Telephone Check-In visit on 08/30/21. Patient has been referred to the [facility] website at [email or web address] to review the yearly Consent to Treat document.     Patient detailed in the plan of care above. Coding/billing information is submitted for this visit based on complexity of care and/or time spent for the visit. HPI:    Patient ID: John York is a 32year old female.     HPI  Pt presenting via video Reported on 5/7/2021 ) 30 tablet 1   • Sertraline HCl 25 MG Oral Tab Take 1 tablet (25 mg total) by mouth daily.  (Patient not taking: Reported on 5/7/2021 ) 90 tablet 1   • prenatal multivitamin plus DHA 27-0.8-228 MG Oral Cap Take 1 capsule by mouth daily

## 2021-08-31 NOTE — TELEPHONE ENCOUNTER
From: Juany Hall  To: Eliot Dominique MD  Sent: 8/30/2021 2:48 PM CDT  Subject: Visit Follow-up Question    Also when can I get the covid vaccination.

## 2021-09-16 ENCOUNTER — HOSPITAL ENCOUNTER (OUTPATIENT)
Age: 31
Discharge: HOME | End: 2021-09-16
Payer: MEDICAID

## 2021-09-16 VITALS
HEIGHT: 62 IN | SYSTOLIC BLOOD PRESSURE: 140 MMHG | WEIGHT: 145 LBS | DIASTOLIC BLOOD PRESSURE: 92 MMHG | BODY MASS INDEX: 26.68 KG/M2 | HEART RATE: 82 BPM | RESPIRATION RATE: 16 BRPM | TEMPERATURE: 98 F | OXYGEN SATURATION: 100 %

## 2021-09-16 DIAGNOSIS — S05.01XA ABRASION OF RIGHT CORNEA, INITIAL ENCOUNTER: Primary | ICD-10-CM

## 2021-09-16 PROCEDURE — 99213 OFFICE O/P EST LOW 20 MIN: CPT | Performed by: NURSE PRACTITIONER

## 2021-09-16 RX ORDER — TOBRAMYCIN 3 MG/ML
2 SOLUTION/ DROPS OPHTHALMIC EVERY 6 HOURS
Qty: 1 EACH | Refills: 0 | Status: SHIPPED | OUTPATIENT
Start: 2021-09-16 | End: 2021-09-23

## 2021-09-16 RX ORDER — PURIFIED WATER 986 MG/ML
5 SOLUTION OPHTHALMIC ONCE
Status: COMPLETED | OUTPATIENT
Start: 2021-09-16 | End: 2021-09-16

## 2021-09-16 RX ORDER — TETRACAINE HYDROCHLORIDE 5 MG/ML
1 SOLUTION OPHTHALMIC ONCE
Status: COMPLETED | OUTPATIENT
Start: 2021-09-16 | End: 2021-09-16

## 2021-09-16 NOTE — ED INITIAL ASSESSMENT (HPI)
Patient with 2 days of right eye irritation.  Patient reports feeling that a Karn Cassette is stuck inside eylid\"

## 2021-09-16 NOTE — ED PROVIDER NOTES
Patient presents with:  Eye Problem      HPI:     Elvis Rosales is a 32year old female who presents today with a chief complaint of right eye redness and irritation for the past 2 days.   She is unsure if something got into her eye at work a coupl Asked        Blood Transfusions: Not Asked        Caffeine Concern: Yes          soda, 1-2 cans oc.         Occupational Exposure: Not Asked        Hobby Hazards: Not Asked        Sleep Concern: Not Asked        Stress Concern: Not Asked        Weight Lucila Vital Signs Reviewed.       Physical Exam:         Physical Exam:    BP (!) 140/92   Pulse 82   Temp 97.6 °F (36.4 °C) (Temporal)   Resp 16   Ht 157.5 cm (5' 2\")   Wt 65.8 kg   LMP 08/20/2021 (Exact Date)   SpO2 100%   BMI 26.52 kg/m²   GENERAL: well devel detailed discharge instructions for your condition today.     Follow Up with:  Ophthalmology  Call your PMD for referral.  Schedule an appointment as soon as possible for a visit in 2 days

## 2021-10-19 ENCOUNTER — TELEPHONE (OUTPATIENT)
Dept: FAMILY MEDICINE CLINIC | Facility: CLINIC | Age: 31
End: 2021-10-19

## 2021-10-19 NOTE — TELEPHONE ENCOUNTER
Per ptient she would like the letter in her file dater 09/04/2021 to print up and she will come and pick it up. Per patient she would like to come in today if possible due to she needs it for work. Also patient is asking a copy of her after summary during her Urgent care visit.

## 2021-10-20 NOTE — TELEPHONE ENCOUNTER
Per pt's request, please print letter generated on 9/4/2021, as well as visit summary from recent urgent care visit. Contact pt for pick-up.

## 2021-10-20 NOTE — TELEPHONE ENCOUNTER
Letter printed and AVS from . Called pt to notify. Paperwork placed at the  [facility].

## 2021-11-11 ENCOUNTER — OFFICE VISIT (OUTPATIENT)
Dept: FAMILY MEDICINE CLINIC | Facility: CLINIC | Age: 31
End: 2021-11-11
Payer: MEDICAID

## 2021-11-11 VITALS
BODY MASS INDEX: 28.89 KG/M2 | DIASTOLIC BLOOD PRESSURE: 101 MMHG | HEART RATE: 84 BPM | WEIGHT: 157 LBS | HEIGHT: 62 IN | SYSTOLIC BLOOD PRESSURE: 142 MMHG

## 2021-11-11 DIAGNOSIS — R21 RASH AND NONSPECIFIC SKIN ERUPTION: Primary | ICD-10-CM

## 2021-11-11 PROCEDURE — 99213 OFFICE O/P EST LOW 20 MIN: CPT | Performed by: FAMILY MEDICINE

## 2021-11-11 PROCEDURE — 3080F DIAST BP >= 90 MM HG: CPT | Performed by: FAMILY MEDICINE

## 2021-11-11 PROCEDURE — 3008F BODY MASS INDEX DOCD: CPT | Performed by: FAMILY MEDICINE

## 2021-11-11 PROCEDURE — 3077F SYST BP >= 140 MM HG: CPT | Performed by: FAMILY MEDICINE

## 2021-11-11 RX ORDER — METHYLPREDNISOLONE 4 MG/1
TABLET ORAL
Qty: 1 EACH | Refills: 0 | Status: SHIPPED | OUTPATIENT
Start: 2021-11-11

## 2021-11-11 NOTE — PROGRESS NOTES
Subjective:   Patient ID: Shama Mckeon is a 32year old female. Pt presents with an itchy rash on the whole body over the last week. Pt had initial spot on the left thigh which resolved. No new topical agents or ingestions.  Pt has tried otc re Exam  Constitutional:       Appearance: Normal appearance. Pulmonary:      Effort: Pulmonary effort is normal. No respiratory distress. Skin:     Comments: Papular rash on the trunk and abdomen. No other skin lesions.    Neurological:      Mental Status

## 2021-11-20 ENCOUNTER — TELEPHONE (OUTPATIENT)
Dept: FAMILY MEDICINE CLINIC | Facility: CLINIC | Age: 31
End: 2021-11-20

## 2021-11-20 NOTE — TELEPHONE ENCOUNTER
Verified pt name and . Reviewed Maite's recommendations as noted below. Pt states she needs appt after 4:30 PM due to work schedule. As no late appt available with any provider pt scheduled appt with Dr. Denilson Trent 21.  Pt will call back sooner if s

## 2021-11-20 NOTE — TELEPHONE ENCOUNTER
She should be seen in the office by someone before another medrol pack is given. Continue with cortisone cream for now. Take bendaryl as well.

## 2021-11-22 RX ORDER — METHYLPREDNISOLONE 4 MG/1
TABLET ORAL
Qty: 1 EACH | Refills: 0 | OUTPATIENT
Start: 2021-11-22

## 2021-11-27 ENCOUNTER — OFFICE VISIT (OUTPATIENT)
Dept: FAMILY MEDICINE CLINIC | Facility: CLINIC | Age: 31
End: 2021-11-27
Payer: MEDICAID

## 2021-11-27 VITALS
SYSTOLIC BLOOD PRESSURE: 130 MMHG | RESPIRATION RATE: 18 BRPM | HEART RATE: 73 BPM | HEIGHT: 62 IN | DIASTOLIC BLOOD PRESSURE: 90 MMHG | BODY MASS INDEX: 28.89 KG/M2 | WEIGHT: 157 LBS

## 2021-11-27 DIAGNOSIS — I10 HYPERTENSION, UNSPECIFIED TYPE: ICD-10-CM

## 2021-11-27 DIAGNOSIS — L28.2 PRURITIC RASH: Primary | ICD-10-CM

## 2021-11-27 PROCEDURE — 99213 OFFICE O/P EST LOW 20 MIN: CPT | Performed by: STUDENT IN AN ORGANIZED HEALTH CARE EDUCATION/TRAINING PROGRAM

## 2021-11-27 PROCEDURE — 3080F DIAST BP >= 90 MM HG: CPT | Performed by: STUDENT IN AN ORGANIZED HEALTH CARE EDUCATION/TRAINING PROGRAM

## 2021-11-27 PROCEDURE — 3008F BODY MASS INDEX DOCD: CPT | Performed by: STUDENT IN AN ORGANIZED HEALTH CARE EDUCATION/TRAINING PROGRAM

## 2021-11-27 PROCEDURE — 3075F SYST BP GE 130 - 139MM HG: CPT | Performed by: STUDENT IN AN ORGANIZED HEALTH CARE EDUCATION/TRAINING PROGRAM

## 2021-11-27 RX ORDER — HYDROCHLOROTHIAZIDE 12.5 MG/1
12.5 TABLET ORAL DAILY
Qty: 90 TABLET | Refills: 3 | Status: SHIPPED | OUTPATIENT
Start: 2021-11-27

## 2021-11-27 RX ORDER — METHYLPREDNISOLONE 4 MG/1
TABLET ORAL
Qty: 1 EACH | Refills: 0 | Status: SHIPPED | OUTPATIENT
Start: 2021-11-27

## 2021-11-27 RX ORDER — LEVOCETIRIZINE DIHYDROCHLORIDE 5 MG/1
5 TABLET, FILM COATED ORAL 4 TIMES DAILY PRN
Qty: 30 TABLET | Refills: 1 | Status: SHIPPED | OUTPATIENT
Start: 2021-11-27

## 2021-11-27 NOTE — PROGRESS NOTES
HPI:    Patient ID: Norma Gallego is a 32year old female. HPI  Pt presenting for rash follow-up. She was seen 11/11 for pruritic rash, started Medrol pack which faded rash. However she reports recurrent symptoms on back, arms, trunk.  She is un Tab Take 1 tablet (12.5 mg total) by mouth daily. 30 tablet 0   • amLODIPine Besylate 5 MG Oral Tab Take 5 mg by mouth daily. • ergocalciferol 1.25 MG (70373 UT) Oral Cap Take by mouth once a week.      • ibuprofen 600 MG Oral Tab Take 1 tablet (600 mg Dispense: 1 each; Refill: 0  - levocetirizine 5 MG Oral Tab; Take 1 tablet (5 mg total) by mouth 4 (four) times daily as needed for Allergies (itching). Dispense: 30 tablet; Refill: 1    2.  Hypertension, unspecified type  In-office BP elevated, pt otherwi

## 2022-01-11 ENCOUNTER — OFFICE VISIT (OUTPATIENT)
Dept: FAMILY MEDICINE CLINIC | Facility: CLINIC | Age: 32
End: 2022-01-11
Payer: MEDICAID

## 2022-01-11 VITALS
DIASTOLIC BLOOD PRESSURE: 82 MMHG | HEIGHT: 62 IN | WEIGHT: 150 LBS | BODY MASS INDEX: 27.6 KG/M2 | SYSTOLIC BLOOD PRESSURE: 126 MMHG | HEART RATE: 62 BPM

## 2022-01-11 DIAGNOSIS — N89.8 VAGINAL DISCHARGE: Primary | ICD-10-CM

## 2022-01-11 DIAGNOSIS — R82.90 ABNORMAL URINALYSIS: ICD-10-CM

## 2022-01-11 LAB
APPEARANCE: CLEAR
BILIRUBIN: NEGATIVE
GLUCOSE (URINE DIPSTICK): NEGATIVE MG/DL
KETONES (URINE DIPSTICK): NEGATIVE MG/DL
MULTISTIX LOT#: ABNORMAL NUMERIC
NITRITE, URINE: NEGATIVE
OCCULT BLOOD: NEGATIVE
PH, URINE: 6 (ref 4.5–8)
PROTEIN (URINE DIPSTICK): NEGATIVE MG/DL
SPECIFIC GRAVITY: 1.01 (ref 1–1.03)
URINE-COLOR: YELLOW
UROBILINOGEN,SEMI-QN: 0.2 MG/DL (ref 0–1.9)

## 2022-01-11 PROCEDURE — 99213 OFFICE O/P EST LOW 20 MIN: CPT | Performed by: PHYSICIAN ASSISTANT

## 2022-01-11 PROCEDURE — 3074F SYST BP LT 130 MM HG: CPT | Performed by: PHYSICIAN ASSISTANT

## 2022-01-11 PROCEDURE — 3079F DIAST BP 80-89 MM HG: CPT | Performed by: PHYSICIAN ASSISTANT

## 2022-01-11 PROCEDURE — 3008F BODY MASS INDEX DOCD: CPT | Performed by: PHYSICIAN ASSISTANT

## 2022-01-11 PROCEDURE — 81002 URINALYSIS NONAUTO W/O SCOPE: CPT | Performed by: PHYSICIAN ASSISTANT

## 2022-01-11 NOTE — PROGRESS NOTES
HPI:     Vaginal Discharge  The patient's primary symptoms include a genital odor and vaginal discharge. The patient's pertinent negatives include no genital itching, genital lesions or vaginal bleeding. Episode onset: a week.  The problem has been unchange directed. (Patient not taking: Reported on 1/11/2022) 1 each 0   • traMADol HCl 50 MG Oral Tab Take 1 tablet (50 mg total) by mouth every 8 (eight) hours as needed for Pain.  (Patient not taking: Reported on 1/11/2022) 30 tablet 1   • methylPREDNISolone (ME Procedure Laterality Date   • Colposcopy, cervix w/upper adjacent vagina; w/biopsy(s), cervix     •   2014   • Oral surgery procedure      wisdom teeth extraction       Family History:     Family History   Problem Relation Age of Onset   • D Negative for activity change, chills, fatigue and fever. HENT: Negative for congestion, ear discharge, ear pain, postnasal drip, rhinorrhea, sinus pressure, sinus pain and sore throat.     Respiratory: Negative for cough, chest tightness, shortness of cat Cervical: No cervical adenopathy. Skin:     Findings: No rash. Neurological:      Mental Status: She is alert and oriented to person, place, and time. Psychiatric:         Behavior: Behavior is cooperative.           Assessment and Plan[de-identified]     Problem

## 2022-01-13 LAB
CHLAMYDIA TRACHOMATIS$RNA, TMA: NOT DETECTED
NEISSERIA GONORRHOEAE$RNA, TMA: NOT DETECTED

## 2022-01-14 LAB
GENITAL VAGINOSIS SCREEN: NEGATIVE
TRICHOMONAS SCREEN: NEGATIVE

## 2022-01-17 ENCOUNTER — PATIENT MESSAGE (OUTPATIENT)
Dept: FAMILY MEDICINE CLINIC | Facility: CLINIC | Age: 32
End: 2022-01-17

## 2022-01-17 NOTE — TELEPHONE ENCOUNTER
Selena Dai RN 1/17/2022 4:08 PM CST        ----- Message -----  From: Bautista Hall  Sent: 1/17/2022 12:35 PM CST  To: Em Rn Triage  Subject: Request     Can you send me Rx prescription for diflucan with refill

## 2022-03-11 ENCOUNTER — APPOINTMENT (OUTPATIENT)
Dept: CT IMAGING | Facility: HOSPITAL | Age: 32
End: 2022-03-11
Attending: NURSE PRACTITIONER
Payer: MEDICAID

## 2022-03-11 ENCOUNTER — APPOINTMENT (OUTPATIENT)
Dept: GENERAL RADIOLOGY | Facility: HOSPITAL | Age: 32
End: 2022-03-11
Attending: NURSE PRACTITIONER
Payer: MEDICAID

## 2022-03-11 ENCOUNTER — HOSPITAL ENCOUNTER (EMERGENCY)
Facility: HOSPITAL | Age: 32
Discharge: HOME | End: 2022-03-11
Payer: MEDICAID

## 2022-03-11 VITALS
BODY MASS INDEX: 27.6 KG/M2 | DIASTOLIC BLOOD PRESSURE: 81 MMHG | RESPIRATION RATE: 16 BRPM | OXYGEN SATURATION: 99 % | WEIGHT: 150 LBS | TEMPERATURE: 98 F | SYSTOLIC BLOOD PRESSURE: 121 MMHG | HEIGHT: 62 IN | HEART RATE: 64 BPM

## 2022-03-11 DIAGNOSIS — R00.2 PALPITATIONS: Primary | ICD-10-CM

## 2022-03-11 DIAGNOSIS — E87.6 HYPOKALEMIA: ICD-10-CM

## 2022-03-11 LAB
ANION GAP SERPL CALC-SCNC: 6 MMOL/L (ref 0–18)
B-HCG UR QL: NEGATIVE
BASOPHILS # BLD AUTO: 0.04 X10(3) UL (ref 0–0.2)
BASOPHILS NFR BLD AUTO: 0.8 %
BUN BLD-MCNC: 11 MG/DL (ref 7–18)
BUN/CREAT SERPL: 15.3 (ref 10–20)
CALCIUM BLD-MCNC: 9.1 MG/DL (ref 8.5–10.1)
CHLORIDE SERPL-SCNC: 107 MMOL/L (ref 98–112)
CREAT BLD-MCNC: 0.72 MG/DL
D DIMER PPP FEU-MCNC: 0.71 UG/ML FEU (ref ?–0.5)
DEPRECATED RDW RBC AUTO: 39.7 FL (ref 35.1–46.3)
EOSINOPHIL # BLD AUTO: 0.15 X10(3) UL (ref 0–0.7)
EOSINOPHIL NFR BLD AUTO: 3 %
ERYTHROCYTE [DISTWIDTH] IN BLOOD BY AUTOMATED COUNT: 12 % (ref 11–15)
GLUCOSE BLD-MCNC: 84 MG/DL (ref 70–99)
HCT VFR BLD AUTO: 38.5 %
HGB BLD-MCNC: 12.5 G/DL
IMM GRANULOCYTES # BLD AUTO: 0.01 X10(3) UL (ref 0–1)
IMM GRANULOCYTES NFR BLD: 0.2 %
LYMPHOCYTES # BLD AUTO: 2.04 X10(3) UL (ref 1–4)
LYMPHOCYTES NFR BLD AUTO: 40.8 %
MCH RBC QN AUTO: 28.9 PG (ref 26–34)
MCHC RBC AUTO-ENTMCNC: 32.5 G/DL (ref 31–37)
MCV RBC AUTO: 89.1 FL
MONOCYTES # BLD AUTO: 0.41 X10(3) UL (ref 0.1–1)
MONOCYTES NFR BLD AUTO: 8.2 %
NEUTROPHILS # BLD AUTO: 2.35 X10 (3) UL (ref 1.5–7.7)
NEUTROPHILS # BLD AUTO: 2.35 X10(3) UL (ref 1.5–7.7)
NEUTROPHILS NFR BLD AUTO: 47 %
PLATELET # BLD AUTO: 269 10(3)UL (ref 150–450)
POTASSIUM SERPL-SCNC: 3.3 MMOL/L (ref 3.5–5.1)
RBC # BLD AUTO: 4.32 X10(6)UL
SODIUM SERPL-SCNC: 140 MMOL/L (ref 136–145)
TROPONIN I HIGH SENSITIVITY: <3 NG/L
WBC # BLD AUTO: 5 X10(3) UL (ref 4–11)

## 2022-03-11 PROCEDURE — 84484 ASSAY OF TROPONIN QUANT: CPT | Performed by: NURSE PRACTITIONER

## 2022-03-11 PROCEDURE — 81025 URINE PREGNANCY TEST: CPT

## 2022-03-11 PROCEDURE — 71260 CT THORAX DX C+: CPT | Performed by: NURSE PRACTITIONER

## 2022-03-11 PROCEDURE — 93010 ELECTROCARDIOGRAM REPORT: CPT | Performed by: INTERNAL MEDICINE

## 2022-03-11 PROCEDURE — 85379 FIBRIN DEGRADATION QUANT: CPT | Performed by: NURSE PRACTITIONER

## 2022-03-11 PROCEDURE — 36415 COLL VENOUS BLD VENIPUNCTURE: CPT

## 2022-03-11 PROCEDURE — 80048 BASIC METABOLIC PNL TOTAL CA: CPT | Performed by: NURSE PRACTITIONER

## 2022-03-11 PROCEDURE — 71045 X-RAY EXAM CHEST 1 VIEW: CPT | Performed by: NURSE PRACTITIONER

## 2022-03-11 PROCEDURE — 99284 EMERGENCY DEPT VISIT MOD MDM: CPT

## 2022-03-11 PROCEDURE — 85025 COMPLETE CBC W/AUTO DIFF WBC: CPT | Performed by: NURSE PRACTITIONER

## 2022-03-11 PROCEDURE — 93005 ELECTROCARDIOGRAM TRACING: CPT

## 2022-03-11 NOTE — ED INITIAL ASSESSMENT (HPI)
Pt came in for mid chest pain, palpitations, shortness of breath started this morning. Pt with History of Hypertension. Pt is a/ox  4.

## 2022-04-16 ENCOUNTER — OFFICE VISIT (OUTPATIENT)
Dept: OBGYN CLINIC | Facility: CLINIC | Age: 32
End: 2022-04-16
Payer: MEDICAID

## 2022-04-16 VITALS
SYSTOLIC BLOOD PRESSURE: 127 MMHG | DIASTOLIC BLOOD PRESSURE: 84 MMHG | WEIGHT: 161.63 LBS | BODY MASS INDEX: 29.74 KG/M2 | HEART RATE: 73 BPM | HEIGHT: 62 IN

## 2022-04-16 DIAGNOSIS — Z01.419 ENCOUNTER FOR GYNECOLOGICAL EXAMINATION WITHOUT ABNORMAL FINDING: Primary | ICD-10-CM

## 2022-04-16 DIAGNOSIS — B37.3 CANDIDIASIS OF VULVA AND VAGINA: ICD-10-CM

## 2022-04-16 DIAGNOSIS — Z11.3 SCREENING EXAMINATION FOR VENEREAL DISEASE: ICD-10-CM

## 2022-04-16 PROBLEM — B37.31 CANDIDIASIS OF VULVA AND VAGINA: Status: ACTIVE | Noted: 2022-04-16

## 2022-04-16 PROCEDURE — 87661 TRICHOMONAS VAGINALIS AMPLIF: CPT | Performed by: OBSTETRICS & GYNECOLOGY

## 2022-04-16 PROCEDURE — 87591 N.GONORRHOEAE DNA AMP PROB: CPT | Performed by: OBSTETRICS & GYNECOLOGY

## 2022-04-16 PROCEDURE — 87491 CHLMYD TRACH DNA AMP PROBE: CPT | Performed by: OBSTETRICS & GYNECOLOGY

## 2022-04-16 RX ORDER — FLUCONAZOLE 150 MG/1
150 TABLET ORAL ONCE
Qty: 2 TABLET | Refills: 0 | Status: SHIPPED | OUTPATIENT
Start: 2022-04-16 | End: 2022-04-16

## 2022-04-18 LAB
C TRACH DNA SPEC QL NAA+PROBE: NEGATIVE
N GONORRHOEA DNA SPEC QL NAA+PROBE: NEGATIVE
T VAGINALIS RRNA SPEC QL NAA+PROBE: NEGATIVE

## 2022-05-03 ENCOUNTER — TELEPHONE (OUTPATIENT)
Dept: OBGYN CLINIC | Facility: CLINIC | Age: 32
End: 2022-05-03

## 2022-05-03 RX ORDER — FLUCONAZOLE 150 MG/1
150 TABLET ORAL ONCE
Qty: 2 TABLET | Refills: 0 | Status: SHIPPED | OUTPATIENT
Start: 2022-05-03 | End: 2022-05-03

## 2022-05-03 NOTE — TELEPHONE ENCOUNTER
Patient was given diflucan x 2 dose at annual 4/16/22 with JEANA. She was told on exam that it appeared she may have a yeast infection, but she did not have itching or irritation. She finished diflucan, but now she is having increase cottage cheese discharge. Wondering if needs to be seen or if she can have additional doses? To JEANA to advise.

## 2022-05-12 ENCOUNTER — TELEPHONE (OUTPATIENT)
Dept: FAMILY MEDICINE CLINIC | Facility: CLINIC | Age: 32
End: 2022-05-12

## 2022-05-18 ENCOUNTER — TELEPHONE (OUTPATIENT)
Dept: OBGYN CLINIC | Facility: CLINIC | Age: 32
End: 2022-05-18

## 2022-05-18 RX ORDER — FLUCONAZOLE 150 MG/1
150 TABLET ORAL ONCE
Qty: 2 TABLET | Refills: 0 | Status: SHIPPED | OUTPATIENT
Start: 2022-05-18 | End: 2022-05-18

## 2022-05-18 RX ORDER — METRONIDAZOLE 500 MG/1
500 TABLET ORAL 2 TIMES DAILY
Qty: 14 TABLET | Refills: 0 | Status: SHIPPED | OUTPATIENT
Start: 2022-05-18 | End: 2022-05-25

## 2022-05-18 NOTE — TELEPHONE ENCOUNTER
Pt states she was in to see JEANA a few weeks ago and was diagnosed with yeast.  Pt was given diflucan, then a second dose because the first dose didn't work. Pt states the yeast symptoms went away with the second dose but now she is having symptoms of BV. Pt is complaining of runny discharge and a foul fishy odor. Pt denies any vaginal irritation. Pt cannot make an appt before 5pm.  There are no evening appts with JEANA or [facility]. Pt did schedule an appt with per pcp on Saturday, but pt does not want to wait. Pt states she is prone to both e BV and yeast.  Metrogel does not work, but flagyl will always cause a yeast infection. Message to JEANA for recs on possible rx or appt.

## 2022-06-14 ENCOUNTER — TELEPHONE (OUTPATIENT)
Dept: OBGYN CLINIC | Facility: CLINIC | Age: 32
End: 2022-06-14

## 2022-06-14 NOTE — TELEPHONE ENCOUNTER
Pt states she keeps getting BV and yeast infections. Pt has noticed that her symptoms occur one week before her period. Pt states she now has discharge and an odor. Per JEANA's notes from 5/18/2022 [employer] message, pt will need to have cultures if symptoms return. Appt made on 6/16 in [place].

## 2022-06-16 ENCOUNTER — OFFICE VISIT (OUTPATIENT)
Dept: OBGYN CLINIC | Facility: CLINIC | Age: 32
End: 2022-06-16
Payer: MEDICAID

## 2022-06-16 VITALS
BODY MASS INDEX: 30 KG/M2 | DIASTOLIC BLOOD PRESSURE: 104 MMHG | SYSTOLIC BLOOD PRESSURE: 145 MMHG | HEART RATE: 72 BPM | WEIGHT: 163.38 LBS

## 2022-06-16 DIAGNOSIS — N76.0 VAGINITIS AND VULVOVAGINITIS: ICD-10-CM

## 2022-06-16 DIAGNOSIS — N89.8 VAGINAL DISCHARGE: Primary | ICD-10-CM

## 2022-06-16 PROCEDURE — 99213 OFFICE O/P EST LOW 20 MIN: CPT | Performed by: OBSTETRICS & GYNECOLOGY

## 2022-06-16 PROCEDURE — 3077F SYST BP >= 140 MM HG: CPT | Performed by: OBSTETRICS & GYNECOLOGY

## 2022-06-16 PROCEDURE — 3080F DIAST BP >= 90 MM HG: CPT | Performed by: OBSTETRICS & GYNECOLOGY

## 2022-06-16 RX ORDER — FLUCONAZOLE 150 MG/1
TABLET ORAL
COMMUNITY
Start: 2022-05-19

## 2022-06-18 ENCOUNTER — TELEPHONE (OUTPATIENT)
Dept: OBGYN CLINIC | Facility: CLINIC | Age: 32
End: 2022-06-18

## 2022-06-18 LAB
GENITAL VAGINOSIS SCREEN: POSITIVE
TRICHOMONAS SCREEN: POSITIVE

## 2022-06-18 RX ORDER — METRONIDAZOLE 500 MG/1
500 TABLET ORAL 3 TIMES DAILY
Qty: 14 TABLET | Refills: 0 | Status: SHIPPED | OUTPATIENT
Start: 2022-06-18 | End: 2022-06-18 | Stop reason: CLARIF

## 2022-06-18 RX ORDER — METRONIDAZOLE 500 MG/1
500 TABLET ORAL 2 TIMES DAILY
Qty: 14 TABLET | Refills: 0 | Status: SHIPPED | OUTPATIENT
Start: 2022-06-18 | End: 2022-06-25

## 2022-06-18 NOTE — TELEPHONE ENCOUNTER
Spoke with JEANA in regards to message. Pt informed of +BV and trich. Pt informed that Saeed Yun is an STI and her partner will need to be notified and treated by his PCP. Flagyl 500mg bid x 7 days sent to pharmacy. Pt scheduled f/u with JEANA in August for FABRICE. Pt also asking if she can have diflucan x 2 tabs because she usually gets a yeast infection when on flagyl. Message to JEANA if OK for Diflucan.

## 2022-06-27 ENCOUNTER — TELEPHONE (OUTPATIENT)
Dept: OBGYN CLINIC | Facility: CLINIC | Age: 32
End: 2022-06-27

## 2022-06-27 DIAGNOSIS — B37.9 YEAST INFECTION: Primary | ICD-10-CM

## 2022-06-27 RX ORDER — FLUCONAZOLE 150 MG/1
150 TABLET ORAL
Qty: 2 TABLET | Refills: 0 | Status: SHIPPED | OUTPATIENT
Start: 2022-06-27

## 2022-06-27 NOTE — TELEPHONE ENCOUNTER
Per pt was on Flagyl and now has a possible yeast infection, wondering if Diflucan can be called in.  Please advise
Pt was recently treated for BV with flagyl. Pt is now having thick, white clumpy discharge. Pt has hx of reoccuring BV and yeast. OTC Monistat does not work for pt, JEANA prescribes Diflucan. Pt requesting RX, informed JEANA is out of office. Need to route to MD on-call. Allergies: Zithromax, Aleo Vera  [facility] in [place]     To CAP on-call to please review in JEANA absence. Thank you.
Spoke to CAP on-call indicates can send Rx for Diflucan 150 mg x2 72 hours apart. Pt called and informed that RX was sent.
English

## 2022-07-11 ENCOUNTER — TELEPHONE (OUTPATIENT)
Dept: OBGYN CLINIC | Facility: CLINIC | Age: 32
End: 2022-07-11

## 2022-07-11 RX ORDER — METRONIDAZOLE 500 MG/1
500 TABLET ORAL 2 TIMES DAILY
Qty: 14 TABLET | Refills: 0 | Status: SHIPPED | OUTPATIENT
Start: 2022-07-11 | End: 2022-07-18

## 2022-07-11 NOTE — TELEPHONE ENCOUNTER
Patient is still having creamy discharge and fishy odor, states consistent with BV. She denies itching or pelvic pain. Patient was + for trich and BV on 6/16/22 and treated with Flagyl. She developed yeast symptoms and was given diflucan 6/27/22. Symptoms cleared up for a week or so, but fishy odor and discharge returned 2 days ago. Recurrent history of BV and yeast since 2020. To JEANA to advise if able to treat or does she need appt in office.

## 2022-07-21 ENCOUNTER — TELEPHONE (OUTPATIENT)
Dept: OBGYN CLINIC | Facility: CLINIC | Age: 32
End: 2022-07-21

## 2022-07-21 RX ORDER — FLUCONAZOLE 150 MG/1
150 TABLET ORAL
Qty: 2 TABLET | Refills: 0 | Status: SHIPPED | OUTPATIENT
Start: 2022-07-21 | End: 2022-07-25

## 2022-07-21 NOTE — TELEPHONE ENCOUNTER
Pt c/o vaginal itching and a white crumbly discharge. She just finished med for BV and this \"always happens\". Pt requesting an rx from JEANA. Message sent.

## 2022-07-21 NOTE — TELEPHONE ENCOUNTER
Pt saw JEANA on 6/16 for BV, the RX he prescribed caused a yeast infection.  Pt would like RX sent to pharmacy

## 2022-08-03 ENCOUNTER — OFFICE VISIT (OUTPATIENT)
Dept: OBGYN CLINIC | Facility: CLINIC | Age: 32
End: 2022-08-03
Payer: MEDICAID

## 2022-08-03 ENCOUNTER — LAB ENCOUNTER (OUTPATIENT)
Dept: LAB | Facility: HOSPITAL | Age: 32
End: 2022-08-03
Attending: OBSTETRICS & GYNECOLOGY
Payer: MEDICAID

## 2022-08-03 VITALS
DIASTOLIC BLOOD PRESSURE: 88 MMHG | BODY MASS INDEX: 29 KG/M2 | WEIGHT: 157.19 LBS | SYSTOLIC BLOOD PRESSURE: 126 MMHG | HEART RATE: 97 BPM

## 2022-08-03 DIAGNOSIS — Z20.2 TRICHOMONAS CONTACT, TREATED: ICD-10-CM

## 2022-08-03 DIAGNOSIS — Z11.3 VENEREAL DISEASE SCREENING: ICD-10-CM

## 2022-08-03 DIAGNOSIS — N89.8 VAGINAL DISCHARGE: Primary | ICD-10-CM

## 2022-08-03 LAB
HBV SURFACE AG SER-ACNC: <0.1 [IU]/L
HBV SURFACE AG SERPL QL IA: NONREACTIVE
HCV AB SERPL QL IA: NONREACTIVE

## 2022-08-03 PROCEDURE — 87389 HIV-1 AG W/HIV-1&-2 AB AG IA: CPT | Performed by: OBSTETRICS & GYNECOLOGY

## 2022-08-03 PROCEDURE — 3074F SYST BP LT 130 MM HG: CPT | Performed by: OBSTETRICS & GYNECOLOGY

## 2022-08-03 PROCEDURE — 99213 OFFICE O/P EST LOW 20 MIN: CPT | Performed by: OBSTETRICS & GYNECOLOGY

## 2022-08-03 PROCEDURE — 3079F DIAST BP 80-89 MM HG: CPT | Performed by: OBSTETRICS & GYNECOLOGY

## 2022-08-03 PROCEDURE — 86780 TREPONEMA PALLIDUM: CPT | Performed by: OBSTETRICS & GYNECOLOGY

## 2022-08-03 PROCEDURE — 86803 HEPATITIS C AB TEST: CPT | Performed by: OBSTETRICS & GYNECOLOGY

## 2022-08-03 PROCEDURE — 36415 COLL VENOUS BLD VENIPUNCTURE: CPT | Performed by: OBSTETRICS & GYNECOLOGY

## 2022-08-03 PROCEDURE — 87340 HEPATITIS B SURFACE AG IA: CPT | Performed by: OBSTETRICS & GYNECOLOGY

## 2022-08-04 LAB
C TRACH DNA SPEC QL NAA+PROBE: NEGATIVE
N GONORRHOEA DNA SPEC QL NAA+PROBE: NEGATIVE
T PALLIDUM AB SER QL: NEGATIVE

## 2022-08-05 LAB
GENITAL VAGINOSIS SCREEN: NEGATIVE
TRICHOMONAS SCREEN: NEGATIVE

## 2022-08-10 ENCOUNTER — OFFICE VISIT (OUTPATIENT)
Dept: FAMILY MEDICINE CLINIC | Facility: CLINIC | Age: 32
End: 2022-08-10
Payer: MEDICAID

## 2022-08-10 VITALS
BODY MASS INDEX: 27.6 KG/M2 | DIASTOLIC BLOOD PRESSURE: 68 MMHG | HEART RATE: 92 BPM | OXYGEN SATURATION: 96 % | RESPIRATION RATE: 16 BRPM | SYSTOLIC BLOOD PRESSURE: 118 MMHG | TEMPERATURE: 99 F | HEIGHT: 62 IN | WEIGHT: 150 LBS

## 2022-08-10 DIAGNOSIS — J02.9 SORE THROAT: ICD-10-CM

## 2022-08-10 DIAGNOSIS — H65.191 OTHER NON-RECURRENT ACUTE NONSUPPURATIVE OTITIS MEDIA OF RIGHT EAR: Primary | ICD-10-CM

## 2022-08-10 LAB
CONTROL LINE PRESENT WITH A CLEAR BACKGROUND (YES/NO): YES YES/NO
KIT LOT #: NORMAL NUMERIC
STREP GRP A CUL-SCR: NEGATIVE

## 2022-08-10 PROCEDURE — 3008F BODY MASS INDEX DOCD: CPT | Performed by: NURSE PRACTITIONER

## 2022-08-10 PROCEDURE — 3074F SYST BP LT 130 MM HG: CPT | Performed by: NURSE PRACTITIONER

## 2022-08-10 PROCEDURE — 3078F DIAST BP <80 MM HG: CPT | Performed by: NURSE PRACTITIONER

## 2022-08-10 PROCEDURE — 87880 STREP A ASSAY W/OPTIC: CPT | Performed by: NURSE PRACTITIONER

## 2022-08-10 PROCEDURE — 99212 OFFICE O/P EST SF 10 MIN: CPT | Performed by: NURSE PRACTITIONER

## 2022-08-10 RX ORDER — AMOXICILLIN 875 MG/1
875 TABLET, COATED ORAL 2 TIMES DAILY
Qty: 20 TABLET | Refills: 0 | Status: SHIPPED | OUTPATIENT
Start: 2022-08-10 | End: 2022-08-20

## 2022-08-10 RX ORDER — FLUTICASONE PROPIONATE 50 MCG
2 SPRAY, SUSPENSION (ML) NASAL DAILY
Qty: 1 EACH | Refills: 0 | Status: SHIPPED | OUTPATIENT
Start: 2022-08-10 | End: 2022-08-24

## 2022-08-10 RX ORDER — IBUPROFEN 600 MG/1
600 TABLET ORAL EVERY 6 HOURS PRN
Qty: 20 TABLET | Refills: 0 | Status: SHIPPED | OUTPATIENT
Start: 2022-08-10

## 2022-08-11 LAB — SARS-COV-2 RNA RESP QL NAA+PROBE: NOT DETECTED

## 2022-08-15 ENCOUNTER — DOCUMENTATION ONLY (OUTPATIENT)
Dept: FAMILY MEDICINE CLINIC | Facility: CLINIC | Age: 32
End: 2022-08-15

## 2022-08-15 DIAGNOSIS — T36.95XA ANTIBIOTIC-INDUCED YEAST INFECTION: Primary | ICD-10-CM

## 2022-08-15 DIAGNOSIS — B37.9 ANTIBIOTIC-INDUCED YEAST INFECTION: Primary | ICD-10-CM

## 2022-08-15 RX ORDER — FLUCONAZOLE 150 MG/1
150 TABLET ORAL ONCE
Qty: 1 TABLET | Refills: 0 | Status: SHIPPED | OUTPATIENT
Start: 2022-08-15 | End: 2022-08-15

## 2022-08-15 NOTE — PROGRESS NOTES
Patient requesting note for work and diflucan. Had previous visit on 8/10/22 and feels like she is getting a yeast infection from the antibiotics she's been taking. . Work note provided with medication to pharmacy, no questions at this time.

## 2022-09-15 DIAGNOSIS — I10 HYPERTENSION, UNSPECIFIED TYPE: ICD-10-CM

## 2022-09-15 RX ORDER — HYDROCHLOROTHIAZIDE 12.5 MG/1
12.5 TABLET ORAL DAILY
Qty: 90 TABLET | Refills: 1 | Status: SHIPPED | OUTPATIENT
Start: 2022-09-15

## 2022-09-15 NOTE — TELEPHONE ENCOUNTER
Protocol failed or has No Protocol, please review    Routing as Dr. Westley Ibarra  is out of office     Requested Prescriptions   Pending Prescriptions Disp Refills    hydroCHLOROthiazide 12.5 MG Oral Tab 90 tablet 1     Sig: Take 1 tablet (12.5 mg total) by mouth daily. Hypertensive Medications Protocol Failed - 9/15/2022 11:33 AM        Failed - CMP or BMP in past 6 months     No results found for this or any previous visit (from the past 4392 hour(s)).               Failed - In person appointment or virtual visit in the past 6 months       Recent Outpatient Visits              1 month ago Other non-recurrent acute nonsuppurative otitis media of right ear    [facility], April, APRN    Office Visit    1 month ago Vaginal discharge    [facility], [address], [place], Madlyn Jeans, DO    Office Visit    3 months ago Vaginal discharge    [facility], [address] [facility], [place], Madlyn Jeans,     Office Visit    5 months ago Encounter for gynecological examination without abnormal finding    [facility], [address], [place], Madlyn Jeans,     Office Visit    8 months ago Vaginal discharge    [address], [place]    Office Visit     Future Appointments         Provider Department Appt Notes    In 2 weeks Kianna Herrera, [address] [facility], [facility] Px + blood work    In 7 months [place], Madlyn Jeans, [address] [facility], [address], [facility] - In person appointment in the past 12 or next 3 months       Recent Outpatient Visits              1 month ago Other non-recurrent acute nonsuppurative otitis media of right ear    [facility], April, APRN    Office Visit    1 month ago Vaginal discharge    [facility], [address], [place], Madlyn Jeans, DO    Office Visit    3 months ago Vaginal discharge    [facility], [address], [place] [id number] [place] [address], Valentina Amezcua, DO    Office Visit    5 months ago Encounter for gynecological examination without abnormal finding    [facility], [address], Valentina Buchanan, DO    Office Visit    8 months ago Vaginal discharge    [address] [place], [place]    Office Visit     Future Appointments         Provider Department Appt Notes    In 2 weeks [employer], [address] [place] Px + blood work    In 7 months [address] [facility], Tavon Goodrich [address] [place] [address], [address], [facility] BP reading less than 140/90     BP Readings from Last 1 Encounters:  08/10/22 : 118/68                Passed - EGFRCR or GFRAA > 50     GFR Evaluation  GFRAA: 129 , resulted on 3/11/2022                Future Appointments         Provider Department Appt Notes    In 2 weeks Shaun Carl [employer], [address], [place] Px + blood work    In 7 months Dakota Phi, DO [facility], [address], [place] annual           Recent Outpatient Visits              1 month ago Other non-recurrent acute nonsuppurative otitis media of right ear    [address], [place], APRN    Office Visit    1 month ago Vaginal discharge    [facility], [address], Valentina Buchanan, DO    Office Visit    3 months ago Vaginal discharge    [facility], [address], [place] [id number] [place] [address], Valentina Amezcua, DO    Office Visit    5 months ago Encounter for gynecological examination without abnormal finding    [facility], [address], Valentina Buchanan, DO    Office Visit    8 months ago Vaginal discharge    [address] [place], [place]    Office Visit

## 2022-09-15 NOTE — TELEPHONE ENCOUNTER
Patient is requesting refill of hydrochlorothiazide 12.5 MG Oral Tab.     Pharmacy : [id number] [facility]

## 2022-09-22 ENCOUNTER — OFFICE VISIT (OUTPATIENT)
Dept: FAMILY MEDICINE CLINIC | Facility: CLINIC | Age: 32
End: 2022-09-22

## 2022-09-22 VITALS
HEIGHT: 62 IN | OXYGEN SATURATION: 99 % | BODY MASS INDEX: 27.6 KG/M2 | SYSTOLIC BLOOD PRESSURE: 122 MMHG | RESPIRATION RATE: 16 BRPM | WEIGHT: 150 LBS | DIASTOLIC BLOOD PRESSURE: 60 MMHG | HEART RATE: 90 BPM | TEMPERATURE: 98 F

## 2022-09-22 DIAGNOSIS — J02.9 ACUTE PHARYNGITIS, UNSPECIFIED ETIOLOGY: ICD-10-CM

## 2022-09-22 DIAGNOSIS — J06.9 VIRAL UPPER RESPIRATORY TRACT INFECTION: Primary | ICD-10-CM

## 2022-09-22 LAB
CONTROL LINE PRESENT WITH A CLEAR BACKGROUND (YES/NO): YES YES/NO
KIT LOT #: NORMAL NUMERIC
OPERATOR ID: NORMAL
POCT LOT NUMBER: NORMAL
RAPID SARS-COV-2 BY PCR: NOT DETECTED
STREP GRP A CUL-SCR: NEGATIVE

## 2022-09-22 PROCEDURE — U0002 COVID-19 LAB TEST NON-CDC: HCPCS | Performed by: NURSE PRACTITIONER

## 2022-09-22 PROCEDURE — 3074F SYST BP LT 130 MM HG: CPT | Performed by: NURSE PRACTITIONER

## 2022-09-22 PROCEDURE — 99213 OFFICE O/P EST LOW 20 MIN: CPT | Performed by: NURSE PRACTITIONER

## 2022-09-22 PROCEDURE — 87880 STREP A ASSAY W/OPTIC: CPT | Performed by: NURSE PRACTITIONER

## 2022-09-22 PROCEDURE — 3078F DIAST BP <80 MM HG: CPT | Performed by: NURSE PRACTITIONER

## 2022-09-22 PROCEDURE — 3008F BODY MASS INDEX DOCD: CPT | Performed by: NURSE PRACTITIONER

## 2022-09-22 RX ORDER — BENZONATATE 200 MG/1
200 CAPSULE ORAL 3 TIMES DAILY PRN
Qty: 20 CAPSULE | Refills: 0 | Status: SHIPPED | OUTPATIENT
Start: 2022-09-22

## 2022-09-22 RX ORDER — FLUTICASONE PROPIONATE 50 MCG
2 SPRAY, SUSPENSION (ML) NASAL DAILY
Qty: 1 EACH | Refills: 0 | Status: SHIPPED | OUTPATIENT
Start: 2022-09-22 | End: 2022-10-06

## 2022-09-22 RX ORDER — IBUPROFEN 800 MG/1
800 TABLET ORAL EVERY 8 HOURS PRN
Qty: 20 TABLET | Refills: 0 | Status: SHIPPED | OUTPATIENT
Start: 2022-09-22

## 2022-10-01 ENCOUNTER — OFFICE VISIT (OUTPATIENT)
Dept: FAMILY MEDICINE CLINIC | Facility: CLINIC | Age: 32
End: 2022-10-01
Payer: MEDICAID

## 2022-10-01 ENCOUNTER — LAB ENCOUNTER (OUTPATIENT)
Dept: LAB | Facility: HOSPITAL | Age: 32
End: 2022-10-01
Payer: MEDICAID

## 2022-10-01 VITALS
DIASTOLIC BLOOD PRESSURE: 81 MMHG | WEIGHT: 160 LBS | HEIGHT: 62 IN | OXYGEN SATURATION: 98 % | TEMPERATURE: 98 F | RESPIRATION RATE: 16 BRPM | HEART RATE: 88 BPM | SYSTOLIC BLOOD PRESSURE: 120 MMHG | BODY MASS INDEX: 29.44 KG/M2

## 2022-10-01 DIAGNOSIS — Z00.00 ROUTINE PHYSICAL EXAMINATION: Primary | ICD-10-CM

## 2022-10-01 DIAGNOSIS — Z00.00 ROUTINE PHYSICAL EXAMINATION: ICD-10-CM

## 2022-10-01 DIAGNOSIS — I10 HYPERTENSION, UNSPECIFIED TYPE: ICD-10-CM

## 2022-10-01 DIAGNOSIS — R79.89 ABNORMAL CBC: ICD-10-CM

## 2022-10-01 LAB
ALBUMIN SERPL-MCNC: 3.8 G/DL (ref 3.4–5)
ALBUMIN/GLOB SERPL: 0.9 {RATIO} (ref 1–2)
ALP LIVER SERPL-CCNC: 103 U/L
ALT SERPL-CCNC: 14 U/L
ANION GAP SERPL CALC-SCNC: 5 MMOL/L (ref 0–18)
AST SERPL-CCNC: 8 U/L (ref 15–37)
BASOPHILS # BLD AUTO: 0.03 X10(3) UL (ref 0–0.2)
BASOPHILS NFR BLD AUTO: 0.6 %
BILIRUB SERPL-MCNC: 0.6 MG/DL (ref 0.1–2)
BUN BLD-MCNC: 13 MG/DL (ref 7–18)
BUN/CREAT SERPL: 15.7 (ref 10–20)
CALCIUM BLD-MCNC: 9.3 MG/DL (ref 8.5–10.1)
CHLORIDE SERPL-SCNC: 105 MMOL/L (ref 98–112)
CHOLEST SERPL-MCNC: 224 MG/DL (ref ?–200)
CO2 SERPL-SCNC: 29 MMOL/L (ref 21–32)
CREAT BLD-MCNC: 0.83 MG/DL
DEPRECATED RDW RBC AUTO: 45.2 FL (ref 35.1–46.3)
EOSINOPHIL # BLD AUTO: 0.16 X10(3) UL (ref 0–0.7)
EOSINOPHIL NFR BLD AUTO: 3.1 %
ERYTHROCYTE [DISTWIDTH] IN BLOOD BY AUTOMATED COUNT: 14.2 % (ref 11–15)
FASTING PATIENT LIPID ANSWER: YES
FASTING STATUS PATIENT QL REPORTED: YES
GFR SERPLBLD BASED ON 1.73 SQ M-ARVRAT: 96 ML/MIN/1.73M2 (ref 60–?)
GLOBULIN PLAS-MCNC: 4.2 G/DL (ref 2.8–4.4)
GLUCOSE BLD-MCNC: 83 MG/DL (ref 70–99)
HCT VFR BLD AUTO: 35.7 %
HDLC SERPL-MCNC: 94 MG/DL (ref 40–59)
HGB BLD-MCNC: 11 G/DL
IMM GRANULOCYTES # BLD AUTO: 0.02 X10(3) UL (ref 0–1)
IMM GRANULOCYTES NFR BLD: 0.4 %
LDLC SERPL CALC-MCNC: 122 MG/DL (ref ?–100)
LYMPHOCYTES # BLD AUTO: 2.12 X10(3) UL (ref 1–4)
LYMPHOCYTES NFR BLD AUTO: 40.8 %
MCH RBC QN AUTO: 26.9 PG (ref 26–34)
MCHC RBC AUTO-ENTMCNC: 30.8 G/DL (ref 31–37)
MCV RBC AUTO: 87.3 FL
MONOCYTES # BLD AUTO: 0.41 X10(3) UL (ref 0.1–1)
MONOCYTES NFR BLD AUTO: 7.9 %
NEUTROPHILS # BLD AUTO: 2.46 X10 (3) UL (ref 1.5–7.7)
NEUTROPHILS # BLD AUTO: 2.46 X10(3) UL (ref 1.5–7.7)
NEUTROPHILS NFR BLD AUTO: 47.2 %
NONHDLC SERPL-MCNC: 130 MG/DL (ref ?–130)
OSMOLALITY SERPL CALC.SUM OF ELEC: 287 MOSM/KG (ref 275–295)
PLATELET # BLD AUTO: 340 10(3)UL (ref 150–450)
POTASSIUM SERPL-SCNC: 3.6 MMOL/L (ref 3.5–5.1)
PROT SERPL-MCNC: 8 G/DL (ref 6.4–8.2)
RBC # BLD AUTO: 4.09 X10(6)UL
SODIUM SERPL-SCNC: 139 MMOL/L (ref 136–145)
TRIGL SERPL-MCNC: 48 MG/DL (ref 30–149)
VLDLC SERPL CALC-MCNC: 8 MG/DL (ref 0–30)
WBC # BLD AUTO: 5.2 X10(3) UL (ref 4–11)

## 2022-10-01 PROCEDURE — 3079F DIAST BP 80-89 MM HG: CPT

## 2022-10-01 PROCEDURE — 80061 LIPID PANEL: CPT

## 2022-10-01 PROCEDURE — 36415 COLL VENOUS BLD VENIPUNCTURE: CPT

## 2022-10-01 PROCEDURE — 80053 COMPREHEN METABOLIC PANEL: CPT

## 2022-10-01 PROCEDURE — 83540 ASSAY OF IRON: CPT

## 2022-10-01 PROCEDURE — 3008F BODY MASS INDEX DOCD: CPT

## 2022-10-01 PROCEDURE — 85025 COMPLETE CBC W/AUTO DIFF WBC: CPT

## 2022-10-01 PROCEDURE — 82728 ASSAY OF FERRITIN: CPT

## 2022-10-01 PROCEDURE — 84466 ASSAY OF TRANSFERRIN: CPT

## 2022-10-01 PROCEDURE — 3074F SYST BP LT 130 MM HG: CPT

## 2022-10-01 PROCEDURE — 99385 PREV VISIT NEW AGE 18-39: CPT

## 2022-10-01 RX ORDER — HYDROCHLOROTHIAZIDE 12.5 MG/1
12.5 TABLET ORAL DAILY
Qty: 90 TABLET | Refills: 1 | Status: SHIPPED | OUTPATIENT
Start: 2022-10-01

## 2022-10-04 LAB
DEPRECATED HBV CORE AB SER IA-ACNC: 4.6 NG/ML
IRON SATN MFR SERPL: 5 %
IRON SERPL-MCNC: 23 UG/DL
TIBC SERPL-MCNC: 434 UG/DL (ref 240–450)
TRANSFERRIN SERPL-MCNC: 291 MG/DL (ref 200–360)

## 2022-10-09 PROBLEM — Z00.00 ROUTINE PHYSICAL EXAMINATION: Status: ACTIVE | Noted: 2022-10-09

## 2022-10-09 PROBLEM — I10 HYPERTENSION: Status: ACTIVE | Noted: 2022-10-09

## 2022-12-08 ENCOUNTER — TELEPHONE (OUTPATIENT)
Dept: OBGYN CLINIC | Facility: CLINIC | Age: 32
End: 2022-12-08

## 2022-12-08 RX ORDER — FLUCONAZOLE 150 MG/1
150 TABLET ORAL
Qty: 2 TABLET | Refills: 0 | Status: SHIPPED | OUTPATIENT
Start: 2022-12-08 | End: 2022-12-12

## 2022-12-08 NOTE — TELEPHONE ENCOUNTER
Pt calling with cottage cheese discharge, vulvar burning and dryness x a few days. Hx of yeast. She notices these symptoms routinely come on the few days before her menses. Menses due in 2 days. She declines recs to try monistat 7 OTC. States monistat does not work for her. She is asking for 2 doses of diflucan. States she is agreeable to come in, but would prefer to treat without appt d/t recurrent yeast. She is also asking if any preventative measures recommended, given yeast occurs at same time of cycle. To JEANA- please advise on pt questions.

## 2022-12-12 ENCOUNTER — OFFICE VISIT (OUTPATIENT)
Dept: FAMILY MEDICINE CLINIC | Facility: CLINIC | Age: 32
End: 2022-12-12
Payer: MEDICAID

## 2022-12-12 VITALS
HEIGHT: 62 IN | SYSTOLIC BLOOD PRESSURE: 119 MMHG | OXYGEN SATURATION: 100 % | DIASTOLIC BLOOD PRESSURE: 89 MMHG | TEMPERATURE: 99 F | BODY MASS INDEX: 27.79 KG/M2 | HEART RATE: 87 BPM | RESPIRATION RATE: 16 BRPM | WEIGHT: 151 LBS

## 2022-12-12 DIAGNOSIS — U07.1 POSITIVE SELF-ADMINISTERED ANTIGEN TEST FOR COVID-19: Primary | ICD-10-CM

## 2022-12-12 PROCEDURE — 87637 SARSCOV2&INF A&B&RSV AMP PRB: CPT | Performed by: PHYSICIAN ASSISTANT

## 2022-12-12 RX ORDER — NIRMATRELVIR AND RITONAVIR 300-100 MG
KIT ORAL
Qty: 30 TABLET | Refills: 0 | Status: SHIPPED | OUTPATIENT
Start: 2022-12-12 | End: 2022-12-17

## 2022-12-13 ENCOUNTER — TELEPHONE (OUTPATIENT)
Dept: OBGYN CLINIC | Facility: CLINIC | Age: 32
End: 2022-12-13

## 2022-12-13 LAB
FLUAV + FLUBV RNA SPEC NAA+PROBE: NOT DETECTED
FLUAV + FLUBV RNA SPEC NAA+PROBE: NOT DETECTED
RSV RNA SPEC NAA+PROBE: NOT DETECTED
SARS-COV-2 RNA RESP QL NAA+PROBE: DETECTED

## 2022-12-13 NOTE — TELEPHONE ENCOUNTER
Pt was given medication for covid. Pt wondering if it is safe to take while breastfeeding. Message to on call doctor for recs.

## 2022-12-13 NOTE — TELEPHONE ENCOUNTER
There is no data on whether or not this medication is safe in pregnancy. Is a matter of weighing risks with benefits. Depending on her severity of covid symptoms and if she would like to use the medication, she could pump and discard the milk during those days since there is no data on use during breastfeeding.

## 2022-12-13 NOTE — TELEPHONE ENCOUNTER
Pt informed of GIORGIO's response below. Pt states her daughter is 2 and she only breastfeeds at night. Pt states she is not having severe symptoms, at this time. Pt states she will not take this medication and will manage symptoms.

## 2023-01-24 ENCOUNTER — LAB ENCOUNTER (OUTPATIENT)
Dept: LAB | Age: 33
End: 2023-01-24
Payer: MEDICAID

## 2023-01-24 ENCOUNTER — OFFICE VISIT (OUTPATIENT)
Dept: FAMILY MEDICINE CLINIC | Facility: CLINIC | Age: 33
End: 2023-01-24

## 2023-01-24 VITALS
RESPIRATION RATE: 16 BRPM | OXYGEN SATURATION: 98 % | TEMPERATURE: 99 F | WEIGHT: 156 LBS | BODY MASS INDEX: 28.71 KG/M2 | HEART RATE: 96 BPM | DIASTOLIC BLOOD PRESSURE: 80 MMHG | HEIGHT: 62 IN | SYSTOLIC BLOOD PRESSURE: 124 MMHG

## 2023-01-24 DIAGNOSIS — Z11.3 SCREEN FOR STD (SEXUALLY TRANSMITTED DISEASE): ICD-10-CM

## 2023-01-24 DIAGNOSIS — D50.9 IRON DEFICIENCY ANEMIA, UNSPECIFIED IRON DEFICIENCY ANEMIA TYPE: ICD-10-CM

## 2023-01-24 DIAGNOSIS — N89.8 VAGINAL DISCHARGE: ICD-10-CM

## 2023-01-24 DIAGNOSIS — E78.5 HYPERLIPIDEMIA, UNSPECIFIED HYPERLIPIDEMIA TYPE: ICD-10-CM

## 2023-01-24 DIAGNOSIS — Z87.42 HISTORY OF VAGINAL INFECTION: ICD-10-CM

## 2023-01-24 DIAGNOSIS — N89.8 VAGINAL DISCHARGE: Primary | ICD-10-CM

## 2023-01-24 LAB
BASOPHILS # BLD AUTO: 0.04 X10(3) UL (ref 0–0.2)
BASOPHILS NFR BLD AUTO: 1 %
CHOLEST SERPL-MCNC: 201 MG/DL (ref ?–200)
DEPRECATED HBV CORE AB SER IA-ACNC: 6.8 NG/ML
DEPRECATED RDW RBC AUTO: 42.5 FL (ref 35.1–46.3)
EOSINOPHIL # BLD AUTO: 0.05 X10(3) UL (ref 0–0.7)
EOSINOPHIL NFR BLD AUTO: 1.2 %
ERYTHROCYTE [DISTWIDTH] IN BLOOD BY AUTOMATED COUNT: 15.6 % (ref 11–15)
EST. AVERAGE GLUCOSE BLD GHB EST-MCNC: 105 MG/DL (ref 68–126)
FASTING PATIENT LIPID ANSWER: YES
HAV IGM SER QL: NONREACTIVE
HBA1C MFR BLD: 5.3 % (ref ?–5.7)
HBV CORE IGM SER QL: NONREACTIVE
HBV SURFACE AG SERPL QL IA: NONREACTIVE
HCT VFR BLD AUTO: 34 %
HCV AB SERPL QL IA: NONREACTIVE
HDLC SERPL-MCNC: 84 MG/DL (ref 40–59)
HGB BLD-MCNC: 11.2 G/DL
IMM GRANULOCYTES # BLD AUTO: 0 X10(3) UL (ref 0–1)
IMM GRANULOCYTES NFR BLD: 0 %
LDLC SERPL CALC-MCNC: 109 MG/DL (ref ?–100)
LYMPHOCYTES # BLD AUTO: 1.66 X10(3) UL (ref 1–4)
LYMPHOCYTES NFR BLD AUTO: 40.1 %
MCH RBC QN AUTO: 27.1 PG (ref 26–34)
MCHC RBC AUTO-ENTMCNC: 32.9 G/DL (ref 31–37)
MCV RBC AUTO: 82.1 FL
MONOCYTES # BLD AUTO: 0.32 X10(3) UL (ref 0.1–1)
MONOCYTES NFR BLD AUTO: 7.7 %
NEUTROPHILS # BLD AUTO: 2.07 X10 (3) UL (ref 1.5–7.7)
NEUTROPHILS # BLD AUTO: 2.07 X10(3) UL (ref 1.5–7.7)
NEUTROPHILS NFR BLD AUTO: 50 %
NONHDLC SERPL-MCNC: 117 MG/DL (ref ?–130)
PLATELET # BLD AUTO: 346 10(3)UL (ref 150–450)
RBC # BLD AUTO: 4.14 X10(6)UL
TRIGL SERPL-MCNC: 40 MG/DL (ref 30–149)
VLDLC SERPL CALC-MCNC: 7 MG/DL (ref 0–30)
WBC # BLD AUTO: 4.1 X10(3) UL (ref 4–11)

## 2023-01-24 PROCEDURE — 80061 LIPID PANEL: CPT

## 2023-01-24 PROCEDURE — 3008F BODY MASS INDEX DOCD: CPT

## 2023-01-24 PROCEDURE — 87591 N.GONORRHOEAE DNA AMP PROB: CPT

## 2023-01-24 PROCEDURE — 80074 ACUTE HEPATITIS PANEL: CPT

## 2023-01-24 PROCEDURE — 86780 TREPONEMA PALLIDUM: CPT

## 2023-01-24 PROCEDURE — 83036 HEMOGLOBIN GLYCOSYLATED A1C: CPT

## 2023-01-24 PROCEDURE — 99213 OFFICE O/P EST LOW 20 MIN: CPT

## 2023-01-24 PROCEDURE — 87389 HIV-1 AG W/HIV-1&-2 AB AG IA: CPT

## 2023-01-24 PROCEDURE — 82728 ASSAY OF FERRITIN: CPT

## 2023-01-24 PROCEDURE — 85025 COMPLETE CBC W/AUTO DIFF WBC: CPT

## 2023-01-24 PROCEDURE — 3074F SYST BP LT 130 MM HG: CPT

## 2023-01-24 PROCEDURE — 36415 COLL VENOUS BLD VENIPUNCTURE: CPT

## 2023-01-24 PROCEDURE — 3079F DIAST BP 80-89 MM HG: CPT

## 2023-01-24 PROCEDURE — 87491 CHLMYD TRACH DNA AMP PROBE: CPT

## 2023-01-26 LAB
GENITAL VAGINOSIS SCREEN: POSITIVE
TRICHOMONAS SCREEN: NEGATIVE

## 2023-01-31 ENCOUNTER — OFFICE VISIT (OUTPATIENT)
Dept: FAMILY MEDICINE CLINIC | Facility: CLINIC | Age: 33
End: 2023-01-31

## 2023-01-31 VITALS
SYSTOLIC BLOOD PRESSURE: 117 MMHG | DIASTOLIC BLOOD PRESSURE: 81 MMHG | BODY MASS INDEX: 31.22 KG/M2 | HEART RATE: 81 BPM | RESPIRATION RATE: 18 BRPM | WEIGHT: 159 LBS | HEIGHT: 60 IN | TEMPERATURE: 98 F

## 2023-01-31 DIAGNOSIS — E66.3 OVERWEIGHT FOR HEIGHT: Primary | ICD-10-CM

## 2023-01-31 PROCEDURE — 99213 OFFICE O/P EST LOW 20 MIN: CPT | Performed by: FAMILY MEDICINE

## 2023-01-31 PROCEDURE — 3008F BODY MASS INDEX DOCD: CPT | Performed by: FAMILY MEDICINE

## 2023-01-31 PROCEDURE — 3079F DIAST BP 80-89 MM HG: CPT | Performed by: FAMILY MEDICINE

## 2023-01-31 PROCEDURE — 3074F SYST BP LT 130 MM HG: CPT | Performed by: FAMILY MEDICINE

## 2023-02-06 DIAGNOSIS — E78.00 ELEVATED CHOLESTEROL: Primary | ICD-10-CM

## 2023-04-19 ENCOUNTER — OFFICE VISIT (OUTPATIENT)
Dept: OBGYN CLINIC | Facility: CLINIC | Age: 33
End: 2023-04-19

## 2023-04-19 ENCOUNTER — LAB ENCOUNTER (OUTPATIENT)
Dept: LAB | Facility: HOSPITAL | Age: 33
End: 2023-04-19
Attending: OBSTETRICS & GYNECOLOGY
Payer: MEDICAID

## 2023-04-19 VITALS
DIASTOLIC BLOOD PRESSURE: 80 MMHG | WEIGHT: 151.63 LBS | SYSTOLIC BLOOD PRESSURE: 125 MMHG | BODY MASS INDEX: 30 KG/M2 | HEART RATE: 101 BPM

## 2023-04-19 DIAGNOSIS — Z01.419 ENCOUNTER FOR GYNECOLOGICAL EXAMINATION WITHOUT ABNORMAL FINDING: ICD-10-CM

## 2023-04-19 DIAGNOSIS — D50.9 IRON DEFICIENCY ANEMIA, UNSPECIFIED IRON DEFICIENCY ANEMIA TYPE: ICD-10-CM

## 2023-04-19 DIAGNOSIS — Z11.3 SCREENING EXAMINATION FOR VENEREAL DISEASE: Primary | ICD-10-CM

## 2023-04-19 DIAGNOSIS — Z11.3 SCREENING EXAMINATION FOR VENEREAL DISEASE: ICD-10-CM

## 2023-04-19 LAB
BASOPHILS # BLD AUTO: 0.06 X10(3) UL (ref 0–0.2)
BASOPHILS NFR BLD AUTO: 1.1 %
DEPRECATED HBV CORE AB SER IA-ACNC: 6.2 NG/ML
DEPRECATED RDW RBC AUTO: 46.6 FL (ref 35.1–46.3)
EOSINOPHIL # BLD AUTO: 0.17 X10(3) UL (ref 0–0.7)
EOSINOPHIL NFR BLD AUTO: 3.1 %
ERYTHROCYTE [DISTWIDTH] IN BLOOD BY AUTOMATED COUNT: 16.4 % (ref 11–15)
HBV SURFACE AG SER-ACNC: <0.1 [IU]/L
HBV SURFACE AG SERPL QL IA: NONREACTIVE
HCT VFR BLD AUTO: 31.7 %
HCV AB SERPL QL IA: NONREACTIVE
HGB BLD-MCNC: 9.7 G/DL
IMM GRANULOCYTES # BLD AUTO: 0.01 X10(3) UL (ref 0–1)
IMM GRANULOCYTES NFR BLD: 0.2 %
LYMPHOCYTES # BLD AUTO: 2.12 X10(3) UL (ref 1–4)
LYMPHOCYTES NFR BLD AUTO: 38.8 %
MCH RBC QN AUTO: 23.7 PG (ref 26–34)
MCHC RBC AUTO-ENTMCNC: 30.6 G/DL (ref 31–37)
MCV RBC AUTO: 77.3 FL
MONOCYTES # BLD AUTO: 0.4 X10(3) UL (ref 0.1–1)
MONOCYTES NFR BLD AUTO: 7.3 %
NEUTROPHILS # BLD AUTO: 2.71 X10 (3) UL (ref 1.5–7.7)
NEUTROPHILS # BLD AUTO: 2.71 X10(3) UL (ref 1.5–7.7)
NEUTROPHILS NFR BLD AUTO: 49.5 %
PLATELET # BLD AUTO: 364 10(3)UL (ref 150–450)
RBC # BLD AUTO: 4.1 X10(6)UL
WBC # BLD AUTO: 5.5 X10(3) UL (ref 4–11)

## 2023-04-19 PROCEDURE — 87661 TRICHOMONAS VAGINALIS AMPLIF: CPT | Performed by: OBSTETRICS & GYNECOLOGY

## 2023-04-19 PROCEDURE — 87491 CHLMYD TRACH DNA AMP PROBE: CPT | Performed by: OBSTETRICS & GYNECOLOGY

## 2023-04-19 PROCEDURE — 87591 N.GONORRHOEAE DNA AMP PROB: CPT | Performed by: OBSTETRICS & GYNECOLOGY

## 2023-04-19 PROCEDURE — 85025 COMPLETE CBC W/AUTO DIFF WBC: CPT

## 2023-04-19 PROCEDURE — 87340 HEPATITIS B SURFACE AG IA: CPT

## 2023-04-19 PROCEDURE — 87389 HIV-1 AG W/HIV-1&-2 AB AG IA: CPT

## 2023-04-19 PROCEDURE — 36415 COLL VENOUS BLD VENIPUNCTURE: CPT

## 2023-04-19 PROCEDURE — 86780 TREPONEMA PALLIDUM: CPT

## 2023-04-19 PROCEDURE — 82728 ASSAY OF FERRITIN: CPT

## 2023-04-19 PROCEDURE — 86803 HEPATITIS C AB TEST: CPT

## 2023-04-20 LAB
C TRACH DNA SPEC QL NAA+PROBE: NEGATIVE
N GONORRHOEA DNA SPEC QL NAA+PROBE: NEGATIVE

## 2023-04-21 LAB
T PALLIDUM AB SER QL: NEGATIVE
T VAGINALIS RRNA SPEC QL NAA+PROBE: NEGATIVE

## 2023-04-24 ENCOUNTER — HOSPITAL ENCOUNTER (OUTPATIENT)
Age: 33
Discharge: HOME | End: 2023-04-24
Payer: MEDICAID

## 2023-04-24 VITALS
OXYGEN SATURATION: 98 % | BODY MASS INDEX: 29 KG/M2 | SYSTOLIC BLOOD PRESSURE: 122 MMHG | DIASTOLIC BLOOD PRESSURE: 84 MMHG | HEART RATE: 85 BPM | TEMPERATURE: 98 F | RESPIRATION RATE: 18 BRPM | WEIGHT: 146 LBS

## 2023-04-24 DIAGNOSIS — M54.50 ACUTE RIGHT-SIDED LOW BACK PAIN WITHOUT SCIATICA: Primary | ICD-10-CM

## 2023-04-24 LAB
B-HCG UR QL: NEGATIVE
BILIRUB UR QL STRIP: NEGATIVE
COLOR UR: YELLOW
GLUCOSE UR STRIP-MCNC: NEGATIVE MG/DL
HGB UR QL STRIP: NEGATIVE
KETONES UR STRIP-MCNC: NEGATIVE MG/DL
LEUKOCYTE ESTERASE UR QL STRIP: NEGATIVE
NITRITE UR QL STRIP: NEGATIVE
PH UR STRIP: 5.5 [PH]
PROT UR STRIP-MCNC: NEGATIVE MG/DL
SP GR UR STRIP: 1.02
UROBILINOGEN UR STRIP-ACNC: <2 MG/DL

## 2023-04-24 PROCEDURE — 81025 URINE PREGNANCY TEST: CPT | Performed by: NURSE PRACTITIONER

## 2023-04-24 PROCEDURE — 99213 OFFICE O/P EST LOW 20 MIN: CPT | Performed by: NURSE PRACTITIONER

## 2023-04-24 PROCEDURE — 81002 URINALYSIS NONAUTO W/O SCOPE: CPT | Performed by: NURSE PRACTITIONER

## 2023-04-24 NOTE — DISCHARGE INSTRUCTIONS
Use heating pad to back area of pain. A culture was sent-IF positive you'll get a phone call. IF you develop worsening urinary symptoms, fever, nausea, vomiting, return for further evaluation.

## 2023-04-24 NOTE — ED INITIAL ASSESSMENT (HPI)
Pt states she has right lower back pain and pressure with urination. Pt states she has had a UTI in the past and this feels similar.

## 2023-04-25 ENCOUNTER — OFFICE VISIT (OUTPATIENT)
Dept: FAMILY MEDICINE CLINIC | Facility: CLINIC | Age: 33
End: 2023-04-25

## 2023-04-25 VITALS
TEMPERATURE: 99 F | RESPIRATION RATE: 16 BRPM | OXYGEN SATURATION: 100 % | DIASTOLIC BLOOD PRESSURE: 68 MMHG | WEIGHT: 146 LBS | BODY MASS INDEX: 28.66 KG/M2 | HEART RATE: 82 BPM | HEIGHT: 60 IN | SYSTOLIC BLOOD PRESSURE: 118 MMHG

## 2023-04-25 DIAGNOSIS — D50.9 IRON DEFICIENCY ANEMIA, UNSPECIFIED IRON DEFICIENCY ANEMIA TYPE: ICD-10-CM

## 2023-04-25 DIAGNOSIS — I10 HYPERTENSION, UNSPECIFIED TYPE: ICD-10-CM

## 2023-04-25 DIAGNOSIS — N89.8 VAGINAL DISCHARGE: Primary | ICD-10-CM

## 2023-04-25 PROCEDURE — 3074F SYST BP LT 130 MM HG: CPT

## 2023-04-25 PROCEDURE — 99213 OFFICE O/P EST LOW 20 MIN: CPT

## 2023-04-25 PROCEDURE — 3008F BODY MASS INDEX DOCD: CPT

## 2023-04-25 PROCEDURE — 3078F DIAST BP <80 MM HG: CPT

## 2023-04-25 RX ORDER — METRONIDAZOLE 500 MG/1
500 TABLET ORAL 2 TIMES DAILY
Qty: 14 TABLET | Refills: 0 | Status: SHIPPED | OUTPATIENT
Start: 2023-04-25 | End: 2023-05-02

## 2023-04-25 RX ORDER — HYDROCHLOROTHIAZIDE 12.5 MG/1
12.5 TABLET ORAL DAILY
Qty: 90 TABLET | Refills: 1 | Status: SHIPPED | OUTPATIENT
Start: 2023-04-25

## 2023-04-25 RX ORDER — DOCUSATE SODIUM 100 MG/1
100 CAPSULE, LIQUID FILLED ORAL DAILY PRN
Qty: 30 CAPSULE | Refills: 0 | Status: SHIPPED | OUTPATIENT
Start: 2023-04-25

## 2023-04-25 RX ORDER — FLUCONAZOLE 150 MG/1
150 TABLET ORAL ONCE
Qty: 2 TABLET | Refills: 0 | Status: SHIPPED | OUTPATIENT
Start: 2023-04-25 | End: 2023-04-25

## 2023-04-25 RX ORDER — FERROUS SULFATE 325(65) MG
325 TABLET ORAL
Qty: 30 TABLET | Refills: 0 | Status: SHIPPED | OUTPATIENT
Start: 2023-04-25

## 2023-04-26 ENCOUNTER — TELEPHONE (OUTPATIENT)
Dept: FAMILY MEDICINE CLINIC | Facility: CLINIC | Age: 33
End: 2023-04-26

## 2023-04-26 NOTE — TELEPHONE ENCOUNTER
Spoke with the patient,verified full name and     Informed her of message stated already  medication and will call back if needed.

## 2023-04-26 NOTE — TELEPHONE ENCOUNTER
Please inform patient that there was an error in the vaginal swab collected. I already sent treatment to the pharmacy for yeast and bacterial vaginosis. Advise patient to return to office if vaginal discharge does not improve after treatment.

## 2023-04-26 NOTE — TELEPHONE ENCOUNTER
Rita from [facility] lab calling to state that genital vaginosis screen specimen collected by Bessie Castro on 04/26/23 was collected incorrectly. She states it was selected in dry BBL swab when it was supposed to be collected using sponge inside. She states that new orders need to be placed and specimen needs to be collected again by provider if provider would like this test completed.

## 2023-05-15 ENCOUNTER — PATIENT MESSAGE (OUTPATIENT)
Dept: FAMILY MEDICINE CLINIC | Facility: CLINIC | Age: 33
End: 2023-05-15

## 2023-05-16 ENCOUNTER — TELEPHONE (OUTPATIENT)
Dept: FAMILY MEDICINE CLINIC | Facility: CLINIC | Age: 33
End: 2023-05-16

## 2023-05-16 RX ORDER — METRONIDAZOLE 500 MG/1
500 TABLET ORAL 2 TIMES DAILY
Qty: 14 TABLET | Refills: 0 | Status: SHIPPED | OUTPATIENT
Start: 2023-05-16 | End: 2023-05-23

## 2023-05-16 RX ORDER — FLUCONAZOLE 150 MG/1
150 TABLET ORAL ONCE
Qty: 2 TABLET | Refills: 0 | Status: SHIPPED | OUTPATIENT
Start: 2023-05-16 | End: 2023-05-16

## 2023-05-16 NOTE — TELEPHONE ENCOUNTER
Last visit 4/25/23. [employer] message sent to patient instructing to call nurse triage to speak directly to a nurse regarding symptoms as per department protocol.     Future Appointments   Date Time Provider Joelle Bowling   10/5/2023  6:00 PM Daniel Holman MD [facility]   4/24/2024  5:20 PM Yovany Guthrie DO [facility]

## 2023-05-16 NOTE — TELEPHONE ENCOUNTER
Metronidazole and Fluconazole sent to pharmacy.    Advise patient to make office visit if vaginal discharge does not improve

## 2023-06-02 NOTE — TELEPHONE ENCOUNTER
Patient reports appt with Dr Mindy Son on 11/11 for rash, was given medrol dosepak that did help rash go away. Yesterday started with rash to her lower back, appears like hives, reports rash was due to use of a different laundry detergent.  She's currently using Pre-procedure checklist reviewed.

## 2023-08-24 ENCOUNTER — MED REC SCAN ONLY (OUTPATIENT)
Dept: FAMILY MEDICINE CLINIC | Facility: CLINIC | Age: 33
End: 2023-08-24

## 2023-08-24 ENCOUNTER — DOCUMENTATION ONLY (OUTPATIENT)
Dept: FAMILY MEDICINE CLINIC | Facility: CLINIC | Age: 33
End: 2023-08-24
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
## 2023-09-18 ENCOUNTER — DOCUMENTATION ONLY (OUTPATIENT)
Dept: FAMILY MEDICINE CLINIC | Facility: CLINIC | Age: 33
End: 2023-09-18

## 2023-09-18 ENCOUNTER — MED REC SCAN ONLY (OUTPATIENT)
Dept: FAMILY MEDICINE CLINIC | Facility: CLINIC | Age: 33
End: 2023-09-18

## 2023-10-05 DIAGNOSIS — I10 HYPERTENSION, UNSPECIFIED TYPE: ICD-10-CM

## 2023-10-08 NOTE — TELEPHONE ENCOUNTER
Please review. Protocol failed / No Protocol. Requested Prescriptions   Pending Prescriptions Disp Refills    hydroCHLOROthiazide 12.5 MG Oral Tab 90 tablet 1     Sig: Take 1 tablet (12.5 mg total) by mouth daily. Hypertensive Medications Protocol Failed - 10/5/2023  6:53 PM        Failed - CMP or BMP in past 6 months     No results found for this or any previous visit (from the past 4392 hour(s)).             Failed - EGFRCR or GFRAA > 50     GFR Evaluation            Passed - In person appointment in the past 12 or next 3 months     Recent Outpatient Visits              5 months ago Vaginal discharge    [address], [address] Gustavo Ponce [place], APRN    Office Visit    5 months ago Screening examination for venereal disease    [facility], [address], . [address], Madlyn Jeans, DO    Office Visit    8 months ago Overweight for height    Dayton Cuba MD    Office Visit    8 months ago Vaginal discharge    [address] [facility] [place]    Office Visit    10 months ago Positive self-administered antigen test for [employer], [facility] [employer], [facility] [address] [place], [place]    Office Visit          Future Appointments         Provider Department Appt Notes    In 6 months Cortez Pina DO [address], [address], [facility] annual    In 1 year Sheila Fernandez MD [address], [address] [facility] [place]                     Passed - Last BP reading less than 140/90     BP Readings from Last 1 Encounters:  04/25/23 : Yogi Cash - In person appointment or virtual visit in the past 6 months     Recent Outpatient Visits              5 months ago Vaginal discharge    [address], [address] [place], [place], [place], [facility]    Office Visit    5 months ago Screening examination for venereal disease    [facility], [address], [place] - [facility], Valentina Amezcua DO    Office Visit    8 months ago Overweight for height    Dakota Murdock MD    Office Visit    8 months ago Vaginal discharge    [address] [facility] [place] Shaun Carl, APRN    Office Visit    10 months ago Positive self-administered antigen test for [employer], [facility] [employer], [facility] [address], [address] [place], [place]    Office Visit          Future Appointments         Provider Department Appt Notes    In 6 months Dakota Warren, DO Kaylynn Glasgow, [address], [address] [facility] annual    In 1 year [facility]

## 2023-10-09 RX ORDER — HYDROCHLOROTHIAZIDE 12.5 MG/1
12.5 TABLET ORAL DAILY
Qty: 90 TABLET | Refills: 1 | Status: SHIPPED | OUTPATIENT
Start: 2023-10-09

## 2023-11-03 ENCOUNTER — OFFICE VISIT (OUTPATIENT)
Dept: FAMILY MEDICINE CLINIC | Facility: CLINIC | Age: 33
End: 2023-11-03
Payer: MEDICAID

## 2023-11-03 ENCOUNTER — NURSE TRIAGE (OUTPATIENT)
Dept: FAMILY MEDICINE CLINIC | Facility: CLINIC | Age: 33
End: 2023-11-03

## 2023-11-03 VITALS
HEART RATE: 90 BPM | TEMPERATURE: 99 F | SYSTOLIC BLOOD PRESSURE: 122 MMHG | RESPIRATION RATE: 16 BRPM | HEIGHT: 60 IN | BODY MASS INDEX: 29.84 KG/M2 | DIASTOLIC BLOOD PRESSURE: 66 MMHG | OXYGEN SATURATION: 98 % | WEIGHT: 152 LBS

## 2023-11-03 DIAGNOSIS — N89.8 VAGINAL DISCHARGE: Primary | ICD-10-CM

## 2023-11-03 DIAGNOSIS — N89.8 ITCHING IN THE VAGINAL AREA: ICD-10-CM

## 2023-11-03 LAB
CONTROL LINE PRESENT WITH A CLEAR BACKGROUND (YES/NO): YES YES/NO
PREGNANCY TEST, URINE: NEGATIVE

## 2023-11-03 PROCEDURE — 3078F DIAST BP <80 MM HG: CPT | Performed by: PHYSICIAN ASSISTANT

## 2023-11-03 PROCEDURE — 3008F BODY MASS INDEX DOCD: CPT | Performed by: PHYSICIAN ASSISTANT

## 2023-11-03 PROCEDURE — 3074F SYST BP LT 130 MM HG: CPT | Performed by: PHYSICIAN ASSISTANT

## 2023-11-03 PROCEDURE — 87591 N.GONORRHOEAE DNA AMP PROB: CPT | Performed by: PHYSICIAN ASSISTANT

## 2023-11-03 PROCEDURE — 99213 OFFICE O/P EST LOW 20 MIN: CPT | Performed by: PHYSICIAN ASSISTANT

## 2023-11-03 PROCEDURE — 81025 URINE PREGNANCY TEST: CPT | Performed by: PHYSICIAN ASSISTANT

## 2023-11-03 PROCEDURE — 87808 TRICHOMONAS ASSAY W/OPTIC: CPT | Performed by: PHYSICIAN ASSISTANT

## 2023-11-03 PROCEDURE — 87205 SMEAR GRAM STAIN: CPT | Performed by: PHYSICIAN ASSISTANT

## 2023-11-03 PROCEDURE — 87106 FUNGI IDENTIFICATION YEAST: CPT | Performed by: PHYSICIAN ASSISTANT

## 2023-11-03 PROCEDURE — 87491 CHLMYD TRACH DNA AMP PROBE: CPT | Performed by: PHYSICIAN ASSISTANT

## 2023-11-03 RX ORDER — FLUCONAZOLE 150 MG/1
150 TABLET ORAL ONCE
Qty: 2 TABLET | Refills: 0 | Status: SHIPPED | OUTPATIENT
Start: 2023-11-03 | End: 2023-11-03

## 2023-11-03 NOTE — TELEPHONE ENCOUNTER
Action Requested: Summary for Provider     []  Critical Lab, Recommendations Needed  [] Need Additional Advice  []   FYI    []   Need Orders  [] Need Medications Sent to Pharmacy  []  Other     SUMMARY: Patient prefers to be seen today and do the STD screening test, there is no available openings today , instructed to go to   and agreed, home care provided. Reason for call: Vaginal Discharge  Onset: Data Unavailable      Hx frequent vaginal discharges===see encounters . Symptoms x 3 days now ==vaginal discharges whitish color, clumpy and thick but now thin, no odor, no bleeding, itchy, dryness, then moist, LMP=10/19/2023, denied pregnant, sexually active, uses protection. Stated that she noticed her symptoms usually before or after her menstrual cycle.          Reason for Disposition   Symptoms of a yeast infection' (i.e., itchy, white discharge, not bad smelling) and not improved > 3 days following Care Advice    Protocols used: Vaginal Vonsjknsb-B-OL

## 2023-11-06 LAB
C TRACH DNA SPEC QL NAA+PROBE: NEGATIVE
GENITAL VAGINOSIS SCREEN: NEGATIVE
N GONORRHOEA DNA SPEC QL NAA+PROBE: NEGATIVE
TRICHOMONAS SCREEN: NEGATIVE

## 2023-11-10 NOTE — PROGRESS NOTES
Medardo Lozano. You have negative pap and HPV test. Continue yearly exam and do pap in 3 years. 1.52

## 2024-04-29 ENCOUNTER — OFFICE VISIT (OUTPATIENT)
Dept: OBGYN CLINIC | Facility: CLINIC | Age: 34
End: 2024-04-29

## 2024-04-29 VITALS
WEIGHT: 158 LBS | BODY MASS INDEX: 31 KG/M2 | DIASTOLIC BLOOD PRESSURE: 79 MMHG | SYSTOLIC BLOOD PRESSURE: 123 MMHG | HEART RATE: 75 BPM

## 2024-04-29 DIAGNOSIS — Z11.3 SCREENING EXAMINATION FOR STI: ICD-10-CM

## 2024-04-29 DIAGNOSIS — Z12.4 SCREENING FOR CERVICAL CANCER: ICD-10-CM

## 2024-04-29 DIAGNOSIS — Z97.5 PRESENCE OF IUD: ICD-10-CM

## 2024-04-29 DIAGNOSIS — Z01.419 WELL WOMAN EXAM WITH ROUTINE GYNECOLOGICAL EXAM: Primary | ICD-10-CM

## 2024-04-29 PROCEDURE — 99395 PREV VISIT EST AGE 18-39: CPT | Performed by: NURSE PRACTITIONER

## 2024-04-29 NOTE — PROGRESS NOTES
[facility]    Obstetrics and Gynecology    Chief Complaint   Patient presents with    Gyn Exam     ANNUAL EXAM        Blake Hall is a 33 year old female  Patient's last menstrual period was 2024 (exact date). presenting for annual gynecology exam.  Last seen a year ago.  Periods every month, lasts 7 days, paragard placed 10/2020. She states has had heavy periods since paragard IUD placement for 4 days - changes 6-8 pads on heaviest day for 4 days. Severe headaches prior to periods and during period - midol helps. She is not bothered by symptoms, doesn't want to take hormones.  Some fatigue on menses.  Takes a prenatal with iron. Recent hgb in .4    She is sexually active with her partner x 15 years.  Stopped breastfeeding in February.    Pap:3/2021 NILM, neg HPV   Contraception:paragard IUD  Mammo: none    OBSTETRICS HISTORY:  OB History    Para Term  AB Living   3 2 2 0 1 2   SAB IAB Ectopic Multiple Live Births   0 0 0 0 2       GYNE HISTORY:  Menarche: 12 (2024  1:06 PM)  Period Cycle (Days): 28 (2024  1:06 PM)  Period Duration (Days): 7 DAYS (2024  1:06 PM)  Period Flow: Heavy (2024  1:06 PM)  Use of Birth Control (if yes, specify type): Paraguard IUD (2024  1:06 PM)  Hx Prior Abnormal Pap: No (2024  1:06 PM)  Pap Date: 21 (2024  1:06 PM)  Pap Result Notes: Pap neg, HPV neg / Dexa 5-19-17 (2024  1:06 PM)      History   Sexual Activity    Sexual activity: Yes    Partners: Male    Birth control/ protection: I.U.D.           Latest Ref Rng & Units 3/1/2021    12:04 PM 2018     9:46 AM   RECENT PAP RESULTS   Thinprep Pap Negative for intraepithelial lesion or malignancy Negative for intraepithelial lesion or malignancy  Negative for intraepithelial lesion or malignancy    HPV Negative Negative           MEDICAL HISTORY:  Past Medical History:    Cornea abrasion    Chemical abrasion inferior cornea, OD    Cup  to disc asymmetry    OU    History of abnormal cervical Pap smear    History of pregnancy    2009-induced ; 2014-\"Prince Danielle Motley\" 1st degree perineal laceration - vaginal wall. Mishra was a full term live born 7 pound 9 ounce male delivered by [facility].    Subjective visual disturbance    OU     Past Surgical History:   Procedure Laterality Date    Colposcopy, cervix w/upper adjacent vagina; w/biopsy(s), cervix        2014    Oral surgery procedure  2007    wisdom teeth extraction       SOCIAL HISTORY:  Social History     Socioeconomic History    Marital status: Single     Spouse name: Not on file    Number of children: Not on file    Years of education: Not on file    Highest education level: Not on file   Occupational History    Not on file   Tobacco Use    Smoking status: Never    Smokeless tobacco: Never   Vaping Use    Vaping status: Never Used   Substance and Sexual Activity    Alcohol use: No     Alcohol/week: 0.0 standard drinks of alcohol     Comment: none    Drug use: No     Comment: none    Sexual activity: Yes     Partners: Male     Birth control/protection: I.U.D.   Other Topics Concern     Service Not Asked    Blood Transfusions Not Asked    Caffeine Concern Yes     Comment: soda, 1-2 cans oc.    Occupational Exposure Not Asked    Hobby Hazards Not Asked    Sleep Concern Not Asked    Stress Concern Not Asked    Weight Concern Not Asked    Special Diet Not Asked    Back Care Not Asked    Exercise Not Asked    Bike Helmet Not Asked    Seat Belt Not Asked    Self-Exams Not Asked   Social History Narrative    Not on file     Social Determinants of Health     Financial Resource Strain: Not on file   Food Insecurity: Not on file   Transportation Needs: Not on file   Physical Activity: Insufficiently Active (2019)    Received from [facility] [employer], [facility]    Exercise Vital Sign     Days of Exercise per Week: 3 days     Minutes of Exercise per  Session: 20 min   Stress: Not on file   Social Connections: Not on file   Housing Stability: Not on file         Depression Screening (PHQ-2/PHQ-9): Over the LAST 2 WEEKS   Little interest or pleasure in doing things (over the last two weeks)?: Not at all    Feeling down, depressed, or hopeless (over the last two weeks)?: Not at all    PHQ-2 SCORE: 0           FAMILY HISTORY:  Family History   Problem Relation Age of Onset    Diabetes Maternal Grandfather     Hypertension Maternal Grandfather     Diabetes Maternal Grandmother     Hypertension Maternal Grandmother        MEDICATIONS:    Current Outpatient Medications:     hydroCHLOROthiazide 12.5 MG Oral Tab, Take 1 tablet (12.5 mg total) by mouth daily., Disp: 90 tablet, Rfl: 1    ergocalciferol 1.25 MG (51431 UT) Oral Cap, Take 1 capsule (50,000 Units total) by mouth once a week., Disp: , Rfl:     Prenatal Vit-Fe Fumarate-FA (PRENATAL VITAMINS) 28-0.8 MG Oral Tab, Take 1 tablet by mouth daily., Disp: , Rfl:     fluconazole 150 MG Oral Tab, , Disp: , Rfl:     ALLERGIES:    Allergies   Allergen Reactions    Aloe HIVES    Aloe Vera HIVES    Cefdinir SWELLING     Swelling of hands and face    Sudafed [Pseudoephedrine] SWELLING     Facial swelling    Zithromax [Azithromycin] SWELLING         Review of Systems:  Constitutional:  Denies fatigue, night sweats, hot flashes  Eyes:  denies blurred or double vision  Cardiovascular:  denies chest pain or palpitations  Respiratory:  denies shortness of breath  Gastrointestinal:  denies heartburn, abdominal pain, diarrhea or constipation  Genitourinary:  denies dysuria, incontinence, abnormal vaginal discharge, vaginal itching,   Musculoskeletal:  denies back pain   Skin/Breast:  Denies any breast pain, lumps, or discharge.   Neurological:  denies headaches, extremity weakness or numbness.  Psychiatric: denies depression or anxiety.  Endocrine:   denies excessive thirst or urination.  Heme/Lymph:  denies history of anemia, easy  bruising or bleeding.      PHYSICAL EXAM:     Vitals:    04/29/24 1311   BP: 123/79   Pulse: 75   Weight: 158 lb (71.7 kg)       Body mass index is 30.86 kg/m².     Patient offered chaperone, patient declined    Constitutional: well developed, well nourished  Psychiatric:  Oriented to time, place, person and situation. Appropriate mood and affect  Head/Face: normocephalic  Neck/Thyroid: thyroid symmetric, no thyromegaly, no nodules, no adenopathy  Lymphatic:no abnormal supraclavicular or axillary adenopathy is noted  Breast: normal without palpable masses, tenderness, asymmetry, nipple discharge, nipple retraction or skin changes  Abdomen:  soft, nontender, nondistended, no masses  Skin/Hair: no unusual rashes or bruises  Extremities: no edema, no cyanosis    Pelvic Exam:  External Genitalia: normal appearance, hair distribution, and no lesions  Urethral Meatus:  normal in size, location, without lesions and prolapse  Bladder:  No fullness, masses or tenderness  Vagina:  Normal appearance without lesions, no abnormal discharge  Cervix:  +IUD strings, Normal without tenderness on motion  Uterus: normal in size, contour, position, mobility, without tenderness  Adnexa: normal without masses or tenderness  Perineum: normal  Anus: no hemorroids     Assessment & Plan:    ICD-10-CM    1. Well woman exam with routine gynecological exam  Z01.419       2. Screening for cervical cancer  Z12.4 ThinPrep PAP Smear     Hpv Dna  High Risk , Thin Prep Collect      3. Presence of IUD  Z97.5       4. Screening examination for STI  Z11.3 Chlamydia/Gc Amplification     Trichomonas vaginalis, CRYS (Vaginal/Cervical)         Reviewed ASCCP guidelines with the patient   Pap done today  Contraception: Using paragard IUD  Desires sti testing --Encouraged condoms to prevent STD exposure  Breast Health:     Reviewed current guidelines with the patient and to start Mammograms at age 40  Reviewed monthly self breast exams with the patient    Discussed diet, exercise, MVIs with Ca/Vit D  Follow up in 1 yr for [facility] [place]    This note was prepared using Dragon [employer] voice recognition dictation software. As a result errors may occur. When identified these errors have been corrected. While every attempt is made to correct errors during dictation discrepancies may still exist.

## 2024-05-01 LAB — T VAGINALIS RRNA SPEC QL NAA+PROBE: NEGATIVE

## 2024-05-03 LAB
.: NORMAL
.: NORMAL

## 2024-07-07 ENCOUNTER — HOSPITAL ENCOUNTER (EMERGENCY)
Facility: HOSPITAL | Age: 34
Discharge: HOME | End: 2024-07-08
Attending: EMERGENCY MEDICINE
Payer: MEDICAID

## 2024-07-07 DIAGNOSIS — K64.9 HEMORRHOIDS, UNSPECIFIED HEMORRHOID TYPE: Primary | ICD-10-CM

## 2024-07-07 LAB
ANION GAP SERPL CALC-SCNC: 6 MMOL/L (ref 0–18)
BUN BLD-MCNC: 11 MG/DL (ref 9–23)
BUN/CREAT SERPL: 11 (ref 10–20)
CALCIUM BLD-MCNC: 9.1 MG/DL (ref 8.7–10.4)
CHLORIDE SERPL-SCNC: 107 MMOL/L (ref 98–112)
CO2 SERPL-SCNC: 27 MMOL/L (ref 21–32)
CREAT BLD-MCNC: 1 MG/DL
EGFRCR SERPLBLD CKD-EPI 2021: 76 ML/MIN/1.73M2 (ref 60–?)
GLUCOSE BLD-MCNC: 88 MG/DL (ref 70–99)
OSMOLALITY SERPL CALC.SUM OF ELEC: 289 MOSM/KG (ref 275–295)
POTASSIUM SERPL-SCNC: 3.4 MMOL/L (ref 3.5–5.1)
SODIUM SERPL-SCNC: 140 MMOL/L (ref 136–145)

## 2024-07-07 PROCEDURE — 99283 EMERGENCY DEPT VISIT LOW MDM: CPT

## 2024-07-07 PROCEDURE — 85025 COMPLETE CBC W/AUTO DIFF WBC: CPT | Performed by: EMERGENCY MEDICINE

## 2024-07-07 PROCEDURE — 36415 COLL VENOUS BLD VENIPUNCTURE: CPT

## 2024-07-07 PROCEDURE — 80048 BASIC METABOLIC PNL TOTAL CA: CPT | Performed by: EMERGENCY MEDICINE

## 2024-07-07 PROCEDURE — 99284 EMERGENCY DEPT VISIT MOD MDM: CPT

## 2024-07-07 RX ORDER — HYDROCORTISONE 25 MG/G
1 CREAM TOPICAL 2 TIMES DAILY
Qty: 28 G | Refills: 0 | Status: SHIPPED | OUTPATIENT
Start: 2024-07-07 | End: 2024-07-08

## 2024-07-08 VITALS
RESPIRATION RATE: 18 BRPM | OXYGEN SATURATION: 98 % | WEIGHT: 150 LBS | HEART RATE: 66 BPM | BODY MASS INDEX: 27.6 KG/M2 | TEMPERATURE: 98 F | HEIGHT: 62 IN | DIASTOLIC BLOOD PRESSURE: 88 MMHG | SYSTOLIC BLOOD PRESSURE: 111 MMHG

## 2024-07-08 LAB
BASOPHILS # BLD AUTO: 0.08 X10(3) UL (ref 0–0.2)
BASOPHILS NFR BLD AUTO: 1.2 %
DEPRECATED RDW RBC AUTO: 40.1 FL (ref 35.1–46.3)
EOSINOPHIL # BLD AUTO: 0.29 X10(3) UL (ref 0–0.7)
EOSINOPHIL NFR BLD AUTO: 4.3 %
ERYTHROCYTE [DISTWIDTH] IN BLOOD BY AUTOMATED COUNT: 12.5 % (ref 11–15)
HCT VFR BLD AUTO: 37.4 %
HGB BLD-MCNC: 12.2 G/DL
IMM GRANULOCYTES # BLD AUTO: 0.01 X10(3) UL (ref 0–1)
IMM GRANULOCYTES NFR BLD: 0.1 %
LYMPHOCYTES # BLD AUTO: 2.66 X10(3) UL (ref 1–4)
LYMPHOCYTES NFR BLD AUTO: 39.8 %
MCH RBC QN AUTO: 28.3 PG (ref 26–34)
MCHC RBC AUTO-ENTMCNC: 32.6 G/DL (ref 31–37)
MCV RBC AUTO: 86.8 FL
MONOCYTES # BLD AUTO: 0.53 X10(3) UL (ref 0.1–1)
MONOCYTES NFR BLD AUTO: 7.9 %
NEUTROPHILS # BLD AUTO: 3.12 X10 (3) UL (ref 1.5–7.7)
NEUTROPHILS # BLD AUTO: 3.12 X10(3) UL (ref 1.5–7.7)
NEUTROPHILS NFR BLD AUTO: 46.7 %
PLATELET # BLD AUTO: 325 10(3)UL (ref 150–450)
RBC # BLD AUTO: 4.31 X10(6)UL
WBC # BLD AUTO: 6.7 X10(3) UL (ref 4–11)

## 2024-07-08 RX ORDER — HYDROCORTISONE 25 MG/G
1 CREAM TOPICAL 2 TIMES DAILY
Qty: 28 G | Refills: 0 | Status: SHIPPED | OUTPATIENT
Start: 2024-07-08 | End: 2024-07-22

## 2024-07-08 NOTE — ED INITIAL ASSESSMENT (HPI)
Pt. Arrives from home for abdominal pain LLQ, RLQ 8/10 and rectal bleeding from hemorrhoids. Pt. States hemorrhoids chronic since having kids however has been having diarrhea since Sunday and rectal bleeding when having bowel movements starting 45 minutes ago. Pt. Wearing pad due to bleeding from hemorrhoid. Pt. States nausea.

## 2024-07-08 NOTE — ED PROVIDER NOTES
Patient Seen in: [facility] Emergency Department      History     Chief Complaint   Patient presents with    Abdominal Pain    Anal Problem     Stated Complaint: abd pain, bleeding    Subjective:   HPI    Patient is a 34-year-old female who presents with rectal bleeding secondary to hemorrhoids that started immediately prior to arrival.  She feels slightly nauseous and has some abdominal cramping from the bleeding.  She did have diarrhea last week after coming back from [facility] but states this is now resolved.  She has chronic hemorrhoids but states they never bled like this before.  No fevers.    Objective:   No pertinent past medical history.            No pertinent past surgical history.              No pertinent social history.            Review of Systems    Positive for stated Chief Complaint: Abdominal Pain and Anal Problem    Other systems are as noted in HPI.  Constitutional and vital signs reviewed.      All other systems reviewed and negative except as noted above.    Physical Exam     ED Triage Vitals [07/07/24 2305]   /88   Pulse 77   Resp 18   Temp 98.1 °F (36.7 °C)   Temp src Oral   SpO2 99 %   O2 Device None (Room air)       Current Vitals:   Vital Signs  BP: 132/88  Pulse: 77  Resp: 18  Temp: 98.1 °F (36.7 °C)  Temp src: Oral    Oxygen Therapy  SpO2: 99 %  O2 Device: None (Room air)            Physical Exam    GENERAL: No acute distress, awake and alert  HEENT: EOMI, PERRL  Neck: supple  CV: RRR, no murmurs  Resp: CTAB, no wheezes or retractions  Ab: soft, nontender, no distension  : +external nonthrombosed hemorrhoid  Extremities: FROM of all extremities  Neuro: CN intact, normal speech, normal gait, 5/5 motor strength in all extremities, no focal deficits  SKIN: warm, dry, no rashes      ED Course     Labs Reviewed   BASIC METABOLIC PANEL (8) - Abnormal; Notable for the following components:       Result Value    Potassium 3.4 (*)     All other components within normal limits   CBC  W/ DIFFERENTIAL - Normal   CBC WITH DIFFERENTIAL WITH PLATELET    Narrative:     The following orders were created for panel order CBC With Differential With Platelet.  Procedure                               Abnormality         Status                     ---------                               -----------         ------                     CBC W/ DIFFERENTIAL[[id number]]          Normal              Preliminary result           Please view results for these tests on the individual orders.   SCAN SLIDE            MDM         Medical Decision Making  Bleeding stopped in emergency department.  Labs reassuring.  Advised on GI and or surgery follow-up.  We discussed care of hemorrhoids at home and she feels comfortable with discharge plan at this time.    Amount and/or Complexity of Data Reviewed  Labs: ordered.    Risk  Prescription drug management.        Disposition and Plan     Clinical Impression:  1. Hemorrhoids, unspecified hemorrhoid type         Disposition:  Discharge  7/8/2024 12:17 am    Follow-up:  Osito Mayen MD  [address] [place] [address]  [facility] [address] [phone]    Follow up      Shilpa Winters MD  [address] [address] [phone]    Follow up            Medications Prescribed:  Current Discharge Medication List        START taking these medications    Details   hydrocortisone 2.5 % External Cream Place 1 Application rectally 2 (two) times daily for 14 days.  Qty: 28 g, Refills: 0

## 2024-07-16 ENCOUNTER — OFFICE VISIT (OUTPATIENT)
Dept: SURGERY | Facility: CLINIC | Age: 34
End: 2024-07-16
Payer: MEDICAID

## 2024-07-16 ENCOUNTER — HOSPITAL ENCOUNTER (OUTPATIENT)
Age: 34
Discharge: HOME | End: 2024-07-16
Payer: MEDICAID

## 2024-07-16 VITALS
DIASTOLIC BLOOD PRESSURE: 87 MMHG | OXYGEN SATURATION: 100 % | SYSTOLIC BLOOD PRESSURE: 130 MMHG | RESPIRATION RATE: 18 BRPM | HEART RATE: 78 BPM | TEMPERATURE: 98 F

## 2024-07-16 VITALS — BODY MASS INDEX: 27 KG/M2 | WEIGHT: 150 LBS

## 2024-07-16 DIAGNOSIS — Z11.3 SCREEN FOR STD (SEXUALLY TRANSMITTED DISEASE): Primary | ICD-10-CM

## 2024-07-16 DIAGNOSIS — K64.2 GRADE III HEMORRHOIDS: Primary | ICD-10-CM

## 2024-07-16 LAB — B-HCG UR QL: NEGATIVE

## 2024-07-16 PROCEDURE — 87491 CHLMYD TRACH DNA AMP PROBE: CPT | Performed by: PHYSICIAN ASSISTANT

## 2024-07-16 PROCEDURE — 81514 NFCT DS BV&VAGINITIS DNA ALG: CPT | Performed by: PHYSICIAN ASSISTANT

## 2024-07-16 PROCEDURE — 99204 OFFICE O/P NEW MOD 45 MIN: CPT | Performed by: SURGERY

## 2024-07-16 PROCEDURE — 87591 N.GONORRHOEAE DNA AMP PROB: CPT | Performed by: PHYSICIAN ASSISTANT

## 2024-07-16 PROCEDURE — 46221 LIGATION OF HEMORRHOID(S): CPT | Performed by: SURGERY

## 2024-07-16 NOTE — ED PROVIDER NOTES
Chief Complaint   Patient presents with    Eval-G       History obtained from: patient   services not used    HPI:     Blake Hall is a 34 year old female who presents for asymptomatic STI testing. Patient states she has a new partner and wants STI testing before engaging in intercourse with new partner. Patient denies any symptoms and states she is feeling well. Denies vaginal discharge, vaginal bleeding, genital lesions or rash, urinary symptoms, abdominal pain, pelvic pain, flank pain, fevers, chills, nausea, vomiting.     PMH  Past Medical History:    Cornea abrasion    Chemical abrasion inferior cornea, OD    Cup to disc asymmetry    OU    History of abnormal cervical Pap smear    History of pregnancy    2009-induced ; 2014-\"Prince Danielle Motley\" 1st degree perineal laceration - vaginal wall. Mishra was a full term live born 7 pound 9 ounce male delivered by .    Subjective visual disturbance    OU       PFSH    PFSH asessment screens reviewed and agree.  Nurses notes reviewed I agree with documentation.    Family History   Problem Relation Age of Onset    Diabetes Maternal Grandfather     Hypertension Maternal Grandfather     Diabetes Maternal Grandmother     Hypertension Maternal Grandmother      Family history reviewed with patient/caregiver and is not pertinent to presenting problem.  Social History     Socioeconomic History    Marital status: Single     Spouse name: Not on file    Number of children: Not on file    Years of education: Not on file    Highest education level: Not on file   Occupational History    Not on file   Tobacco Use    Smoking status: Never    Smokeless tobacco: Never   Vaping Use    Vaping status: Never Used   Substance and Sexual Activity    Alcohol use: No     Alcohol/week: 0.0 standard drinks of alcohol     Comment: none    Drug use: No     Comment: none    Sexual activity: Yes     Partners: Male     Birth control/protection: I.U.D.    Other Topics Concern     Service Not Asked    Blood Transfusions Not Asked    Caffeine Concern Yes     Comment: soda, 1-2 cans oc.    Occupational Exposure Not Asked    Hobby Hazards Not Asked    Sleep Concern Not Asked    Stress Concern Not Asked    Weight Concern Not Asked    Special Diet Not Asked    Back Care Not Asked    Exercise Not Asked    Bike Helmet Not Asked    Seat Belt Not Asked    Self-Exams Not Asked   Social History Narrative    Not on file     Social Determinants of Health     Financial Resource Strain: Not on file   Food Insecurity: Not on file   Transportation Needs: Not on file   Physical Activity: Insufficiently Active (9/11/2019)    Received from [facility], [facility]    Exercise Vital Sign     Days of Exercise per Week: 3 days     Minutes of Exercise per Session: 20 min   Stress: Not on file   Social Connections: Not on file   Housing Stability: Not on file         ROS:   Positive for stated complaint: asymptomatic STI testing   All other systems reviewed and negative except as noted above.  Constitutional and Vital Signs Reviewed.    Physical Exam:     Findings:    /87   Pulse 78   Temp 98.3 °F (36.8 °C) (Temporal)   Resp 18   LMP 04/02/2024 (Exact Date)   SpO2 100%   GENERAL: well developed, no acute distress, non-toxic appearing   SKIN: good skin turgor, no obvious rashes  HEAD: normocephalic, atraumatic  EYES: sclera non-icteric bilaterally, conjunctiva clear bilaterally  OROPHARYNX: MMM, maintaining airway and secretions  NECK: no nuchal rigidity, no trismus, no edema, phonation normal    CARDIO: regular rate   LUNGS: no increased WOB  GI: abdomen soft and non-tender   : Radha RN chaperone present, no inguinal lymphadenopathy, no genital lesions or rash, scant thick white discharge present in vaginal canal, no bleeding or erythema, no CMT or adnexal tenderness   EXTREMITIES: no cyanosis or edema, RICHARD without difficulty  NEURO: no focal  deficits  PSYCH: alert and oriented x3, answering questions appropriately, mood appropriate    MDM/Assessment/Plan:   Orders for this encounter:    Orders Placed This Encounter    POCT Pregnancy, Urine    Chlamydia/Gc Amplification     Order Specific Question:   Release to patient     Answer:   Immediate    Vaginitis Vaginosis PCR Panel     Order Specific Question:   Release to patient     Answer:   Immediate    POCT Pregnancy, Urine       Labs performed this visit:  Recent Results (from the past 10 hour(s))   POCT Pregnancy, Urine    Collection Time: 07/16/24  1:29 PM   Result Value Ref Range    POCT Urine Pregnancy Negative Negative       Imaging performed this visit:  No orders to display       Medical Decision Making  DDx includes STI vs physiologic leukorrhea vs vaginitis vs other. Patient is overall very well-appearing with stable vitals. Patient denies any symptoms. Offered urine testing for gonorrhea/chlamydia and self-swab for vaginosis panel versus pelvic exam with vaginal swabs. Patient states she would prefer pelvic exam. Gonorrhea/chlamydia and vaginosis swabs collected, results pending. Discussed supportive care and safe sex practices. Instructed patient to go directly to nearest ER with any worsening or concerning symptoms. Follow up with PCP and/or gynecology.       Diagnosis:    ICD-10-CM    1. Screen for STD (sexually transmitted disease)  Z11.3 Chlamydia/Gc Amplification     Vaginitis Vaginosis PCR Panel     Chlamydia/Gc Amplification     Vaginitis Vaginosis PCR Panel          All results reviewed and discussed with patient/patient's family. Patient/patient's family verbalize excellent understanding of instructions and feels comfortable with plan. All of patient's/patient's family's questions were addressed.   See AVS for detailed discharge instructions for your condition today.    Follow Up with:  Sandi Moore MD  [address] [place] [address] [phone]            Note: This  document was dictated using Dragon medical dictation software.  Proofreading was performed to the best of my ability, but errors may be present.    Nicole Prater PA-C

## 2024-07-16 NOTE — PROCEDURES
[facility]     Operative Report    Patient Name:  Blake Hall  MR:  [id number]  :  1990  DOS:  24    Preop Dx:  Grade III internal hemorrhoids  Postop Dx:  Grade III internal hemorrhoids  Procedure:  Ligation of hemorrhoids (CPT 77816)  Surgeon:  Jairo Danielle MD  EBL: None  Complication:  None    INDICATION:  Pt is a 34 year old female who with grade III internal hemorrhoids who is scheduled for a banding procedure.    CONSENT:  An informed consent discussion was held with the patient regarding the nature of hemorrhoids, the treatment options and the details of the procedure.  The risks including but not limited to bleeding, wound infection, and recurrence were discussed.  The patient expressed understanding and want to proceed with the planned procedure.    TECHNIQUE:  The patient was placed in prone jackknife position.  The rectum and perineum were prep and draped in the usual fashion.  Lidocaine 5% ointment was used for topical anesthesia.  Rectal digital exam confirmed no other pathology.  The anoscope was gently inserted and advanced into the anal canal.  The right anterior hemorrhoidal tissue was visualized.  The Rubber Band Ligator was carefully applied with suction.  The ligator trigger was activated to produce rubber banding of the hemorrhoid.  The patient did not report pain and was discharged in stable condition.  Routine wound care instructions provided.    Jairo Danielle MD

## 2024-07-16 NOTE — H&P
HPI:    Patient ID: Blake Hall is a 34 year old female presenting with   Chief Complaint   Patient presents with    Hemorrhoids     Patient is here for complaints of hemorrhoids.  Has had the problem for 10 years.  Has internal and external hemorrhoids.  Was in the ED last week with significant bleeding.  Is not bleeding at this time, but is very painful.   .    Hemorrhoids        Past Medical History:    Cornea abrasion    Chemical abrasion inferior cornea, OD    Cup to disc asymmetry    OU    History of abnormal cervical Pap smear    History of pregnancy    -induced ; 2014-\"Prince Danielle Motley\" 1st degree perineal laceration - vaginal wall. Mishra was a full term live born 7 pound 9 ounce male delivered by .    Subjective visual disturbance    OU     Past Surgical History:   Procedure Laterality Date    Colposcopy, cervix w/upper adjacent vagina; w/biopsy(s), cervix        2014    Oral surgery procedure  2007    wisdom teeth extraction     Family History   Problem Relation Age of Onset    Diabetes Maternal Grandfather     Hypertension Maternal Grandfather     Diabetes Maternal Grandmother     Hypertension Maternal Grandmother      Social History     Socioeconomic History    Marital status: Single     Spouse name: Not on file    Number of children: Not on file    Years of education: Not on file    Highest education level: Not on file   Occupational History    Not on file   Tobacco Use    Smoking status: Never    Smokeless tobacco: Never   Vaping Use    Vaping status: Never Used   Substance and Sexual Activity    Alcohol use: No     Alcohol/week: 0.0 standard drinks of alcohol     Comment: none    Drug use: No     Comment: none    Sexual activity: Yes     Partners: Male     Birth control/protection: I.U.D.   Other Topics Concern     Service Not Asked    Blood Transfusions Not Asked    Caffeine Concern Yes     Comment: soda, 1-2 cans oc.    Occupational  Exposure Not Asked    Hobby Hazards Not Asked    Sleep Concern Not Asked    Stress Concern Not Asked    Weight Concern Not Asked    Special Diet Not Asked    Back Care Not Asked    Exercise Not Asked    Bike Helmet Not Asked    Seat Belt Not Asked    Self-Exams Not Asked   Social History Narrative    Not on file     Social Determinants of Health     Financial Resource Strain: Not on file   Food Insecurity: Not on file   Transportation Needs: Not on file   Physical Activity: Insufficiently Active (9/11/2019)    [facility]    Exercise Vital Sign     Days of Exercise per Week: 3 days     Minutes of Exercise per Session: 20 min   Stress: Not on file   Social Connections: Not on file   Housing Stability: Not on file       Review of Systems   Constitutional: Negative.    HENT: Negative.     Eyes: Negative.    Respiratory: Negative.     Cardiovascular: Negative.    Gastrointestinal:  Positive for anal bleeding.   Endocrine: Negative.    Genitourinary: Negative.    Musculoskeletal: Negative.    Skin: Negative.    Allergic/Immunologic: Negative.    Neurological: Negative.    Hematological:  Does not bruise/bleed easily.   Psychiatric/Behavioral: Negative.             Current Outpatient Medications   Medication Sig Dispense Refill    hydrocortisone 2.5 % External Cream Place 1 Application rectally 2 (two) times daily for 14 days. 28 g 0    hydroCHLOROthiazide 12.5 MG Oral Tab Take 1 tablet (12.5 mg total) by mouth daily. 90 tablet 1    ergocalciferol 1.25 MG (28192 UT) Oral Cap Take 1 capsule (50,000 Units total) by mouth once a week.      Prenatal Vit-Fe Fumarate-FA (PRENATAL VITAMINS) 28-0.8 MG Oral Tab Take 1 tablet by mouth daily.      fluconazole 150 MG Oral Tab          Allergies:  Allergies   Allergen Reactions    Aloe HIVES    Aloe Vera HIVES    Cefdinir SWELLING     Swelling of hands and face    Sudafed [Pseudoephedrine] SWELLING     Facial swelling    Zithromax  [Azithromycin] SWELLING      PHYSICAL EXAM:   Wt 150 lb (68 kg)   LMP 06/27/2024 (Exact Date)   BMI 27.44 kg/m²   Physical Exam  Vitals reviewed.   Constitutional:       Appearance: Normal appearance. She is well-developed.   HENT:      Head: Normocephalic and atraumatic.   Cardiovascular:      Rate and Rhythm: Normal rate and regular rhythm.   Pulmonary:      Effort: Pulmonary effort is normal.      Breath sounds: Normal breath sounds.   Abdominal:      General: There is no distension.      Palpations: Abdomen is soft. There is no mass.      Tenderness: There is no abdominal tenderness. There is no guarding or rebound.   Musculoskeletal:         General: Normal range of motion.      Cervical back: Normal range of motion and neck supple.   Skin:     General: Skin is warm and dry.   Neurological:      Mental Status: She is alert and oriented to person, place, and time.   Psychiatric:         Speech: Speech normal.         Behavior: Behavior normal.                 ASSESSMENT/PLAN:   Diagnoses and all orders for this visit:    Grade III hemorrhoids    Discussed in detail with pt and options reviewed - literature provided.  For short term treatment of pain and swelling, I recommend sitz baths and topical hydrocortisone cream and tucks pads.  Intermittent stool softener therapy and laxatives may be of benefit.  Avoid NSAIDs and blood thinners as able. The patient was also instructed to limit time sitting on the commode. We discussed the long term benefits of a high fiber diet or supplements with additional fluid intake.    We briefly discussed more invasive therapies for hemorrhoid disease, in the unlikely scenario of failed medical management.  These include banding, hemorrhoidectomy and hemorrhoid artery ligation/plication (THD procedure).    Pt wants to proceed with banding today.           Jairo Danielle MD  7/16/2024

## 2024-07-16 NOTE — DISCHARGE INSTRUCTIONS
Your test results will take 2 days. You can check your results on the [employer] adrienne or we will contact you if any results are positive to discuss any necessary treatment. Do not have sex until you know your results.     If any of your results are positive, your partner needs to be treated     Wear a condom every time you have sex    Follow up with your primary care provider and/or gynecologist

## 2024-07-17 ENCOUNTER — TELEPHONE (OUTPATIENT)
Dept: SURGERY | Facility: CLINIC | Age: 34
End: 2024-07-17

## 2024-07-17 LAB
BV BACTERIA DNA VAG QL NAA+PROBE: POSITIVE
C GLABRATA DNA VAG QL NAA+PROBE: NEGATIVE
C KRUSEI DNA VAG QL NAA+PROBE: NEGATIVE
C TRACH DNA SPEC QL NAA+PROBE: NEGATIVE
CANDIDA DNA VAG QL NAA+PROBE: NEGATIVE
N GONORRHOEA DNA SPEC QL NAA+PROBE: NEGATIVE
T VAGINALIS DNA VAG QL NAA+PROBE: NEGATIVE

## 2024-07-17 RX ORDER — METRONIDAZOLE 500 MG/1
500 TABLET ORAL 2 TIMES DAILY
Qty: 14 TABLET | Refills: 0 | Status: SHIPPED | OUTPATIENT
Start: 2024-07-17 | End: 2024-07-24

## 2024-07-17 NOTE — TELEPHONE ENCOUNTER
I called and spoke to pt. She states during first BM ( less than 24 hrs after procedure) the hemorrhoid band fell off.  She states only minimal bleeding is noted.   I explained to make another appointment for hemorrhoid banding.     Pt also states miralax rx was not sent to the pharmacy as dr Danielle advised.     Message routed to Dr. Danielle for rx.

## 2024-07-17 NOTE — TELEPHONE ENCOUNTER
Patient is calling with concern as banding from yesterday procedure came off during first bowel movement. Please advise

## 2024-08-02 ENCOUNTER — HOSPITAL ENCOUNTER (EMERGENCY)
Facility: HOSPITAL | Age: 34
Discharge: HOME | End: 2024-08-02
Attending: EMERGENCY MEDICINE
Payer: MEDICAID

## 2024-08-02 VITALS
HEART RATE: 87 BPM | RESPIRATION RATE: 18 BRPM | SYSTOLIC BLOOD PRESSURE: 129 MMHG | DIASTOLIC BLOOD PRESSURE: 87 MMHG | TEMPERATURE: 98 F

## 2024-08-02 DIAGNOSIS — L08.9 NONVENOMOUS INSECT BITE OF FACE WITH INFECTION, INITIAL ENCOUNTER: Primary | ICD-10-CM

## 2024-08-02 DIAGNOSIS — S00.86XA NONVENOMOUS INSECT BITE OF FACE WITH INFECTION, INITIAL ENCOUNTER: Primary | ICD-10-CM
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
## 2024-08-02 PROCEDURE — 99283 EMERGENCY DEPT VISIT LOW MDM: CPT

## 2024-08-02 RX ORDER — AMOXICILLIN AND CLAVULANATE POTASSIUM 875; 125 MG/1; MG/1
1 TABLET, FILM COATED ORAL 2 TIMES DAILY
Qty: 14 TABLET | Refills: 0 | Status: SHIPPED | OUTPATIENT
Start: 2024-08-02 | End: 2024-08-09

## 2024-08-03 NOTE — ED PROVIDER NOTES
Patient Seen in: [facility] Emergency Department      History     Chief Complaint   Patient presents with    Rash Skin Problem     Stated Complaint: bite    Subjective:   HPI    Patient is a 34-year-old female who states she had insect bites to her left medial leg.  The 1 on her thigh seems to be getting more red and warm.  She has been using triamcinolone cream.  There is some staining there is some itching.  No fever or chills.    Objective:   No pertinent past medical history.            No pertinent past surgical history.              No pertinent social history.            Review of Systems    Positive for stated Chief Complaint: Rash Skin Problem    Other systems are as noted in HPI.  Constitutional and vital signs reviewed.      All other systems reviewed and negative except as noted above.    Physical Exam     ED Triage Vitals [08/02/24 2213]   /87   Pulse 87   Resp 18   Temp 98.4 °F (36.9 °C)   Temp src    SpO2    O2 Device None (Room air)       Current Vitals:   Vital Signs  BP: 129/87  Pulse: 87  Resp: 18  Temp: 98.4 °F (36.9 °C)    Oxygen Therapy  O2 Device: None (Room air)            Physical Exam    Constitutional: Oriented to person, place, and time. Appears well-developed and well-nourished.   HEENT:   Head: Normocephalic and atraumatic.   Right Ear: External ear normal.   Left Ear: External ear normal.   Nose: Nose normal.   Mouth/Throat: Oropharynx is clear and moist.   Eyes: Conjunctivae and EOM are normal. Pupils are equal, round, and reactive to light.   Neck: Neck supple.   Cardiovascular: Normal rate, regular rhythm, normal heart sounds and intact distal pulses.    Pulmonary/Chest: Effort normal and breath sounds normal. No respiratory distress.   Abdominal: Soft. Bowel sounds are normal. Exhibits no distension and no mass. There is no tenderness. There is no rebound and no guarding.   Musculoskeletal: Normal range of motion. Exhibits no edema or tenderness.   Lymphadenopathy:  No cervical adenopathy.   Neurological: Alert and oriented to person, place, and time. Normal reflexes. No cranial nerve deficit. No motor os sensory defecits noted Coordination normal.   Skin: Skin is warm and dry.  Medial surface of left leg there are 2 insect bites the proximal and on the medial thigh has about 3 or 4 cm surrounding redness.  There is a small welt.  Psychiatric: Normal mood and affect. Behavior is normal. Judgment and thought content normal.   Nursing note and vitals reviewed.        ED Course   Labs Reviewed - No data to display                   MDM      Use of independent historian:     I personally reviewed and interpreted the images :     No results found.    Vitals:    08/02/24 2213   BP: 129/87   Pulse: 87   Resp: 18   Temp: 98.4 °F (36.9 °C)     *I personally reviewed and interpreted all ED vitals.    Pulse Ox:  , Room air, Normal       Differential Diagnosis/ Diagnostic Considerations: Bug bite with surrounding redness consider allergic reaction consider cellulitis no evidence of foreign body.  No systemic symptoms    Medical Record Review: I personally reviewed available prior medical records for any recent pertinent discharge summaries, testing, and procedures and reviewed those reports and found .    Complicating Factors: The patient already has cephalosporin allergy but does tolerate penicillins.  Which contribute to the complexity of this ED evaluation.    Social determinants of health:    Prescription drug management:      Shared Decision Making:    ED Course: Patient agrees with plan.    Discussion of management with other healthcare providers:    Condition upon leaving the department: Stable                                     Medical Decision Making      Disposition and Plan     Clinical Impression:  1. Nonvenomous insect bite of face with infection, initial encounter         Disposition:  Discharge  8/2/2024 10:58 pm    Follow-up:  Sandi Moore  [address]  [facility] [id number]  [phone]    Follow up in 2 day(s)            Medications Prescribed:  Current Discharge Medication List        START taking these medications    Details   amoxicillin clavulanate 875-125 MG Oral Tab Take 1 tablet by mouth 2 (two) times daily for 7 days.  Qty: 14 tablet, Refills: 0

## 2024-08-06 ENCOUNTER — TELEPHONE (OUTPATIENT)
Dept: SURGERY | Facility: CLINIC | Age: 34
End: 2024-08-06

## 2024-08-06 NOTE — TELEPHONE ENCOUNTER
Patient returning missed call to reschedule her procedure today due to Dr. Danielle being in surgery. Patient states she can not keep missing work and would prefer to reschedule for any day after 4PM or Tuesdays after 12PM or anytime Saturdays if possible. Please call.

## 2024-08-07 ENCOUNTER — TELEPHONE (OUTPATIENT)
Dept: SURGERY | Facility: CLINIC | Age: 34
End: 2024-08-07

## 2024-08-07 NOTE — TELEPHONE ENCOUNTER
Called patient to offer an appointment with Dr. Danielle for 8/13 either 12:50pm, 1pm or 1:30pm.

## 2024-09-08 ENCOUNTER — OFFICE VISIT (OUTPATIENT)
Dept: FAMILY MEDICINE CLINIC | Facility: CLINIC | Age: 34
End: 2024-09-08
Payer: MEDICAID

## 2024-09-08 VITALS
TEMPERATURE: 99 F | DIASTOLIC BLOOD PRESSURE: 84 MMHG | BODY MASS INDEX: 29 KG/M2 | RESPIRATION RATE: 16 BRPM | SYSTOLIC BLOOD PRESSURE: 120 MMHG | WEIGHT: 161 LBS | HEART RATE: 77 BPM | OXYGEN SATURATION: 99 %

## 2024-09-08 DIAGNOSIS — N89.8 VAGINAL DISCHARGE: Primary | ICD-10-CM

## 2024-09-08 LAB
APPEARANCE: CLEAR
BILIRUBIN: NEGATIVE
GLUCOSE (URINE DIPSTICK): NEGATIVE MG/DL
KETONES (URINE DIPSTICK): NEGATIVE MG/DL
LEUKOCYTES: NEGATIVE
MULTISTIX LOT#: ABNORMAL NUMERIC
NITRITE, URINE: NEGATIVE
PH, URINE: 8 (ref 4.5–8)
PROTEIN (URINE DIPSTICK): NEGATIVE MG/DL
SPECIFIC GRAVITY: 1.02 (ref 1–1.03)
URINE-COLOR: YELLOW
UROBILINOGEN,SEMI-QN: 0.2 MG/DL (ref 0–1.9)

## 2024-09-08 PROCEDURE — 81514 NFCT DS BV&VAGINITIS DNA ALG: CPT | Performed by: NURSE PRACTITIONER

## 2024-09-08 PROCEDURE — 99213 OFFICE O/P EST LOW 20 MIN: CPT | Performed by: NURSE PRACTITIONER

## 2024-09-08 PROCEDURE — 87491 CHLMYD TRACH DNA AMP PROBE: CPT | Performed by: NURSE PRACTITIONER

## 2024-09-08 PROCEDURE — 87591 N.GONORRHOEAE DNA AMP PROB: CPT | Performed by: NURSE PRACTITIONER

## 2024-09-08 PROCEDURE — 81003 URINALYSIS AUTO W/O SCOPE: CPT | Performed by: NURSE PRACTITIONER

## 2024-09-08 RX ORDER — TRIAMCINOLONE ACETONIDE 1 MG/G
1 OINTMENT TOPICAL 2 TIMES DAILY
COMMUNITY
Start: 2024-09-04

## 2024-09-08 RX ORDER — HYDROCORTISONE 2.5 %
1 CREAM (GRAM) TOPICAL 2 TIMES DAILY
COMMUNITY
Start: 2024-09-04

## 2024-09-08 NOTE — PROGRESS NOTES
CHIEF COMPLAINT:     Chief Complaint   Patient presents with    Vaginal Problem     Sx 4 days - Milky white discharge that changes in consistency, metal odor  Denies frequency, urgency, lower abdominal pressure, lower back pain, fever, chills, headache, nausea  No OTC - Took Diflucan yesterday AM       HPI:   Blake Hall is a 34 year old female who presents with symptoms of possible vaginal infection.  Onset 4 days. C/o milky white discharge, different odor than usual (not fish like).  Denies dysuria, urine frequency, abdominal pain, back pain, fever, chills, N/V, hematuria, or vaginal itching/pain.  Recent finished keflex for an insect bite, prescribed in [place].  Then took 2 diflucan but hasn't had resolution in symptoms.  Just finished menses as well.  Prone to both BV and yeast in the past.    Current Outpatient Medications   Medication Sig Dispense Refill    hydrocortisone 2.5 % External Cream Apply 1 Application topically 2 (two) times daily.      triamcinolone 0.1 % External Ointment Apply 1 Application topically 2 (two) times daily.      hydroCHLOROthiazide 12.5 MG Oral Tab Take 1 tablet (12.5 mg total) by mouth daily. 90 tablet 1    ergocalciferol 1.25 MG (52058 UT) Oral Cap Take 1 capsule (50,000 Units total) by mouth once a week.      fluconazole 150 MG Oral Tab         Past Medical History:    Cornea abrasion    Chemical abrasion inferior cornea, OD    Cup to disc asymmetry    OU    History of abnormal cervical Pap smear    History of pregnancy    2009-induced ; 2014-\"[place]  Danielle Motley\" 1st degree perineal laceration - vaginal wall. Mishra was a full term live born 7 pound 9 ounce male delivered by .    Subjective visual disturbance    OU      Social History:  Social History     Socioeconomic History    Marital status: Single   Tobacco Use    Smoking status: Never    Smokeless tobacco: Never   Vaping Use    Vaping status: Never Used   Substance and Sexual Activity     Alcohol use: No     Alcohol/week: 0.0 standard drinks of alcohol     Comment: none    Drug use: No     Comment: none    Sexual activity: Yes     Partners: Male     Birth control/protection: I.U.D.   Other Topics Concern    Caffeine Concern Yes     Comment: soda, 1-2 cans oc.     Social Determinants of Health     Physical Activity: Insufficiently Active (9/11/2019)    [facility]    Exercise Vital Sign     Days of Exercise per Week: 3 days     Minutes of Exercise per Session: 20 min         REVIEW OF SYSTEMS:   GENERAL: Denies fever, chills, or body aches  SKIN: no rashes, no skin wounds or ulcers.  EYES:denies blurred vision or double vision  HEENT: no congestion, rhinorrhea, sore throat or ear pain  CARDIOVASCULAR: denies chest pain or palpitations  LUNGS: denies shortness of breath, cough, or wheezing  GI: See HPI. No N/V/C/D.   : See HPI.  NEURO: no headaches.    EXAM:   /84   Pulse 77   Temp 98.7 °F (37.1 °C)   Resp 16   Wt 161 lb (73 kg)   LMP 08/22/2024 (Exact Date)   SpO2 99%   BMI 29.45 kg/m²   GENERAL: well developed, well nourished, and in no apparent distress  GI: BS present x 4 and normoactive.  No hepatosplenomegaly.  : No suprapubic tenderness, No bladder distention or CVAT.  Pelvic Exam:  External Genitalia: normal appearance, hair distribution, and no lesions  Urethral Meatus:  normal in size, location, without lesions and prolapse  Vagina:  Normal appearance without lesions, no abnormal discharge    Recent Results (from the past 24 hour(s))   URINALYSIS, AUTO, W/O SCOPE    Collection Time: 09/08/24 10:53 AM   Result Value Ref Range    Glucose Urine Negative Negative mg/dL    Bilirubin Urine Negative Negative    Ketones, UA Negative Negative - Trace mg/dL    Spec Gravity 1.020 1.005 - 1.030    Blood Urine Small (A) Negative    PH Urine 8.0 5.0 - 8.0    Protein Urine Negative Negative - Trace mg/dL    Urobilinogen Urine 0.2 0.2 - 1.0  mg/dL    Nitrite Urine Negative Negative    Leukocyte Esterase Urine Negative Negative    APPEARANCE Clear Clear    Color Urine Yellow Yellow    Multistix Lot# [id number] Numeric    Multistix Expiration Date 05/31/2025 Date         ASSESSMENT AND PLAN:   Blake Hall is a 34 year old female presents with UTI symptoms.    ASSESSMENT:  Encounter Diagnosis   Name Primary?    Vaginal discharge Yes       PLAN:   - Reports prone to BV and yeast in the past.  Just completed diflucan without resolution of symptoms.  For her BV, has typically needed to do clindamycin pills as has had lip swelling issue with flagyl in the past and metrogel has not been effective per pt.  - Vaginal culture obtained  - GC/chlamydia screening obtained.  - Urine dip with no leuks, denies urinary complaints.  - Comfort measures as described in Patient Instructions.    - Advised F/U visit if no improvement/worsening within 2-3 days.  To ER if ever moderate to severe abdominal/flank pain or new fevers.  - Pt verbalizes understanding and is agreeable w/ plan.    Meds & Refills for this Visit:  Requested Prescriptions      No prescriptions requested or ordered in this encounter       Risk and benefits of medication discussed. Stressed importance of completing full course of antibiotic. Pt verbalizes understanding.    There are no Patient Instructions on file for this visit.

## 2024-09-09 LAB
BV BACTERIA DNA VAG QL NAA+PROBE: NEGATIVE
C GLABRATA DNA VAG QL NAA+PROBE: NEGATIVE
C KRUSEI DNA VAG QL NAA+PROBE: NEGATIVE
C TRACH DNA SPEC QL NAA+PROBE: NEGATIVE
CANDIDA DNA VAG QL NAA+PROBE: NEGATIVE
N GONORRHOEA DNA SPEC QL NAA+PROBE: NEGATIVE
T VAGINALIS DNA VAG QL NAA+PROBE: NEGATIVE

## 2024-09-12 ENCOUNTER — TELEPHONE (OUTPATIENT)
Dept: FAMILY MEDICINE CLINIC | Facility: CLINIC | Age: 34
End: 2024-09-12

## 2024-09-12 NOTE — TELEPHONE ENCOUNTER
Patient seen 9/8 for vaginal discharge.  All labs WNL (neg BV, candida, trich, gonorrhea/chlamydia).  Patient still with vaginal discharge and would like medication for this.  Patient did not answer return call.  MCM sent with detailed information--Advised there is nothing to treat.  Likely physiologic discharge. Needs to see GYN if wants further eval or having worsening symptoms.

## 2024-09-23 ENCOUNTER — HOSPITAL ENCOUNTER (EMERGENCY)
Facility: HOSPITAL | Age: 34
Discharge: HOME | End: 2024-09-23
Attending: STUDENT IN AN ORGANIZED HEALTH CARE EDUCATION/TRAINING PROGRAM
Payer: MEDICAID

## 2024-09-23 ENCOUNTER — APPOINTMENT (OUTPATIENT)
Dept: GENERAL RADIOLOGY | Facility: HOSPITAL | Age: 34
End: 2024-09-23
Attending: STUDENT IN AN ORGANIZED HEALTH CARE EDUCATION/TRAINING PROGRAM
Payer: MEDICAID

## 2024-09-23 VITALS
DIASTOLIC BLOOD PRESSURE: 84 MMHG | RESPIRATION RATE: 16 BRPM | TEMPERATURE: 98 F | SYSTOLIC BLOOD PRESSURE: 121 MMHG | WEIGHT: 160 LBS | HEART RATE: 78 BPM | BODY MASS INDEX: 29.44 KG/M2 | OXYGEN SATURATION: 97 % | HEIGHT: 62 IN
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
## 2024-09-23 DIAGNOSIS — M62.838 CERVICAL PARASPINAL MUSCLE SPASM: ICD-10-CM

## 2024-09-23 PROCEDURE — 73030 X-RAY EXAM OF SHOULDER: CPT | Performed by: STUDENT IN AN ORGANIZED HEALTH CARE EDUCATION/TRAINING PROGRAM

## 2024-09-23 PROCEDURE — 99284 EMERGENCY DEPT VISIT MOD MDM: CPT

## 2024-09-23 RX ORDER — KETOROLAC TROMETHAMINE 10 MG/1
10 TABLET, FILM COATED ORAL EVERY 6 HOURS PRN
Qty: 20 TABLET | Refills: 0 | Status: SHIPPED | OUTPATIENT
Start: 2024-09-23 | End: 2024-09-30

## 2024-09-23 RX ORDER — KETOROLAC TROMETHAMINE 10 MG/1
10 TABLET, FILM COATED ORAL EVERY 6 HOURS PRN
Qty: 20 TABLET | Refills: 0 | Status: SHIPPED | OUTPATIENT
Start: 2024-09-23 | End: 2024-09-23

## 2024-09-23 RX ORDER — ORPHENADRINE CITRATE 100 MG/1
100 TABLET, EXTENDED RELEASE ORAL 2 TIMES DAILY
Qty: 14 EACH | Refills: 0 | Status: SHIPPED | OUTPATIENT
Start: 2024-09-23 | End: 2024-09-23

## 2024-09-23 RX ORDER — ORPHENADRINE CITRATE 100 MG/1
100 TABLET, EXTENDED RELEASE ORAL 2 TIMES DAILY
Qty: 14 EACH | Refills: 0 | Status: SHIPPED | OUTPATIENT
Start: 2024-09-23 | End: 2024-09-30
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
## 2024-10-10 ENCOUNTER — OFFICE VISIT (OUTPATIENT)
Dept: FAMILY MEDICINE CLINIC | Facility: CLINIC | Age: 34
End: 2024-10-10

## 2024-10-10 ENCOUNTER — LAB ENCOUNTER (OUTPATIENT)
Dept: LAB | Facility: HOSPITAL | Age: 34
End: 2024-10-10
Attending: STUDENT IN AN ORGANIZED HEALTH CARE EDUCATION/TRAINING PROGRAM
Payer: MEDICAID

## 2024-10-10 VITALS
HEIGHT: 62 IN | SYSTOLIC BLOOD PRESSURE: 115 MMHG | BODY MASS INDEX: 30.18 KG/M2 | WEIGHT: 164 LBS | DIASTOLIC BLOOD PRESSURE: 72 MMHG | HEART RATE: 76 BPM

## 2024-10-10 DIAGNOSIS — Z00.00 WELL ADULT EXAM: Primary | ICD-10-CM

## 2024-10-10 DIAGNOSIS — I10 HYPERTENSION, UNSPECIFIED TYPE: ICD-10-CM

## 2024-10-10 DIAGNOSIS — N89.8 VAGINAL DISCHARGE: ICD-10-CM

## 2024-10-10 DIAGNOSIS — D50.9 IRON DEFICIENCY ANEMIA, UNSPECIFIED IRON DEFICIENCY ANEMIA TYPE: ICD-10-CM

## 2024-10-10 DIAGNOSIS — R09.82 POSTNASAL DRIP: ICD-10-CM

## 2024-10-10 DIAGNOSIS — Z00.00 WELL ADULT EXAM: ICD-10-CM

## 2024-10-10 DIAGNOSIS — K64.2 GRADE III HEMORRHOIDS: ICD-10-CM

## 2024-10-10 DIAGNOSIS — M54.50 ACUTE BILATERAL LOW BACK PAIN WITHOUT SCIATICA: ICD-10-CM

## 2024-10-10 LAB
ALBUMIN SERPL-MCNC: 4.5 G/DL (ref 3.2–4.8)
ALBUMIN/GLOB SERPL: 1.3 {RATIO} (ref 1–2)
ALP LIVER SERPL-CCNC: 73 U/L
ALT SERPL-CCNC: 8 U/L
ANION GAP SERPL CALC-SCNC: 8 MMOL/L (ref 0–18)
AST SERPL-CCNC: 13 U/L (ref ?–34)
BILIRUB SERPL-MCNC: 0.7 MG/DL (ref 0.3–1.2)
BUN BLD-MCNC: 15 MG/DL (ref 9–23)
BUN/CREAT SERPL: 15.5 (ref 10–20)
CALCIUM BLD-MCNC: 9.3 MG/DL (ref 8.7–10.4)
CHLORIDE SERPL-SCNC: 106 MMOL/L (ref 98–112)
CHOLEST SERPL-MCNC: 205 MG/DL (ref ?–200)
CO2 SERPL-SCNC: 26 MMOL/L (ref 21–32)
CREAT BLD-MCNC: 0.97 MG/DL
DEPRECATED HBV CORE AB SER IA-ACNC: 7 NG/ML
DEPRECATED RDW RBC AUTO: 45.8 FL (ref 35.1–46.3)
EGFRCR SERPLBLD CKD-EPI 2021: 79 ML/MIN/1.73M2 (ref 60–?)
ERYTHROCYTE [DISTWIDTH] IN BLOOD BY AUTOMATED COUNT: 14.6 % (ref 11–15)
EST. AVERAGE GLUCOSE BLD GHB EST-MCNC: 97 MG/DL (ref 68–126)
FASTING PATIENT LIPID ANSWER: NO
FASTING STATUS PATIENT QL REPORTED: NO
GLOBULIN PLAS-MCNC: 3.4 G/DL (ref 2–3.5)
GLUCOSE BLD-MCNC: 72 MG/DL (ref 70–99)
HBA1C MFR BLD: 5 % (ref ?–5.7)
HCT VFR BLD AUTO: 35.6 %
HDLC SERPL-MCNC: 76 MG/DL (ref 40–59)
HGB BLD-MCNC: 11.5 G/DL
IRON SATN MFR SERPL: 11 %
IRON SERPL-MCNC: 42 UG/DL
LDLC SERPL CALC-MCNC: 121 MG/DL (ref ?–100)
MCH RBC QN AUTO: 27.6 PG (ref 26–34)
MCHC RBC AUTO-ENTMCNC: 32.3 G/DL (ref 31–37)
MCV RBC AUTO: 85.4 FL
NONHDLC SERPL-MCNC: 129 MG/DL (ref ?–130)
OSMOLALITY SERPL CALC.SUM OF ELEC: 289 MOSM/KG (ref 275–295)
PLATELET # BLD AUTO: 275 10(3)UL (ref 150–450)
POTASSIUM SERPL-SCNC: 3.3 MMOL/L (ref 3.5–5.1)
PROT SERPL-MCNC: 7.9 G/DL (ref 5.7–8.2)
RBC # BLD AUTO: 4.17 X10(6)UL
SODIUM SERPL-SCNC: 140 MMOL/L (ref 136–145)
TIBC SERPL-MCNC: 390 UG/DL (ref 250–425)
TRANSFERRIN SERPL-MCNC: 262 MG/DL (ref 250–380)
TRIGL SERPL-MCNC: 46 MG/DL (ref 30–149)
TSI SER-ACNC: 0.85 MIU/ML (ref 0.55–4.78)
VIT D+METAB SERPL-MCNC: 31.2 NG/ML (ref 30–100)
VLDLC SERPL CALC-MCNC: 8 MG/DL (ref 0–30)
WBC # BLD AUTO: 5.3 X10(3) UL (ref 4–11)

## 2024-10-10 PROCEDURE — 84443 ASSAY THYROID STIM HORMONE: CPT

## 2024-10-10 PROCEDURE — 99213 OFFICE O/P EST LOW 20 MIN: CPT | Performed by: STUDENT IN AN ORGANIZED HEALTH CARE EDUCATION/TRAINING PROGRAM

## 2024-10-10 PROCEDURE — 84466 ASSAY OF TRANSFERRIN: CPT

## 2024-10-10 PROCEDURE — 80061 LIPID PANEL: CPT

## 2024-10-10 PROCEDURE — 83036 HEMOGLOBIN GLYCOSYLATED A1C: CPT

## 2024-10-10 PROCEDURE — 85027 COMPLETE CBC AUTOMATED: CPT

## 2024-10-10 PROCEDURE — 82728 ASSAY OF FERRITIN: CPT

## 2024-10-10 PROCEDURE — 99395 PREV VISIT EST AGE 18-39: CPT | Performed by: STUDENT IN AN ORGANIZED HEALTH CARE EDUCATION/TRAINING PROGRAM

## 2024-10-10 PROCEDURE — 83540 ASSAY OF IRON: CPT

## 2024-10-10 PROCEDURE — 36415 COLL VENOUS BLD VENIPUNCTURE: CPT

## 2024-10-10 PROCEDURE — 82306 VITAMIN D 25 HYDROXY: CPT

## 2024-10-10 PROCEDURE — 80053 COMPREHEN METABOLIC PANEL: CPT

## 2024-10-10 RX ORDER — LIDOCAINE HYDROCHLORIDE 20 MG/ML
325 SOLUTION ORAL; TOPICAL DAILY
COMMUNITY
Start: 2024-09-17

## 2024-10-10 RX ORDER — PHENTERMINE HYDROCHLORIDE 15 MG/1
15 CAPSULE ORAL
COMMUNITY
Start: 2024-09-26

## 2024-10-10 RX ORDER — PNV NO.95/FERROUS FUM/FOLIC AC 28MG-0.8MG
1 TABLET ORAL DAILY
COMMUNITY
Start: 2024-09-17

## 2024-10-10 RX ORDER — FLUTICASONE PROPIONATE 50 MCG
1 SPRAY, SUSPENSION (ML) NASAL DAILY
Qty: 1 EACH | Refills: 3 | Status: SHIPPED | OUTPATIENT
Start: 2024-10-10

## 2024-10-10 RX ORDER — TERCONAZOLE 8 MG/G
1 CREAM VAGINAL NIGHTLY PRN
Qty: 20 G | Refills: 1 | Status: SHIPPED | OUTPATIENT
Start: 2024-10-10

## 2024-10-10 RX ORDER — HYDROCHLOROTHIAZIDE 12.5 MG/1
12.5 TABLET ORAL DAILY
Qty: 90 TABLET | Refills: 1 | Status: SHIPPED | OUTPATIENT
Start: 2024-10-10
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
## 2024-10-24 ENCOUNTER — HOSPITAL ENCOUNTER (OUTPATIENT)
Age: 34
Discharge: HOME | End: 2024-10-24
Payer: MEDICAID

## 2024-10-24 VITALS
SYSTOLIC BLOOD PRESSURE: 136 MMHG | OXYGEN SATURATION: 100 % | HEART RATE: 104 BPM | DIASTOLIC BLOOD PRESSURE: 83 MMHG | RESPIRATION RATE: 18 BRPM | TEMPERATURE: 98 F

## 2024-10-24 DIAGNOSIS — Z11.3 SCREEN FOR STD (SEXUALLY TRANSMITTED DISEASE): Primary | ICD-10-CM

## 2024-10-24 DIAGNOSIS — N89.8 VAGINAL DISCHARGE: ICD-10-CM

## 2024-10-24 LAB — B-HCG UR QL: NEGATIVE

## 2024-10-24 PROCEDURE — 99213 OFFICE O/P EST LOW 20 MIN: CPT | Performed by: PHYSICIAN ASSISTANT

## 2024-10-24 PROCEDURE — 81025 URINE PREGNANCY TEST: CPT | Performed by: PHYSICIAN ASSISTANT

## 2024-10-25 RX ORDER — CLINDAMYCIN HYDROCHLORIDE 300 MG/1
300 CAPSULE ORAL 3 TIMES DAILY
Qty: 21 CAPSULE | Refills: 0 | Status: SHIPPED | OUTPATIENT
Start: 2024-10-25 | End: 2024-11-01

## 2024-10-25 RX ORDER — CLINDAMYCIN PHOSPHATE 20 MG/G
1 CREAM VAGINAL NIGHTLY
Qty: 1 EACH | Refills: 0 | Status: SHIPPED | OUTPATIENT
Start: 2024-10-25 | End: 2024-10-25

## 2024-10-25 NOTE — ED PROVIDER NOTES
Chief Complaint   Patient presents with    Eval-G       History obtained from: patient   services not used     HPI:     Blake Hall is a 34 year old female who presents with vaginal discharge x 1 day. Patient describes as white/yellow discharge. Patient requesting STD testing. Patient states she is otherwise feeling well and denies any additional complaints. Denies vaginal bleeding, urinary symptoms, abdominal pain, pelvic pain, flank pain, fevers, chills, nausea, vomiting.     PMH  Past Medical History:    Anemia    Cornea abrasion    Chemical abrasion inferior cornea, OD    Cup to disc asymmetry    OU    Essential hypertension    History of abnormal cervical Pap smear    History of pregnancy    2009-induced ; 2014-\"Prince Danielle Motley\" 1st degree perineal laceration - vaginal wall. Mishra was a full term live born 7 pound 9 ounce male delivered by .    Subjective visual disturbance    OU       PFSH    PFSH asessment screens reviewed and agree.  Nurses notes reviewed I agree with documentation.    Family History   Problem Relation Age of Onset    Diabetes Maternal Grandfather     Hypertension Maternal Grandfather     Diabetes Maternal Grandmother     Hypertension Maternal Grandmother      Family history reviewed with patient/caregiver and is not pertinent to presenting problem.  Social History     Socioeconomic History    Marital status: Single     Spouse name: Not on file    Number of children: Not on file    Years of education: Not on file    Highest education level: Not on file   Occupational History    Not on file   Tobacco Use    Smoking status: Never    Smokeless tobacco: Never   Vaping Use    Vaping status: Never Used   Substance and Sexual Activity    Alcohol use: No     Comment: none    Drug use: No     Comment: none    Sexual activity: Yes     Partners: Male     Birth control/protection: I.U.D.   Other Topics Concern     Service Not Asked    Blood  Transfusions Not Asked    Caffeine Concern Yes     Comment: soda, 1-2 cans oc.    Occupational Exposure Not Asked    Hobby Hazards Not Asked    Sleep Concern Not Asked    Stress Concern Not Asked    Weight Concern Not Asked    Special Diet Not Asked    Back Care Not Asked    Exercise Not Asked    Bike Helmet Not Asked    Seat Belt Not Asked    Self-Exams Not Asked   Social History Narrative    Not on file     Social Drivers of Health     Financial Resource Strain: Not on file   Food Insecurity: Not on file   Transportation Needs: Not on file   Physical Activity: Insufficiently Active (9/11/2019)    Received from [facility], [facility]    Exercise Vital Sign     Days of Exercise per Week: 3 days     Minutes of Exercise per Session: 20 min   Stress: Not on file   Social Connections: Not on file   Housing Stability: Not on file         ROS:   Positive for stated complaint: vaginal discharge   All other systems reviewed and negative except as noted above.  Constitutional and Vital Signs Reviewed.    Physical Exam:     Findings:    /83   Pulse 104   Temp 97.7 °F (36.5 °C) (Temporal)   Resp 18   LMP 10/22/2024 (Exact Date)   SpO2 100%   GENERAL: well developed, no acute distress, non-toxic appearing   SKIN: good skin turgor, no obvious rashes  HEAD: normocephalic, atraumatic  EYES: sclera non-icteric bilaterally, conjunctiva clear bilaterally  OROPHARYNX: MMM, maintaining airway and secretions  NECK: no nuchal rigidity, no trismus, no edema, phonation normal    CARDIO: RRR, normal heart sounds  LUNGS: no increased WOB, clear lungs bilaterally   GI: abdomen soft and non-tender, no CVA tenderness bilaterally   : Radha RN chaperone present, no inguinal lymphadenopathy, no genital rash or lesions, scant to moderate white/yellow discharge present in vaginal canal, no bleeding, no erythema or lesions to cervix, IUD strings visualized at cervical os, no CMT, no adnexal tenderness bilaterally    EXTREMITIES: no cyanosis or edema, RICHARD without difficulty  NEURO: no focal deficits  PSYCH: alert and oriented x3, answering questions appropriately, mood appropriate    MDM/Assessment/Plan:   Orders for this encounter:    Orders Placed This Encounter    POCT Pregnancy, Urine    Chlamydia/Gc Amplification     Order Specific Question:   Release to patient     Answer:   Immediate    Vaginitis Vaginosis PCR Panel     Order Specific Question:   Release to patient     Answer:   Immediate    POCT Pregnancy, Urine       Labs performed this visit:  Recent Results (from the past 10 hours)   POCT Pregnancy, Urine    Collection Time: 10/24/24  7:06 PM   Result Value Ref Range    POCT Urine Pregnancy Negative Negative       Imaging performed this visit:  No orders to display       Medical Decision Making  DDx includes physiologic leukorrhea versus STI versus vaginitis versus vaginosis versus other.  Patient is overall well-appearing with stable vitals and tolerating oral intake.  No signs or symptoms of systemic illness or PID.  Urine pregnancy test negative.  Gonorrhea/chlamydia and vaginitis/vaginosis test pending.  Shared decision making employed with patient to defer empiric treatment pending culture results.  Discussed supportive care and safe sex practices.  Instructed patient to go directly to nearest ER with any worsening or concerning symptoms.  Follow-up with PCP and/or gynecology.    Amount and/or Complexity of Data Reviewed  Labs: ordered.          Diagnosis:    ICD-10-CM    1. Screen for STD (sexually transmitted disease)  Z11.3 Chlamydia/Gc Amplification     Vaginitis Vaginosis PCR Panel     Chlamydia/Gc Amplification     Vaginitis Vaginosis PCR Panel      2. Vaginal discharge  N89.8 Chlamydia/Gc Amplification     Vaginitis Vaginosis PCR Panel     Chlamydia/Gc Amplification     Vaginitis Vaginosis PCR Panel          All results reviewed and discussed with patient/patient's family. Patient/patient's family  verbalize excellent understanding of instructions and feels comfortable with plan. All of patient's/patient's family's questions were addressed.   See AVS for detailed discharge instructions for your condition today.    Follow Up with:  Sandi Moore MD  [address] [place] [address] [id number] [phone]            Note: This document was dictated using Dragon medical dictation software.  Proofreading was performed to the best of my ability, but errors may be present.    Nicole Prater PA-C

## 2024-10-25 NOTE — DISCHARGE INSTRUCTIONS
Your test results will take 1-2 days. You can check your results on the [facility] or we will contact you if any results are positive to discuss any necessary treatment. Do not have sex until you know your results.     If any of your results are positive, your partner needs to be treated     Wear a condom every time you have sex    Follow up with your primary care provider and/or gynecologist

## 2024-11-27 ENCOUNTER — HOSPITAL ENCOUNTER (OUTPATIENT)
Age: 34
Discharge: HOME | End: 2024-11-27
Payer: MEDICAID

## 2024-11-27 VITALS
SYSTOLIC BLOOD PRESSURE: 120 MMHG | OXYGEN SATURATION: 100 % | RESPIRATION RATE: 18 BRPM | DIASTOLIC BLOOD PRESSURE: 83 MMHG | HEART RATE: 77 BPM | TEMPERATURE: 97 F

## 2024-11-27 DIAGNOSIS — N89.8 VAGINAL DISCHARGE: Primary | ICD-10-CM

## 2024-11-27 LAB — B-HCG UR QL: NEGATIVE

## 2024-11-27 PROCEDURE — 99213 OFFICE O/P EST LOW 20 MIN: CPT | Performed by: PHYSICIAN ASSISTANT

## 2024-11-27 PROCEDURE — 81025 URINE PREGNANCY TEST: CPT | Performed by: PHYSICIAN ASSISTANT

## 2024-11-27 RX ORDER — CLINDAMYCIN HYDROCHLORIDE 300 MG/1
300 CAPSULE ORAL 2 TIMES DAILY
Qty: 14 CAPSULE | Refills: 0 | Status: SHIPPED | OUTPATIENT
Start: 2024-11-27 | End: 2024-12-04

## 2024-11-28 LAB
BV BACTERIA DNA VAG QL NAA+PROBE: POSITIVE
C GLABRATA DNA VAG QL NAA+PROBE: NEGATIVE
C KRUSEI DNA VAG QL NAA+PROBE: NEGATIVE
CANDIDA DNA VAG QL NAA+PROBE: NEGATIVE
T VAGINALIS DNA VAG QL NAA+PROBE: NEGATIVE

## 2024-11-28 NOTE — ED PROVIDER NOTES
Chief Complaint   Patient presents with    Eval-G       History obtained from: patient   services not used     HPI:     Blake Hall is a 34 year old female who presents with vaginal discharge x 2 days. Patient describes discharge as white/yellow and malodorous. Patient has a history of frequent BV infections and states symptoms are similar. Patient requesting STI testing. Patient states she is otherwise feeling well and denies any additional complaints. Denies vaginal bleeding, urinary symptoms, abdominal pain, pelvic pain, flank pain, fevers, chills, nausea, vomiting.     PMH  Past Medical History:    Anemia    Cornea abrasion    Chemical abrasion inferior cornea, OD    Cup to disc asymmetry    OU    Essential hypertension    History of abnormal cervical Pap smear    History of pregnancy    2009-induced ; 2014-\"[facility]u\" 1st degree perineal laceration - vaginal wall. Mishra was a full term live born 7 pound 9 ounce male delivered by .    Subjective visual disturbance    OU       PFSH    PFSH asessment screens reviewed and agree.  Nurses notes reviewed I agree with documentation.    Family History   Problem Relation Age of Onset    Diabetes Maternal Grandfather     Hypertension Maternal Grandfather     Diabetes Maternal Grandmother     Hypertension Maternal Grandmother      Family history reviewed with patient/caregiver and is not pertinent to presenting problem.  Social History     Socioeconomic History    Marital status: Single     Spouse name: Not on file    Number of children: Not on file    Years of education: Not on file    Highest education level: Not on file   Occupational History    Not on file   Tobacco Use    Smoking status: Never    Smokeless tobacco: Never   Vaping Use    Vaping status: Never Used   Substance and Sexual Activity    Alcohol use: No     Comment: none    Drug use: No     Comment: none    Sexual activity: Yes     Partners: Male      Birth control/protection: I.U.D.   Other Topics Concern     Service Not Asked    Blood Transfusions Not Asked    Caffeine Concern Yes     Comment: soda, 1-2 cans oc.    Occupational Exposure Not Asked    Hobby Hazards Not Asked    Sleep Concern Not Asked    Stress Concern Not Asked    Weight Concern Not Asked    Special Diet Not Asked    Back Care Not Asked    Exercise Not Asked    Bike Helmet Not Asked    Seat Belt Not Asked    Self-Exams Not Asked   Social History Narrative    Not on file     Social Drivers of Health     Financial Resource Strain: Not on file   Food Insecurity: Not on file   Transportation Needs: Not on file   Physical Activity: Insufficiently Active (9/11/2019)    Received from [facility], [facility]    Exercise Vital Sign     Days of Exercise per Week: 3 days     Minutes of Exercise per Session: 20 min   Stress: Not on file   Social Connections: Not on file   Housing Stability: Not on file         ROS:   Positive for stated complaint: vaginal discharge   All other systems reviewed and negative except as noted above.  Constitutional and Vital Signs Reviewed.    Physical Exam:     Findings:    /83   Pulse 77   Temp 97.3 °F (36.3 °C) (Temporal)   Resp 18   LMP 11/11/2024 (Exact Date)   SpO2 100%   GENERAL: well developed, no acute distress, non-toxic appearing   SKIN: good skin turgor, no obvious rashes  HEAD: normocephalic, atraumatic  EYES: sclera non-icteric bilaterally, conjunctiva clear bilaterally  OROPHARYNX: MMM, maintaining airway and secretions  NECK: no nuchal rigidity, no trismus, no edema, phonation normal    CARDIO: regular rate   LUNGS: no increased WOB  GI: abdomen soft and non-tender, no CVA tenderness bilaterally   : Radha RN chaperone present, no inguinal lymphadenopathy, no genital rash or lesions, moderate yellow discharge present in vaginal canal, no bleeding, no erythema or lesions to cervix, no CMT, no adnexal tenderness  bilaterally   EXTREMITIES: no cyanosis or edema, RICHARD without difficulty  NEURO: no focal deficits  PSYCH: alert and oriented x3, answering questions appropriately, mood appropriate    MDM/Assessment/Plan:   Orders for this encounter:    Orders Placed This Encounter    POCT Pregnancy, Urine    Chlamydia/Gc Amplification     Order Specific Question:   Release to patient     Answer:   Immediate    Vaginitis Vaginosis PCR Panel     Order Specific Question:   Release to patient     Answer:   Immediate    POCT Pregnancy, Urine    clindamycin 300 MG Oral Cap     Sig: Take 1 capsule (300 mg total) by mouth 2 (two) times daily for 7 days.     Dispense:  14 capsule     Refill:  0       Labs performed this visit:  Recent Results (from the past 10 hours)   POCT Pregnancy, Urine    Collection Time: 11/27/24  6:02 PM   Result Value Ref Range    POCT Urine Pregnancy Negative Negative       Imaging performed this visit:  No orders to display       Medical Decision Making  DDx includes physiologic leukorrhea versus STI versus vaginitis versus vaginosis versus other.  Patient is overall well-appearing with stable vitals and tolerating oral intake.  No signs or symptoms of systemic illness or PID.  Urine pregnancy test negative.  Gonorrhea/chlamydia and vaginitis/vaginosis test pending.  Shared decision making employed with patient to initiate empiric treatment for BV given history of similar symptoms pending culture results. Rx clindamycin given patient's allergies which patient has tolerated well in the past. Discussed supportive care and safe sex practices.  Instructed patient to go directly to nearest ER with any worsening or concerning symptoms.  Follow-up with PCP and/or gynecology.    Amount and/or Complexity of Data Reviewed  Labs: ordered.    Risk  OTC drugs.  Prescription drug management.          Diagnosis:    ICD-10-CM    1. Vaginal discharge  N89.8 Chlamydia/Gc Amplification     Vaginitis Vaginosis PCR Panel      Chlamydia/Gc Amplification     Vaginitis Vaginosis PCR Panel          All results reviewed and discussed with patient/patient's family. Patient/patient's family verbalize excellent understanding of instructions and feels comfortable with plan. All of patient's/patient's family's questions were addressed.   See AVS for detailed discharge instructions for your condition today.    Follow Up with:  Sandi Moore MD  [address] [place] [id number]  [phone]            Note: This document was dictated using Dragon medical dictation software.  Proofreading was performed to the best of my ability, but errors may be present.    Nicole Prater PA-C

## 2024-11-28 NOTE — DISCHARGE INSTRUCTIONS
Complete entire course of antibiotic as directed    Your test results will take 2 days. You can check your results on the [facility] or we will contact you if any results are positive to discuss any necessary treatment. Do not have sex until you know your results.     If any of your results are positive, your partner needs to be treated     Wear a condom every time you have sex

## 2024-11-29 LAB
C TRACH DNA SPEC QL NAA+PROBE: NEGATIVE
N GONORRHOEA DNA SPEC QL NAA+PROBE: NEGATIVE

## 2024-12-20 ENCOUNTER — HOSPITAL ENCOUNTER (OUTPATIENT)
Age: 34
Discharge: HOME | End: 2024-12-20
Payer: MEDICAID

## 2024-12-20 VITALS
OXYGEN SATURATION: 100 % | RESPIRATION RATE: 20 BRPM | SYSTOLIC BLOOD PRESSURE: 125 MMHG | TEMPERATURE: 98 F | HEART RATE: 75 BPM | DIASTOLIC BLOOD PRESSURE: 83 MMHG

## 2024-12-20 DIAGNOSIS — N89.8 VAGINAL DISCHARGE: Primary | ICD-10-CM

## 2024-12-20 LAB
B-HCG UR QL: NEGATIVE
BILIRUB UR QL STRIP: NEGATIVE
CLARITY UR: CLEAR
COLOR UR: YELLOW
GLUCOSE UR STRIP-MCNC: NEGATIVE MG/DL
KETONES UR STRIP-MCNC: NEGATIVE MG/DL
LEUKOCYTE ESTERASE UR QL STRIP: NEGATIVE
NITRITE UR QL STRIP: NEGATIVE
PH UR STRIP: 7.5 [PH]
PROT UR STRIP-MCNC: NEGATIVE MG/DL
SP GR UR STRIP: 1.01
UROBILINOGEN UR STRIP-ACNC: <2 MG/DL

## 2024-12-20 PROCEDURE — 99213 OFFICE O/P EST LOW 20 MIN: CPT | Performed by: NURSE PRACTITIONER

## 2024-12-20 PROCEDURE — 81025 URINE PREGNANCY TEST: CPT | Performed by: NURSE PRACTITIONER

## 2024-12-20 PROCEDURE — 81002 URINALYSIS NONAUTO W/O SCOPE: CPT | Performed by: NURSE PRACTITIONER

## 2024-12-20 RX ORDER — CLINDAMYCIN HYDROCHLORIDE 300 MG/1
300 CAPSULE ORAL 2 TIMES DAILY
Qty: 14 CAPSULE | Refills: 0 | Status: SHIPPED | OUTPATIENT
Start: 2024-12-20 | End: 2024-12-27

## 2024-12-21 LAB
BV BACTERIA DNA VAG QL NAA+PROBE: POSITIVE
C GLABRATA DNA VAG QL NAA+PROBE: NEGATIVE
C KRUSEI DNA VAG QL NAA+PROBE: NEGATIVE
CANDIDA DNA VAG QL NAA+PROBE: POSITIVE
T VAGINALIS DNA VAG QL NAA+PROBE: NEGATIVE

## 2024-12-21 RX ORDER — FLUCONAZOLE 150 MG/1
150 TABLET ORAL ONCE
Qty: 2 TABLET | Refills: 0 | Status: SHIPPED | OUTPATIENT
Start: 2024-12-21 | End: 2024-12-21

## 2024-12-21 NOTE — ED INITIAL ASSESSMENT (HPI)
Patient reports diagnosis of BV one month ago, states she just finished her menstrual cycle and began to have vaginal discharge with odor.

## 2024-12-21 NOTE — ED PROVIDER NOTES
Patient Seen in: [facility]      History     Chief Complaint   Patient presents with    Vaginal Discharge     Stated Complaint: Eval-G  Subjective:   HPI    This is a well-appearing 34-year-old with a history of BV, anemia, hypertension who presents with a chief complaint of vaginal discharge.  Patient states that she completed a course of clindamycin 1 month ago for BV.  Patient states that the last few days she started having a vaginal discharge with odor again.,  This does happen after she finishes her menstrual cycle, normally will test positive for BV.  Patient states that she does an appointment scheduled with her gynecologist on Tuesday but states she went to come in to be treated for BV until she can follow-up with her gynecologist.  No pelvic pain.  No urinary symptoms.  Not concern for STIs but states she would like to be tested.      Objective:   Past Medical History:    Anemia    Cornea abrasion    Chemical abrasion inferior cornea, OD    Cup to disc asymmetry    OU    Essential hypertension    History of abnormal cervical Pap smear    History of pregnancy    2009-induced ; 2014-\"Prince Danielle Motley\" 1st degree perineal laceration - vaginal wall. Mishra was a full term live born 7 pound 9 ounce male delivered by .    Subjective visual disturbance    OU            Past Surgical History:   Procedure Laterality Date    Colposcopy, cervix w/upper adjacent vagina; w/biopsy(s), cervix        2014    Oral surgery procedure  2007    wisdom teeth extraction              Social History     Socioeconomic History    Marital status: Single   Tobacco Use    Smoking status: Never    Smokeless tobacco: Never   Vaping Use    Vaping status: Never Used   Substance and Sexual Activity    Alcohol use: No     Comment: none    Drug use: No     Comment: none    Sexual activity: Yes     Partners: Male     Birth control/protection: I.U.D.   Other Topics Concern    Caffeine Concern Yes      Comment: soda, 1-2 cans oc.     Social Drivers of Health     Physical Activity: Insufficiently Active (9/11/2019)    [facility]    Exercise Vital Sign     Days of Exercise per Week: 3 days     Minutes of Exercise per Session: 20 min            Review of Systems   All other systems reviewed and are negative.      Positive for stated complaint: Vaginal Discharge    Other systems are as noted in HPI.  Constitutional and vital signs reviewed.      All other systems reviewed and negative except as noted above.    Physical Exam     ED Triage Vitals [12/20/24 1753]   /83   Pulse 75   Resp 20   Temp 97.8 °F (36.6 °C)   Temp src Oral   SpO2 100 %   O2 Device None (Room air)     Current:/83   Pulse 75   Temp 97.8 °F (36.6 °C) (Oral)   Resp 20   LMP 12/11/2024 (Exact Date)   SpO2 100%     Physical Exam  Vitals and nursing note reviewed. Exam conducted with a chaperone present (fanny).   Constitutional:       General: She is awake. She is not in acute distress.     Appearance: Normal appearance. She is not ill-appearing, toxic-appearing or diaphoretic.   HENT:      Head: Normocephalic and atraumatic.      Right Ear: Tympanic membrane, ear canal and external ear normal.      Left Ear: Tympanic membrane, ear canal and external ear normal.      Nose: Nose normal.      Mouth/Throat:      Mouth: Mucous membranes are moist.      Pharynx: Oropharynx is clear. Uvula midline.   Eyes:      General: Lids are normal.      Extraocular Movements: Extraocular movements intact.      Conjunctiva/sclera: Conjunctivae normal.      Pupils: Pupils are equal, round, and reactive to light.   Cardiovascular:      Rate and Rhythm: Normal rate and regular rhythm.      Pulses: Normal pulses.      Heart sounds: Normal heart sounds.   Pulmonary:      Effort: Pulmonary effort is normal.      Breath sounds: Normal breath sounds and air entry. No stridor, decreased air movement or  transmitted upper airway sounds.   Genitourinary:     Vagina: Signs of injury present.      Cervix: Normal.      Adnexa: Right adnexa normal and left adnexa normal.      Comments: Scant amount of white vaginal discharge,  Skin:     General: Skin is warm and dry.      Capillary Refill: Capillary refill takes less than 2 seconds.   Neurological:      General: No focal deficit present.      Mental Status: She is alert and oriented to person, place, and time.   Psychiatric:         Mood and Affect: Mood normal.         Behavior: Behavior normal. Behavior is cooperative.         Thought Content: Thought content normal.         Judgment: Judgment normal.       ED Course   Urinalysis reviewed, trace blood otherwise unremarkable.  Pregnancy negative.  GC/chlamydia and vaginosis screening sent  No results found.  Labs Reviewed   [facility] POCT URINALYSIS DIPSTICK - Abnormal; Notable for the following components:       Result Value    Blood, Urine Trace-Intact (*)     All other components within normal limits   POCT PREGNANCY URINE - Normal   VAGINITIS VAGINOSIS PCR PANEL   CHLAMYDIA/GONOCOCCUS, CRYS       MDM     Medical Decision Making  Differential diagnoses reflecting the complexity of care include but are not limited to BV, yeast vaginitis, STI.    Comorbidities that add complexity to management include: N/A  History obtained by an independent source was from: N/A  Discussions of management was done with: N/A  My independent interpretations of studies include: N/A  Shared decision making was done by: Patient and myself  Patient is well appearing, non-toxic and in no acute distress.  Vital signs are stable.   Patient would like to be treated for BV at this time.  Does have an allergy to Flagyl.  States she has tolerated clindamycin oral in the past, typically when she gets the cream it does not resolve her symptoms.  She does have gynecology appointment on Tuesday.  Encouraged her to go to scheduled appointment.  Will prescribe  clindamycin 300 mg twice daily x 7 days.  Discussed with her we will call her with results from vaginosis, GC/chlamydia testing.    Problems Addressed:  Vaginal discharge: acute illness or injury    Amount and/or Complexity of Data Reviewed  ECG/medicine tests: ordered and independent interpretation performed. Decision-making details documented in ED Course.    Risk  OTC drugs.  Prescription drug management.        Disposition and Plan     Clinical Impression:  1. Vaginal discharge         Disposition:  Discharge  12/20/2024  6:14 pm    Follow-up:  Sandi Moore MD  [address] [place] [phone]  [phone]                Medications Prescribed:  Current Discharge Medication List             Note to patient: The 21st Century cares act makes medical notes like these available to patients in the interest of transparency.  However, be advised this medical document and is intended as peer to peer communication.  It is read the medical language and may contain abbreviations or verbiage that are unfamiliar.  It may appear blunt or direct.  Medical documents are intended to carry relevant information, fax is evident and the clinical opinion of the practitioner.    This note was prepared using Dragon Medical voice recognition dictation software.  As a result, errors may occur.  When identified, these errors have been corrected.  While every attempt is made to correct errors during dictation, discrepancies may still exist.    VAHE Gallagher  12/20/2024  6:16 PM

## 2024-12-23 LAB
C TRACH DNA SPEC QL NAA+PROBE: NEGATIVE
N GONORRHOEA DNA SPEC QL NAA+PROBE: NEGATIVE

## 2024-12-24 ENCOUNTER — OFFICE VISIT (OUTPATIENT)
Dept: FAMILY MEDICINE CLINIC | Facility: CLINIC | Age: 34
End: 2024-12-24

## 2024-12-24 VITALS
OXYGEN SATURATION: 100 % | DIASTOLIC BLOOD PRESSURE: 75 MMHG | HEIGHT: 62 IN | HEART RATE: 81 BPM | BODY MASS INDEX: 27.23 KG/M2 | WEIGHT: 148 LBS | SYSTOLIC BLOOD PRESSURE: 110 MMHG

## 2024-12-24 DIAGNOSIS — N76.0 VAGINITIS AND VULVOVAGINITIS: Primary | ICD-10-CM

## 2024-12-24 DIAGNOSIS — N89.8 VAGINAL DISCHARGE: ICD-10-CM

## 2024-12-24 RX ORDER — TERCONAZOLE 8 MG/G
1 CREAM VAGINAL NIGHTLY PRN
Qty: 20 G | Refills: 1 | Status: SHIPPED | OUTPATIENT
Start: 2024-12-24

## 2024-12-24 RX ORDER — CLINDAMYCIN PHOSPHATE 20 MG/G
CREAM VAGINAL NIGHTLY
COMMUNITY
Start: 2024-10-28

## 2024-12-24 NOTE — PROGRESS NOTES
Subjective:   Blake Hall is a 34 year old female who presents for Vaginal Problem (Pt states she has been having vaginal abnormalities after or before her cycle continuously. ).     Patient complaint of vaginal discharge and itching. Onset of symptoms was yesterday. Patient denies dysuria, bloody urine, pelvic pain, abdominal pain, back pain, fever, chills, nausea, and vomiting. The patient sexual history is active with one male partner. Patient started having BV for the past 4 years. She gets one every month before or after menstrual cycle.       History/Other:      Chief Complaint Reviewed and Verified  Nursing Notes Reviewed and   Verified  Tobacco Reviewed  Allergies Reviewed  Medications Reviewed    Problem List Reviewed  Medical History Reviewed  Surgical History   Reviewed  OB Status Reviewed  Family History Reviewed  Social History   Reviewed           Tobacco:         Current Outpatient Medications   Medication Sig Dispense Refill    clindamycin 2 % Vaginal Cream Place vaginally nightly. (Patient not taking: Reported on 12/30/2024)      Terconazole 0.8 % Vaginal Cream Place 1 applicator vaginally nightly as needed. Use daily as needed for irritation related to period. 20 g 1    Prenatal Vit-Fe Fumarate-FA (PRENATAL VITAMINS) 28-0.8 MG Oral Tab Take 1 tablet by mouth daily.      Phentermine HCl 15 MG Oral Cap Take 1 capsule (15 mg total) by mouth.      hydroCHLOROthiazide 12.5 MG Oral Tab Take 1 tablet (12.5 mg total) by mouth daily. 90 tablet 1    fluticasone propionate 50 MCG/ACT Nasal Suspension 1 spray by Nasal route daily. One spray per each nostril daily. 1 each 3    hydrocortisone 2.5 % External Cream Apply 1 Application topically 2 (two) times daily.      triamcinolone 0.1 % External Ointment Apply 1 Application topically 2 (two) times daily.      fluconazole 150 MG Oral Tab Take 1 tablet (150 mg total) by mouth daily as needed.      FEROSUL 325 (65 Fe) MG Oral Tab Take 1  tablet (325 mg total) by mouth daily.         Allergies[1]      Review of Systems   Constitutional: Negative.  Negative for activity change and fatigue.   HENT: Negative.  Negative for congestion, ear pain, rhinorrhea and sneezing.    Eyes: Negative.  Negative for redness.   Respiratory: Negative.  Negative for cough, shortness of breath and wheezing.    Cardiovascular: Negative.  Negative for chest pain.   Gastrointestinal: Negative.  Negative for abdominal pain, constipation, diarrhea, nausea and vomiting.   Endocrine: Negative.    Genitourinary:  Positive for vaginal discharge. Negative for difficulty urinating and frequency.   Musculoskeletal: Negative.  Negative for back pain, joint swelling and myalgias.   Skin: Negative.  Negative for rash.   Allergic/Immunologic: Negative.    Neurological: Negative.  Negative for dizziness, syncope, light-headedness and headaches.   Hematological: Negative.    Psychiatric/Behavioral: Negative.           Objective:   /75 (BP Location: Right arm, Patient Position: Sitting, Cuff Size: adult)   Pulse 81   Ht 5' 2\" (1.575 m)   Wt 148 lb (67.1 kg)   LMP 12/11/2024 (Exact Date)   SpO2 100%   BMI 27.07 kg/m²  Estimated body mass index is 27.07 kg/m² as calculated from the following:    Height as of this encounter: 5' 2\" (1.575 m).    Weight as of this encounter: 148 lb (67.1 kg).      Physical Exam  Vitals and nursing note reviewed.   Constitutional:       Appearance: Normal appearance. She is normal weight.   HENT:      Head: Normocephalic and atraumatic.      Right Ear: Tympanic membrane normal.      Left Ear: Tympanic membrane normal.      Nose: Nose normal.      Mouth/Throat:      Mouth: Mucous membranes are moist.      Pharynx: Oropharynx is clear.   Eyes:      Extraocular Movements: Extraocular movements intact.      Conjunctiva/sclera: Conjunctivae normal.      Pupils: Pupils are equal, round, and reactive to light.   Cardiovascular:      Rate and Rhythm: Normal  rate and regular rhythm.      Pulses: Normal pulses.      Heart sounds: Normal heart sounds.   Pulmonary:      Effort: Pulmonary effort is normal.      Breath sounds: Normal breath sounds.   Abdominal:      General: Abdomen is flat. Bowel sounds are normal.      Palpations: Abdomen is soft.   Musculoskeletal:         General: Normal range of motion.      Cervical back: Normal range of motion and neck supple.   Skin:     General: Skin is warm and dry.   Neurological:      General: No focal deficit present.      Mental Status: She is alert and oriented to person, place, and time. Mental status is at baseline.   Psychiatric:         Mood and Affect: Mood normal.         Behavior: Behavior normal.         Thought Content: Thought content normal.         Judgment: Judgment normal.           Assessment & Plan:     1. Vaginitis and vulvovaginitis  - OB Referral - [place] ([facility])    2. Vaginal discharge  - Terconazole 0.8 % Vaginal Cream; Place 1 applicator vaginally nightly as needed. Use daily as needed for irritation related to period.  Dispense: 20 g; Refill: 1  - OB Referral - [place] ([facility])         Medication use, effects and side effects discussed in detail with patient. The patient  indicated understanding of the diagnosis and agreed with the plan of care.    Return if symptoms worsen or fail to improve.    VAHE Rahman         [1]   Allergies  Allergen Reactions    Aloe HIVES    Aloe Vera HIVES    Azithromycin SWELLING, ANAPHYLAXIS and UNKNOWN    Cefdinir SWELLING     Swelling of hands and face    Metronidazole SWELLING    Sudafed [Pseudoephedrine] SWELLING     Facial swelling    Lidocaine RASH

## 2024-12-30 ENCOUNTER — OFFICE VISIT (OUTPATIENT)
Dept: OBGYN CLINIC | Facility: CLINIC | Age: 34
End: 2024-12-30
Payer: MEDICAID

## 2024-12-30 ENCOUNTER — LAB ENCOUNTER (OUTPATIENT)
Dept: LAB | Facility: HOSPITAL | Age: 34
End: 2024-12-30
Attending: NURSE PRACTITIONER
Payer: MEDICAID

## 2024-12-30 VITALS
DIASTOLIC BLOOD PRESSURE: 85 MMHG | WEIGHT: 153.38 LBS | HEART RATE: 90 BPM | SYSTOLIC BLOOD PRESSURE: 129 MMHG | BODY MASS INDEX: 28 KG/M2

## 2024-12-30 DIAGNOSIS — R35.0 URINARY FREQUENCY: ICD-10-CM

## 2024-12-30 DIAGNOSIS — N76.0 BACTERIAL VAGINOSIS: ICD-10-CM

## 2024-12-30 DIAGNOSIS — Z97.5 PRESENCE OF IUD: ICD-10-CM

## 2024-12-30 DIAGNOSIS — B96.89 BACTERIAL VAGINOSIS: ICD-10-CM

## 2024-12-30 DIAGNOSIS — R10.2 PELVIC PAIN: ICD-10-CM

## 2024-12-30 DIAGNOSIS — R10.2 PELVIC PAIN: Primary | ICD-10-CM

## 2024-12-30 LAB
BILIRUB UR QL: NEGATIVE
CLARITY UR: CLEAR
GLUCOSE UR-MCNC: NORMAL MG/DL
HGB UR QL STRIP.AUTO: NEGATIVE
KETONES UR-MCNC: NEGATIVE MG/DL
LEUKOCYTE ESTERASE UR QL STRIP.AUTO: NEGATIVE
NITRITE UR QL STRIP.AUTO: NEGATIVE
PH UR: 6 [PH] (ref 5–8)
PROT UR-MCNC: NEGATIVE MG/DL
SP GR UR STRIP: 1.02 (ref 1–1.03)
UROBILINOGEN UR STRIP-ACNC: NORMAL

## 2024-12-30 PROCEDURE — 81003 URINALYSIS AUTO W/O SCOPE: CPT

## 2024-12-30 PROCEDURE — 87086 URINE CULTURE/COLONY COUNT: CPT

## 2024-12-30 PROCEDURE — 99214 OFFICE O/P EST MOD 30 MIN: CPT | Performed by: NURSE PRACTITIONER

## 2024-12-30 NOTE — PROGRESS NOTES
[facility]   Obstetrics and Gynecology    Blake Hall is a 34 year old female  Patient's last menstrual period was 2024 (exact date).   Chief Complaint   Patient presents with    Gyn Problem     REACCURING BV AND DISCHARGE FOR FOUR YEARS   .   Last treated for bacterial vaginosis in October, November & December. In December she also had a yeast infection.  Treated  with oral clindamycin and diflucan. Last took 2 days ago. Hx of allergy to metronidazole.  Also treated with oral clindamycin on .    Now reports feeling abdominal spasm bilateral and low back.  Some urinary frequency yesterday, was drinking water a lot. No fever, no constipation or diarrhea. Negative sti testing on .    Last sexually active 3 months ago.  Pap:3/2021 NILM, negative HPV  Contraception: paragard IUD     OBSTETRICS HISTORY:  OB History    Para Term  AB Living   3 2 2 0 1 2   SAB IAB Ectopic Multiple Live Births   0 0 0 0 2       GYNE HISTORY:  Menarche: 12 (2024  8:19 AM)  Period Cycle (Days): 28 (2024  8:19 AM)  Period Duration (Days): 7 DAYS (2024  8:19 AM)  Period Flow: Heavy (2024  8:19 AM)  Use of Birth Control (if yes, specify type): Paraguard IUD (2024  8:19 AM)  Hx Prior Abnormal Pap: No (2024  8:19 AM)  Pap Date: 21 (2024  8:19 AM)  Pap Result Notes: Pap neg, HPV neg / Dexa 17 (2024  8:19 AM)      History   Sexual Activity    Sexual activity: Not Currently    Partners: Male    Birth control/ protection: I.U.D.       MEDICAL HISTORY:  Past Medical History:    Anemia    Cornea abrasion    Chemical abrasion inferior cornea, OD    Cup to disc asymmetry    OU    Essential hypertension    History of abnormal cervical Pap smear    History of pregnancy    2009-induced ; 2014-\"[place]  Danielle Motley\" 1st degree perineal laceration - vaginal wall. Mishra was a full term live born 7 pound 9 ounce male  delivered by [facility].    Subjective visual disturbance    OU       SOCIAL HISTORY:  Social History     Socioeconomic History    Marital status: Single     Spouse name: Not on file    Number of children: Not on file    Years of education: Not on file    Highest education level: Not on file   Occupational History    Not on file   Tobacco Use    Smoking status: Never    Smokeless tobacco: Never   Vaping Use    Vaping status: Never Used   Substance and Sexual Activity    Alcohol use: Yes     Comment: RARELY    Drug use: No     Comment: none    Sexual activity: Not Currently     Partners: Male     Birth control/protection: I.U.D.   Other Topics Concern     Service Not Asked    Blood Transfusions Not Asked    Caffeine Concern Yes     Comment: soda, 1-2 cans oc.    Occupational Exposure Not Asked    Hobby Hazards Not Asked    Sleep Concern Not Asked    Stress Concern Not Asked    Weight Concern Not Asked    Special Diet Not Asked    Back Care Not Asked    Exercise Not Asked    Bike Helmet Not Asked    Seat Belt Not Asked    Self-Exams Not Asked   Social History Narrative    Not on file     Social Drivers of Health     Financial Resource Strain: Not on file   Food Insecurity: Not on file   Transportation Needs: Not on file   Physical Activity: Insufficiently Active (2019)    Received from [employer]    Exercise Vital Sign     Days of Exercise per Week: 3 days     Minutes of Exercise per Session: 20 min   Stress: Not on file   Social Connections: Not on file   Housing Stability: Not on file       MEDICATIONS:    Current Outpatient Medications:     Terconazole 0.8 % Vaginal Cream, Place 1 applicator vaginally nightly as needed. Use daily as needed for irritation related to period., Disp: 20 g, Rfl: 1    Prenatal Vit-Fe Fumarate-FA (PRENATAL VITAMINS) 28-0.8 MG Oral Tab, Take 1 tablet by mouth daily., Disp: , Rfl:     Phentermine HCl 15 MG Oral Cap, Take 1 capsule (15 mg total) by  mouth., Disp: , Rfl:     FEROSUL 325 (65 Fe) MG Oral Tab, Take 1 tablet (325 mg total) by mouth daily., Disp: , Rfl:     hydroCHLOROthiazide 12.5 MG Oral Tab, Take 1 tablet (12.5 mg total) by mouth daily., Disp: 90 tablet, Rfl: 1    fluticasone propionate 50 MCG/ACT Nasal Suspension, 1 spray by Nasal route daily. One spray per each nostril daily., Disp: 1 each, Rfl: 3    hydrocortisone 2.5 % External Cream, Apply 1 Application topically 2 (two) times daily., Disp: , Rfl:     triamcinolone 0.1 % External Ointment, Apply 1 Application topically 2 (two) times daily., Disp: , Rfl:     fluconazole 150 MG Oral Tab, Take 1 tablet (150 mg total) by mouth daily as needed., Disp: , Rfl:     clindamycin 2 % Vaginal Cream, Place vaginally nightly. (Patient not taking: Reported on 12/30/2024), Disp: , Rfl:     ALLERGIES:  Allergies[1]      Review of Systems:  Constitutional:  Denies fatigue, night sweats, hot flashes  Cardiovascular:  denies chest pain or palpitations  Respiratory:  denies shortness of breath  Gastrointestinal:  denies heartburn, abdominal pain, diarrhea or constipation  Genitourinary:  denies dysuria, incontinence, abnormal vaginal discharge, vaginal itching see hpi  Musculoskeletal:  denies back pain.  Skin/Breast:  Denies any breast pain, lumps, or discharge.   Neurological:  denies headaches, extremity weakness or numbness.  Psychiatric: denies depression or anxiety.  Endocrine:   denies excessive thirst or urination.  Heme/Lymph:  denies history of anemia, easy bruising or bleeding.      PHYSICAL EXAM:     Vitals:    12/30/24 0820   BP: 129/85   Pulse: 90   Weight: 153 lb 6.4 oz (69.6 kg)     Body mass index is 28.06 kg/m².     Patient offered chaperone, patient declined    Constitutional: well developed, well nourished    Psychiatric:  Oriented to time, place, person and situation. Appropriate mood and affect    Pelvic Exam:  External Genitalia: normal appearance, hair distribution, and no  lesions  Urethral Meatus:  normal in size, location, without lesions and prolapse  Bladder:  No fullness, masses or tenderness  Vagina:  Normal appearance without lesions, no abnormal discharge  Cervix:  +IUD strings, Normal without tenderness on motion  Uterus: normal in size, contour, position, mobility, without tenderness  Adnexa: normal without masses or tenderness  Perineum: normal  Anus: no hemorroids   Lymph node: no inguinal lymph nodes    Assessment & Plan:  Blake was seen today for gyn problem.    Diagnoses and all orders for this visit:    Pelvic pain  -     Urinalysis, Routine; Future  -     Urine Culture, Routine; Future    Bacterial vaginosis  -     US PELVIS W EV (CPT=76856/42785); Future    Presence of IUD  -     US PELVIS W EV (CPT=76856/44022); Future    Urinary frequency    Recurrent bacterial vaginosis - just finished treatment 2 days ago. Start probiotic daily and start boric acid vaginal suppository nightly for 30 days. Reviewed no oral intercourse while using.  Then boric acid once a week  Pelvic ultrasound ordered due to pain and urine culture due to frequency      VAHE Arredondo    This note was prepared using Dragon [employer] voice recognition dictation software. As a result errors may occur. When identified these errors have been corrected. While every attempt is made to correct errors during dictation discrepancies may still exist.         [1]   Allergies  Allergen Reactions    Aloe HIVES    Aloe Vera HIVES    Azithromycin SWELLING, ANAPHYLAXIS and UNKNOWN    Cefdinir SWELLING     Swelling of hands and face    Metronidazole SWELLING    Sudafed [Pseudoephedrine] SWELLING     Facial swelling    Lidocaine RASH

## 2025-02-04 ENCOUNTER — TELEPHONE (OUTPATIENT)
Dept: FAMILY MEDICINE CLINIC | Facility: CLINIC | Age: 35
End: 2025-02-04

## 2025-02-04 ENCOUNTER — OFFICE VISIT (OUTPATIENT)
Dept: SURGERY | Facility: CLINIC | Age: 35
End: 2025-02-04

## 2025-02-04 VITALS
HEART RATE: 75 BPM | SYSTOLIC BLOOD PRESSURE: 125 MMHG | DIASTOLIC BLOOD PRESSURE: 88 MMHG | WEIGHT: 153 LBS | BODY MASS INDEX: 28 KG/M2

## 2025-02-04 DIAGNOSIS — K64.2 GRADE III HEMORRHOIDS: Primary | ICD-10-CM

## 2025-02-04 DIAGNOSIS — K64.8 OTHER HEMORRHOIDS: Primary | ICD-10-CM

## 2025-02-04 PROCEDURE — 99212 OFFICE O/P EST SF 10 MIN: CPT | Performed by: SURGERY

## 2025-02-04 NOTE — TELEPHONE ENCOUNTER
Patient is requesting referral.     Name of specialist and specialty department : Dr. Jairo Danielle  Reason for visit with the specialist: surgery for hemorrhoids   Address of the specialist office: [place]   Appointment date: most likely in March, no exact date set yet.          [facility] informed patient the turnaround time for referral is 5-7 business days.  Patient was informed to check their [facility] account for referral status.

## 2025-02-04 NOTE — PROGRESS NOTES
Chief Complaint   Patient presents with    Hemorrhoids     Patient is here for complaints of bleeding and pain from the rectum.  States she has internal and external hemorrhoids.  Would like to discuss surgical options.         O:  /88 (BP Location: Left arm, Patient Position: Sitting, Cuff Size: adult)   Pulse 75   Wt 153 lb (69.4 kg)   LMP 12/11/2024 (Exact Date)   BMI 27.98 kg/m²   GEN:  No acute distress  Abd:  Soft, NTND  Rectal:  prolapsing internal hemorrhoids, most prominent in the right posterior position.      Assessment   1. Grade III hemorrhoids      Discussed in detail with pt and options reviewed - literature provided.  For short term treatment of pain and swelling, I recommend sitz baths and topical hydrocortisone cream and tucks pads.  Intermittent stool softener therapy and laxatives may be of benefit.  Avoid NSAIDs and blood thinners as able. The patient was also instructed to limit time sitting on the commode. We discussed the long term benefits of a high fiber diet or supplements with additional fluid intake.    We briefly discussed more invasive therapies for hemorrhoid disease, in the unlikely scenario of failed medical management.  These include banding, hemorrhoidectomy and hemorrhoid artery ligation/plication (THD procedure).    Pt wants to proceed with the THD procedure.  General anesthesia and fleets enema.           Jairo Danielle MD

## 2025-02-05 NOTE — TELEPHONE ENCOUNTER
The patient is requesting a referral for Dr. Jairo Danielle due to hemorrhoids. Referral pended for your review.

## 2025-03-09 ENCOUNTER — HOSPITAL ENCOUNTER (OUTPATIENT)
Age: 35
Discharge: HOME | End: 2025-03-09
Payer: MEDICAID

## 2025-03-09 VITALS
OXYGEN SATURATION: 100 % | DIASTOLIC BLOOD PRESSURE: 75 MMHG | HEART RATE: 83 BPM | RESPIRATION RATE: 18 BRPM | SYSTOLIC BLOOD PRESSURE: 116 MMHG | TEMPERATURE: 97 F

## 2025-03-09 DIAGNOSIS — N89.8 VAGINAL ITCHING: ICD-10-CM

## 2025-03-09 DIAGNOSIS — N89.8 VAGINAL DISCHARGE: Primary | ICD-10-CM

## 2025-03-09 LAB
B-HCG UR QL: NEGATIVE
BILIRUB UR QL STRIP: NEGATIVE
CLARITY UR: CLEAR
COLOR UR: YELLOW
GLUCOSE UR STRIP-MCNC: NEGATIVE MG/DL
HGB UR QL STRIP: NEGATIVE
KETONES UR STRIP-MCNC: NEGATIVE MG/DL
LEUKOCYTE ESTERASE UR QL STRIP: NEGATIVE
NITRITE UR QL STRIP: NEGATIVE
PH UR STRIP: 6 [PH]
PROT UR STRIP-MCNC: NEGATIVE MG/DL
SP GR UR STRIP: 1.02
UROBILINOGEN UR STRIP-ACNC: <2 MG/DL

## 2025-03-09 PROCEDURE — 99214 OFFICE O/P EST MOD 30 MIN: CPT | Performed by: PHYSICIAN ASSISTANT

## 2025-03-09 PROCEDURE — 81025 URINE PREGNANCY TEST: CPT | Performed by: PHYSICIAN ASSISTANT

## 2025-03-09 PROCEDURE — 81002 URINALYSIS NONAUTO W/O SCOPE: CPT | Performed by: PHYSICIAN ASSISTANT

## 2025-03-09 RX ORDER — METRONIDAZOLE 500 MG/1
500 TABLET ORAL 2 TIMES DAILY
Qty: 14 TABLET | Refills: 0 | Status: SHIPPED | OUTPATIENT
Start: 2025-03-09 | End: 2025-03-10

## 2025-03-09 RX ORDER — FLUCONAZOLE 150 MG/1
150 TABLET ORAL ONCE
Qty: 2 TABLET | Refills: 0 | Status: SHIPPED | OUTPATIENT
Start: 2025-03-09 | End: 2025-03-09

## 2025-03-09 NOTE — ED PROVIDER NOTES
Patient Seen in: [facility]    History   No chief complaint on file.    Stated Complaint: Eval-G    KRYSTAL Hall is a 34 year old female who presents with chief complaint of vaginal discharge and vaginal itching.  Onset 2 to 3 days ago.  Patient reports history of bacterial vaginosis and vulvovaginal yeast infection and states her symptoms are similar.  Patient denies fever, chills, abdominal pain, nausea, vomiting, diarrhea, constipation, melena, hematochezia, dysuria, hematuria, flank pain, vaginal bleeding.            Past Medical History:    Anemia    Cornea abrasion    Chemical abrasion inferior cornea, OD    Cup to disc asymmetry    OU    Essential hypertension    History of abnormal cervical Pap smear    History of pregnancy    -induced ; 2014-\"[place] Danielle Motley\" 1st degree perineal laceration - vaginal wall. Mishra was a full term live born 7 pound 9 ounce male delivered by .    Subjective visual disturbance    OU       Past Surgical History:   Procedure Laterality Date    Colposcopy, cervix w/upper adjacent vagina; w/biopsy(s), cervix        2014    Oral surgery procedure  2007    wisdom teeth extraction            Family History   Problem Relation Age of Onset    Diabetes Maternal Grandfather     Hypertension Maternal Grandfather     Diabetes Maternal Grandmother     Hypertension Maternal Grandmother        Social History     Socioeconomic History    Marital status: Single   Tobacco Use    Smoking status: Never    Smokeless tobacco: Never   Vaping Use    Vaping status: Never Used   Substance and Sexual Activity    Alcohol use: Yes     Comment: RARELY    Drug use: No     Comment: none    Sexual activity: Not Currently     Partners: Male     Birth control/protection: I.U.D.   Other Topics Concern    Caffeine Concern Yes     Comment: soda, 1-2 cans oc.       Review of Systems    Positive for stated complaint: Eval-G  Other systems are as  noted in HPI.  Constitutional and vital signs reviewed.      All other systems reviewed and negative except as noted above.    PSFH elements reviewed from today and agreed except as otherwise stated in HPI.    Physical Exam     ED Triage Vitals [03/09/25 0943]   /75   Pulse 83   Resp 18   Temp 97.4 °F (36.3 °C)   Temp src Oral   SpO2 100 %   O2 Device None (Room air)       Current:/75   Pulse 83   Temp 97.4 °F (36.3 °C) (Oral)   Resp 18   LMP 02/12/2025 (Exact Date)   SpO2 100%     PULSE OX within normal limits on room air as interpreted by this provider.        Physical Exam    Constitutional: The patient is cooperative. Appears well-developed and well-nourished.  No acute distress.  Psychological: Alert, No abnormalities of mood, affect.  Head: Normocephalic/atraumatic.  Eyes: Pupils are equal round reactive to light.  Conjunctiva are within normal limits.  ENT: Oropharynx is clear.  Mucous membranes moist.  Neck: The neck is supple.  No meningeal signs.  Chest: There is no tenderness to the chest wall.  No CVA tenderness bilaterally.  Respiratory: Respiratory effort was normal.  There is no stridor.  Air entry is equal.  Cardiovascular: Regular rate and rhythm.  Capillary refill is brisk.   Gastrointestinal: Abdomen soft, nontender, nondistended.  There is no rebound tenderness or guarding.  No organomegaly is noted. No peritoneal signs.  Normal bowel sounds.  Genitourinary: Small amount of gray, thin, homogeneous discharge present in vaginal vault.  No vaginal bleeding.  No lesions.  Os closed.  No cervical erythema or cervical motion tenderness.  No adnexal tenderness or mass bilaterally.  Uterus nontender.  Lymphatic: No gross lymphadenopathy noted.  Musculoskeletal: Musculoskeletal system is grossly intact.  There is no obvious deformity.  Neurological: Gross motor movement is intact in all 4 extremities.  Patient exhibits normal speech.  Skin: Skin is normal to inspection.  Warm and dry.  No  obvious bruising.  No obvious rash.          ED Course     Labs Reviewed   POCT PREGNANCY URINE - Normal   [facility] POCT URINALYSIS DIPSTICK   CHLAMYDIA/GONOCOCCUS, CRYS   VAGINITIS VAGINOSIS PCR PANEL       MDM     Differential diagnosis including but not limited to UTI, ureterolithiasis, urethritis, vaginitis, STI    Urine dip within normal limits.  Vaginal swabs pending.    Patient requesting treatment prior to results of pending vaginal swabs.  Patient states she has tolerated metronidazole and Diflucan in the past.  Discussed with patient if she has a nonlife-threatening medication reaction while taking metronidazole she may call this immediate care and prescription for clindamycin can be substituted.    Physical exam remained stable as previously documented.  Available results reviewed with patient.    I have given the patient instructions regarding their diagnoses, expectations, follow up, and ER precautions. I explained to the patient that emergent conditions may arise and to go to the ER for new, worsening or any persistent conditions. I've explained the importance of following up with their doctor as instructed. The patient verbalized understanding of the discharge instructions and plan.        Disposition and Plan     Clinical Impression:  1. Vaginal discharge    2. Vaginal itching        Disposition:  Discharge    Follow-up:  Nay Ronquillo, APRN  [address] [address]  [phone]    Call in 1 day  For follow-up      Medications Prescribed:  Discharge Medication List as of 3/9/2025 10:14 AM        START taking these medications    Details   metroNIDAZOLE 500 MG Oral Tab Take 1 tablet (500 mg total) by mouth 2 (two) times daily for 7 days., Normal, Disp-14 tablet, R-0

## 2025-03-10 ENCOUNTER — TELEMEDICINE (OUTPATIENT)
Dept: INTERNAL MEDICINE CLINIC | Facility: CLINIC | Age: 35
End: 2025-03-10

## 2025-03-10 ENCOUNTER — TELEPHONE (OUTPATIENT)
Dept: FAMILY MEDICINE CLINIC | Facility: CLINIC | Age: 35
End: 2025-03-10

## 2025-03-10 DIAGNOSIS — N76.0 BACTERIAL VAGINOSIS: Primary | ICD-10-CM

## 2025-03-10 DIAGNOSIS — B96.89 BACTERIAL VAGINOSIS: Primary | ICD-10-CM

## 2025-03-10 RX ORDER — CLINDAMYCIN HYDROCHLORIDE 300 MG/1
300 CAPSULE ORAL 2 TIMES DAILY
Qty: 14 CAPSULE | Refills: 0 | Status: SHIPPED | OUTPATIENT
Start: 2025-03-10 | End: 2025-03-17

## 2025-03-10 NOTE — TELEPHONE ENCOUNTER
Spoke with patient, Date of Birth verified  She stated she was seen in [facility] yesterday for BV/ vaginal discharge.   She was rx'd metronidazole but she is not comfortable taking this med due to she had swollen lip.   She called ID dept and was told the Provider that prescribed metronidazole is not in, she was advised to follow up  PCP  or go to ER.   She is requesting Clindamycin for BV.     Virtual appt made as requested, instruction given.       Future Appointments   Date Time Provider Department Center   3/10/2025  4:45 PM [facility]   4/30/2025  5:00 PM Gabriel Luther DO [facility]   10/16/2025  5:30 PM Sandi Moore MD [facility]

## 2025-03-10 NOTE — PROGRESS NOTES
Subjective:   Patient ID: Blake Hall is a 34 year old female.    HPI     Patient seen today through live two-way video visit calling asking for new prescription for medication against bacterial vaginosis she was diagnosed recently was prescribed clotrimazole but she says she is allergic to it in the past she is taking clindamycin 300 mg twice a day for 7 days and works great no side effects asking for same prescription no other complaints    History/Other:   Review of Systems   Constitutional: Negative.    HENT: Negative.     Eyes: Negative.    Respiratory: Negative.     Cardiovascular: Negative.    Gastrointestinal: Negative.    Genitourinary: Negative.         Positive for bacterial vaginosis      Musculoskeletal: Negative.    Skin: Negative.    Neurological: Negative.    Psychiatric/Behavioral: Negative.       Current Outpatient Medications   Medication Sig Dispense Refill    clindamycin 300 MG Oral Cap Take 1 capsule (300 mg total) by mouth in the morning and 1 capsule (300 mg total) before bedtime. Do all this for 7 days. 14 capsule 0    clindamycin 2 % Vaginal Cream Place vaginally nightly.      Terconazole 0.8 % Vaginal Cream Place 1 applicator vaginally nightly as needed. Use daily as needed for irritation related to period. 20 g 1    Prenatal Vit-Fe Fumarate-FA (PRENATAL VITAMINS) 28-0.8 MG Oral Tab Take 1 tablet by mouth daily.      Phentermine HCl 15 MG Oral Cap Take 1 capsule (15 mg total) by mouth.      FEROSUL 325 (65 Fe) MG Oral Tab Take 1 tablet (325 mg total) by mouth daily.      hydroCHLOROthiazide 12.5 MG Oral Tab Take 1 tablet (12.5 mg total) by mouth daily. 90 tablet 1    fluticasone propionate 50 MCG/ACT Nasal Suspension 1 spray by Nasal route daily. One spray per each nostril daily. 1 each 3    hydrocortisone 2.5 % External Cream Apply 1 Application topically 2 (two) times daily.      triamcinolone 0.1 % External Ointment Apply 1 Application topically 2 (two) times daily.       fluconazole 150 MG Oral Tab Take 1 tablet (150 mg total) by mouth daily as needed.       Allergies:Allergies[1]    Objective:   Physical Exam  Constitutional:       Appearance: She is not ill-appearing.   Pulmonary:      Effort: No respiratory distress.   Neurological:      Mental Status: She is oriented to person, place, and time.         Assessment & Plan:   1. Bacterial vaginosis patient allergic to metronidazole sent clindamycin take as prescribed let us know if not better   21 min spent    No orders of the defined types were placed in this encounter.      Meds This Visit:  Requested Prescriptions     Signed Prescriptions Disp Refills    clindamycin 300 MG Oral Cap 14 capsule 0     Sig: Take 1 capsule (300 mg total) by mouth in the morning and 1 capsule (300 mg total) before bedtime. Do all this for 7 days.       Imaging & Referrals:  None         [1]   Allergies  Allergen Reactions    Aloe HIVES    Aloe Vera HIVES    Azithromycin SWELLING, ANAPHYLAXIS and UNKNOWN    Cefdinir SWELLING     Swelling of hands and face    Metronidazole SWELLING    Sudafed [Pseudoephedrine] SWELLING     Facial swelling    Lidocaine RASH

## 2025-04-30 ENCOUNTER — OFFICE VISIT (OUTPATIENT)
Dept: OBGYN CLINIC | Facility: CLINIC | Age: 35
End: 2025-04-30

## 2025-04-30 VITALS
BODY MASS INDEX: 28 KG/M2 | SYSTOLIC BLOOD PRESSURE: 121 MMHG | HEART RATE: 71 BPM | WEIGHT: 152.63 LBS | DIASTOLIC BLOOD PRESSURE: 85 MMHG

## 2025-04-30 DIAGNOSIS — Z01.419 ENCOUNTER FOR GYNECOLOGICAL EXAMINATION WITHOUT ABNORMAL FINDING: Primary | ICD-10-CM

## 2025-04-30 DIAGNOSIS — Z11.3 SCREENING FOR VENEREAL DISEASE: ICD-10-CM

## 2025-04-30 PROCEDURE — 99395 PREV VISIT EST AGE 18-39: CPT | Performed by: OBSTETRICS & GYNECOLOGY

## 2025-04-30 NOTE — PROGRESS NOTES
The following individual(s) verbally consented to be recorded using ambient AI listening technology and understand that they can each withdraw their consent to this listening technology at any point by asking the clinician to turn off or pause the recording:    Patient name: Blake Hall  Additional names:

## 2025-04-30 NOTE — PROGRESS NOTES
Subjective:   Patient ID: Blake Hall is a 35 year old female.    HPI    History of Present Illness  Blake Hall is a 34 year old female who presents annual exam with recurrent bacterial vaginosis and premenstrual headaches.    She experiences recurrent bacterial vaginosis, often triggered by sexual activity, but symptoms also occur around her menstrual cycle even without recent sexual activity. Boric acid was effective when used nightly for a month, but symptoms recurred after stopping. She is allergic to metronidazole tablets, which cause facial and lip swelling, but she tolerates metronidazole gel. Clindamycin tablets have been ineffective.    Premenstrual headaches begin around the time her cycle is due and last about five days. Midol provides effective relief. Her menstrual cycle is regular, occurring every 28 to 30 days, with seven days of bleeding. She does not experience significant pain, heavy flow, intermenstrual, or postcoital bleeding.        History/Other:   Review of Systems   Constitutional:  Negative for appetite change, fatigue and unexpected weight change.   Eyes:  Negative for visual disturbance.   Respiratory:  Negative for cough and shortness of breath.    Cardiovascular:  Negative for chest pain, palpitations and leg swelling.   Gastrointestinal:  Negative for abdominal distention, abdominal pain, blood in stool, constipation and diarrhea.   Genitourinary:  Negative for dyspareunia, dysuria, frequency, menstrual problem, pelvic pain and urgency.   Musculoskeletal:  Negative for arthralgias and myalgias.   Skin:  Negative for rash.   Neurological:  Negative for weakness, numbness and headaches.   Psychiatric/Behavioral:  Negative for dysphoric mood. The patient is not nervous/anxious.      Current Outpatient Medications   Medication Sig Dispense Refill    clindamycin 2 % Vaginal Cream Place vaginally nightly.      Prenatal Vit-Fe Fumarate-FA (PRENATAL VITAMINS) 28-0.8 MG  Oral Tab Take 1 tablet by mouth daily.      Phentermine HCl 15 MG Oral Cap Take 1 capsule (15 mg total) by mouth.      FEROSUL 325 (65 Fe) MG Oral Tab Take 1 tablet (325 mg total) by mouth daily.      fluticasone propionate 50 MCG/ACT Nasal Suspension 1 spray by Nasal route daily. One spray per each nostril daily. 1 each 3    fluconazole 150 MG Oral Tab Take 1 tablet (150 mg total) by mouth daily as needed.      Terconazole 0.8 % Vaginal Cream Place 1 applicator vaginally nightly as needed. Use daily as needed for irritation related to period. 20 g 5    hydrocortisone 2.5 % External Cream Apply 1 Application topically 2 (two) times daily. 453.6 g 0    triamcinolone 0.1 % External Ointment Apply 1 Application topically 2 (two) times daily. 80 g 3    Rizatriptan Benzoate 5 MG Oral Tab Take 1 tablet (5 mg total) by mouth as needed for Migraine. Can repeat after 2 hours if needed. Max 2 tablets per day. 20 tablet 1    hydroCHLOROthiazide 12.5 MG Oral Tab Take 1 tablet (12.5 mg total) by mouth daily. 90 tablet 1     Allergies:  Allergies   Allergen Reactions    Aloe HIVES    Aloe Vera HIVES    Azithromycin SWELLING, ANAPHYLAXIS and UNKNOWN    Cefdinir SWELLING     Swelling of hands and face    Metronidazole SWELLING    Sudafed [Pseudoephedrine] SWELLING     Facial swelling    Lidocaine RASH       Objective:   Physical Exam  Constitutional:       General: She is not in acute distress.     Appearance: She is well-developed. She is not diaphoretic.   Neck:      Thyroid: No thyromegaly.   Cardiovascular:      Rate and Rhythm: Normal rate and regular rhythm.      Heart sounds: Normal heart sounds. No murmur heard.  Pulmonary:      Effort: Pulmonary effort is normal.      Breath sounds: Normal breath sounds. No wheezing or rales.   Chest:   Breasts:     Right: Normal. No mass, nipple discharge, skin change or tenderness.      Left: Normal. No mass, nipple discharge, skin change or tenderness.      Comments:     Abdominal:       General: There is no distension.      Palpations: Abdomen is soft. There is no mass.      Tenderness: There is no abdominal tenderness. There is no guarding or rebound.   Genitourinary:     Labia:         Right: No rash or lesion.         Left: No rash or lesion.       Vagina: Normal. No vaginal discharge.      Cervix: No cervical motion tenderness or discharge.      Uterus: Not enlarged and not tender.       Adnexa:         Right: No mass, tenderness or fullness.          Left: No mass, tenderness or fullness.     Musculoskeletal:         General: No tenderness.      Cervical back: Neck supple.   Lymphadenopathy:      Cervical: No cervical adenopathy.      Upper Body:      Right upper body: No supraclavicular, axillary or pectoral adenopathy.      Left upper body: No supraclavicular, axillary or pectoral adenopathy.   Neurological:      Mental Status: She is alert.         Assessment & Plan:   1. Encounter for gynecological examination without abnormal finding    2. Screening for venereal disease        Orders Placed This Encounter   Procedures    Trichomonas vaginalis, CRYS (Vaginal/Cervical)    Chlamydia/Gc Amplification    Vaginitis Vaginosis PCR Panel       Meds This Visit:  Requested Prescriptions      No prescriptions requested or ordered in this encounter       Imaging & Referrals:  None

## 2025-05-02 LAB — T VAGINALIS RRNA SPEC QL NAA+PROBE: NEGATIVE

## 2025-05-31 DIAGNOSIS — I10 HYPERTENSION, UNSPECIFIED TYPE: ICD-10-CM

## 2025-06-02 RX ORDER — HYDROCHLOROTHIAZIDE 12.5 MG/1
12.5 TABLET ORAL DAILY
Qty: 90 TABLET | Refills: 1 | Status: SHIPPED | OUTPATIENT
Start: 2025-06-02

## 2025-06-02 NOTE — TELEPHONE ENCOUNTER
Refill passed per Clinic protocol.  Requested Prescriptions   Pending Prescriptions Disp Refills    HYDROCHLOROTHIAZIDE 12.5 MG Oral Tab [Pharmacy Med Name: HYDROCHLOROTHIAZIDE 12.5MG TABLETS] 90 tablet 1     Sig: TAKE 1 TABLET(12.5 MG) BY MOUTH DAILY       Hypertension Medications Protocol Passed - 6/2/2025 11:55 AM        Passed - CMP or BMP in past 12 months        Passed - Last BP reading less than 140/90     BP Readings from Last 1 Encounters:   04/30/25 121/85               Passed - In person appointment or virtual visit in the past 12 mos or appointment in next 3 mos     Recent Outpatient Visits              1 month ago Encounter for gynecological examination without abnormal finding    [facility] Gabriel Luther DO    Office Visit    2 months [facility]    [facility]medicine    3 months [facility]    [facility]    5 months ago Pelvic pain    [facility]Nay Srivastava APRN    Office Visit    5 months ago [facility]    [facility]          Future Appointments         Provider Department Appt Notes    In 4 months [facility]     In 11 months Gabriel Luther DO [facility]                     Passed - EGFRCR or GFRAA > 50     GFR Evaluation  EGFRCR: 79 , resulted on 10/10/2024          Passed - Medication is active on med list

## 2025-06-25 ENCOUNTER — TELEMEDICINE (OUTPATIENT)
Dept: FAMILY MEDICINE CLINIC | Facility: CLINIC | Age: 35
End: 2025-06-25
Payer: MEDICAID

## 2025-06-25 DIAGNOSIS — E66.9 OBESITY (BMI 30-39.9): ICD-10-CM

## 2025-06-25 DIAGNOSIS — K64.4 EXTERNAL HEMORRHOIDS: Primary | ICD-10-CM

## 2025-06-25 DIAGNOSIS — N89.8 VAGINAL DISCHARGE: ICD-10-CM

## 2025-06-25 DIAGNOSIS — G43.909 MIGRAINE WITHOUT STATUS MIGRAINOSUS, NOT INTRACTABLE, UNSPECIFIED MIGRAINE TYPE: ICD-10-CM

## 2025-06-25 PROCEDURE — 99213 OFFICE O/P EST LOW 20 MIN: CPT | Performed by: STUDENT IN AN ORGANIZED HEALTH CARE EDUCATION/TRAINING PROGRAM

## 2025-06-25 RX ORDER — TRIAMCINOLONE ACETONIDE 1 MG/G
1 OINTMENT TOPICAL 2 TIMES DAILY
Qty: 80 G | Refills: 3 | Status: SHIPPED | OUTPATIENT
Start: 2025-06-25

## 2025-06-25 RX ORDER — RIZATRIPTAN BENZOATE 5 MG/1
5 TABLET ORAL AS NEEDED
Qty: 20 TABLET | Refills: 1 | Status: SHIPPED | OUTPATIENT
Start: 2025-06-25

## 2025-06-25 RX ORDER — HYDROCORTISONE 25 MG/G
1 CREAM TOPICAL 2 TIMES DAILY
Qty: 453.6 G | Refills: 0 | Status: SHIPPED | OUTPATIENT
Start: 2025-06-25

## 2025-06-25 RX ORDER — TERCONAZOLE 8 MG/G
1 CREAM VAGINAL NIGHTLY PRN
Qty: 20 G | Refills: 5 | Status: SHIPPED | OUTPATIENT
Start: 2025-06-25

## 2025-06-25 NOTE — PROGRESS NOTES
Virtual Telephone Check-In    Blake Hall verbally consents to a Virtual/Telephone Check-In visit on 06/25/25.  Patient has been referred to the [facility] website at [email or web address] to review the yearly Consent to Treat document.    Patient understands and accepts financial responsibility for any deductible, co-insurance and/or co-pays associated with this service.    Duration of the service: 10 minutes      Summary of topics discussed:   refills      Sandi Moore MD    [facility]  Telehealth Verbal Consent   I conducted a telehealth visit with Blake Hall today, 06/25/25, which was completed using two-way, real-time interactive audio and video communication. This has been done in good michelle to provide continuity of care in the best interest of the provider-patient relationship, due to the COVID -19 public health crisis/national emergency where restrictions of face-to-face office visits are ongoing. Every conscious effort was taken to allow for sufficient and adequate time to complete the visit.  The patient was made aware of the limitations of the telehealth visit, including treatment limitations as no physical exam could be performed.  The patient was advised to call 911 or to go to the ER in case there was an emergency.  The patient was also advised of the potential privacy & security concerns related to the telehealth platform.   The patient was made aware of where to find [facility]'s notice of privacy practices, telehealth consent form and other related consent forms and documents.  which are located on the [facility] website. The patient verbally agreed to telehealth consent form, related consents and the risks discussed.    Lastly, the patient confirmed that they were in [place].   Included in this visit, time may have been spent reviewing labs, medications, radiology tests and decision making. Appropriate medical decision-making and tests are ordered as detailed in  the plan of care above.  Coding/billing information is submitted for this visit based on complexity of care and/or time spent for the visit.    HPI:    Patient ID: Blake Hall is a 35 year old female.    HPI  Pt presenting via video visit for followup.    Recent migraine  Last episode 6/14    Reports recurrent vaginal irritation    Requesting referral for weight loss specialist        Review of Systems   A comprehensive 10 point review of systems was completed.  Pertinent positives and negatives noted in the the HPI.     Current Medications[1]  Allergies:Allergies[2]   There were no vitals filed for this visit.    There is no height or weight on file to calculate BMI.   PHYSICAL EXAM:   Physical Exam   Vitals signs taken at home if available:     Limited examination for this virtual visit     Patient was speaking in complete sentences, no increased work of breathing and very coherent and alert on the phone.  Alert and oriented x 3  Patient was responding to questions appropriately.  Patient did not appear short of breath.         ASSESSMENT/PLAN:   1. Vaginal discharge  - discussed PRN treatment  - continue gentle hygiene  - to call with any questions/concerns  - Terconazole 0.8 % Vaginal Cream; Place 1 applicator vaginally nightly as needed. Use daily as needed for irritation related to period.  Dispense: 20 g; Refill: 5    2. External hemorrhoids  - encouraged to continue water and fiber intake as tolerated  - avoid straining and sitting on toilet for prolonged periods  - gentle cleansing as able  - discussed conservative measures, including witch hazel, topical hydrocortisone, sitz baths  - to call with any questions or concerns  - hydrocortisone 2.5 % External Cream; Apply 1 Application topically 2 (two) times daily.  Dispense: 453.6 g; Refill: 0  - triamcinolone 0.1 % External Ointment; Apply 1 Application topically 2 (two) times daily.  Dispense: 80 g; Refill: 3    3. Obesity (BMI 30-39.9)  -  Specialty Other Referral - In Network    4. Migraine without status migrainosus, not intractable, unspecified migraine type  Discussed treatment options including increased hydration and sleep, vitamin supplementation, abortives  - increase hydration and rest as tolerated  - will start riboflavin and magnesium dosing as discussed  - triptan tial  - continue Tylenol/NSAIDs as needed  - discussed red flags for urgent reevaluation  - to call with any questions/concerns  - Rizatriptan Benzoate 5 MG Oral Tab; Take 1 tablet (5 mg total) by mouth as needed for Migraine. Can repeat after 2 hours if needed. Max 2 tablets per day.  Dispense: 20 tablet; Refill: 1    Pt verbalized understanding and agrees with plan.    No orders of the defined types were placed in this encounter.      Meds This Visit:  Requested Prescriptions     Signed Prescriptions Disp Refills    Terconazole 0.8 % Vaginal Cream 20 g 5     Sig: Place 1 applicator vaginally nightly as needed. Use daily as needed for irritation related to period.    hydrocortisone 2.5 % External Cream 453.6 g 0     Sig: Apply 1 Application topically 2 (two) times daily.    triamcinolone 0.1 % External Ointment 80 g 3     Sig: Apply 1 Application topically 2 (two) times daily.    Rizatriptan Benzoate 5 MG Oral Tab 20 tablet 1     Sig: Take 1 tablet (5 mg total) by mouth as needed for Migraine. Can repeat after 2 hours if needed. Max 2 tablets per day.       Imaging & Referrals:  SPECIALTY (OTHER) - INTERNAL         ID#[id number]           [1]   Current Outpatient Medications   Medication Sig Dispense Refill    Terconazole 0.8 % Vaginal Cream Place 1 applicator vaginally nightly as needed. Use daily as needed for irritation related to period. 20 g 5    hydrocortisone 2.5 % External Cream Apply 1 Application topically 2 (two) times daily. 453.6 g 0    triamcinolone 0.1 % External Ointment Apply 1 Application topically 2 (two) times daily. 80 g 3    Rizatriptan Benzoate 5 MG Oral Tab  Take 1 tablet (5 mg total) by mouth as needed for Migraine. Can repeat after 2 hours if needed. Max 2 tablets per day. 20 tablet 1    hydroCHLOROthiazide 12.5 MG Oral Tab Take 1 tablet (12.5 mg total) by mouth daily. 90 tablet 1    clindamycin 2 % Vaginal Cream Place vaginally nightly.      Prenatal Vit-Fe Fumarate-FA (PRENATAL VITAMINS) 28-0.8 MG Oral Tab Take 1 tablet by mouth daily.      Phentermine HCl 15 MG Oral Cap Take 1 capsule (15 mg total) by mouth.      FEROSUL 325 (65 Fe) MG Oral Tab Take 1 tablet (325 mg total) by mouth daily.      fluticasone propionate 50 MCG/ACT Nasal Suspension 1 spray by Nasal route daily. One spray per each nostril daily. 1 each 3    fluconazole 150 MG Oral Tab Take 1 tablet (150 mg total) by mouth daily as needed.     [2]   Allergies  Allergen Reactions    Aloe HIVES    Aloe Vera HIVES    Azithromycin SWELLING, ANAPHYLAXIS and UNKNOWN    Cefdinir SWELLING     Swelling of hands and face    Metronidazole SWELLING    Sudafed [Pseudoephedrine] SWELLING     Facial swelling    Lidocaine RASH

## 2025-06-26 ENCOUNTER — TELEPHONE (OUTPATIENT)
Dept: FAMILY MEDICINE CLINIC | Facility: CLINIC | Age: 35
End: 2025-06-26

## 2025-06-26 NOTE — TELEPHONE ENCOUNTER
Medications - Current[1]    Rx: Hydrocortisone 2.5% cream 454gm       [1]   Current Outpatient Medications:     Terconazole 0.8 % Vaginal Cream, Place 1 applicator vaginally nightly as needed. Use daily as needed for irritation related to period., Disp: 20 g, Rfl: 5    hydrocortisone 2.5 % External Cream, Apply 1 Application topically 2 (two) times daily., Disp: 453.6 g, Rfl: 0    triamcinolone 0.1 % External Ointment, Apply 1 Application topically 2 (two) times daily., Disp: 80 g, Rfl: 3    Rizatriptan Benzoate 5 MG Oral Tab, Take 1 tablet (5 mg total) by mouth as needed for Migraine. Can repeat after 2 hours if needed. Max 2 tablets per day., Disp: 20 tablet, Rfl: 1    hydroCHLOROthiazide 12.5 MG Oral Tab, Take 1 tablet (12.5 mg total) by mouth daily., Disp: 90 tablet, Rfl: 1    clindamycin 2 % Vaginal Cream, Place vaginally nightly., Disp: , Rfl:     Prenatal Vit-Fe Fumarate-FA (PRENATAL VITAMINS) 28-0.8 MG Oral Tab, Take 1 tablet by mouth daily., Disp: , Rfl:     Phentermine HCl 15 MG Oral Cap, Take 1 capsule (15 mg total) by mouth., Disp: , Rfl:     FEROSUL 325 (65 Fe) MG Oral Tab, Take 1 tablet (325 mg total) by mouth daily., Disp: , Rfl:     fluticasone propionate 50 MCG/ACT Nasal Suspension, 1 spray by Nasal route daily. One spray per each nostril daily., Disp: 1 each, Rfl: 3    fluconazole 150 MG Oral Tab, Take 1 tablet (150 mg total) by mouth daily as needed., Disp: , Rfl:

## 2025-06-26 NOTE — TELEPHONE ENCOUNTER
Closed   6/26/2025  2:02 PM  Close reason: Prior Authorization not required for patient/medication

## 2025-07-30 ENCOUNTER — OFFICE VISIT (OUTPATIENT)
Dept: DERMATOLOGY CLINIC | Facility: CLINIC | Age: 35
End: 2025-07-30

## 2025-07-30 DIAGNOSIS — Z51.81 MEDICATION MONITORING ENCOUNTER: ICD-10-CM

## 2025-07-30 DIAGNOSIS — L70.0 ACNE VULGARIS: Primary | ICD-10-CM

## 2025-07-30 PROCEDURE — 99204 OFFICE O/P NEW MOD 45 MIN: CPT | Performed by: STUDENT IN AN ORGANIZED HEALTH CARE EDUCATION/TRAINING PROGRAM

## 2025-07-30 RX ORDER — SUMATRIPTAN SUCCINATE 100 MG/1
TABLET ORAL
COMMUNITY
Start: 2025-05-21

## 2025-07-30 RX ORDER — METRONIDAZOLE 7.5 MG/G
GEL VAGINAL
COMMUNITY
Start: 2025-07-21

## 2025-07-30 RX ORDER — DOXYCYCLINE 100 MG/1
CAPSULE ORAL
Qty: 60 CAPSULE | Refills: 1 | Status: SHIPPED | OUTPATIENT
Start: 2025-07-30

## 2025-07-30 RX ORDER — BACITRACIN ZINC 500 [USP'U]/G
OINTMENT TOPICAL
COMMUNITY
Start: 2025-05-09

## 2025-07-30 RX ORDER — SPIRONOLACTONE 50 MG/1
TABLET, FILM COATED ORAL
Qty: 60 TABLET | Refills: 2 | Status: SHIPPED | OUTPATIENT
Start: 2025-07-30

## 2025-07-30 RX ORDER — MUPIROCIN 2 %
OINTMENT (GRAM) TOPICAL
COMMUNITY
Start: 2025-03-06

## (undated) NOTE — LETTER
[address]  [address] [place], [place]      Authorization for Surgical Operation and Procedure     Date:___________                                                                                                         Time:_______ potential risks that can occur: fever and allergic reactions, hemolytic reactions, transmission of diseases such as Hepatitis, AIDS and Cytomegalovirus (CMV) and fluid overload.   In the event that I wish to have an autologous transfusion of my own blood, o attending physician will determine when the applicable recovery period ends for purposes of reinstating the DNAR order.   10. Patients having a sterilization procedure: I understand that if the procedure is successful the results will be permanent and it wi _______________________________________________________________ _____________________________  Olga Santos Physician)                                                                                         (Date)                                   (Time)

## (undated) NOTE — LETTER
9/11/2017          To Whom It May Concern:    Tyra Kennyvenus is currently under my medical care. She may work with no restrictions. If you require additional information please contact our office.         Sincerely,    Jessica Giordano MD

## (undated) NOTE — LETTER
Date & Time: 7/8/2024, 12:32 AM  Patient: Blake Hall  Encounter Provider(s):    Maricruz Marx MD       To Whom It May Concern:    Blake Hall was seen and treated in our department on 7/7/2024. She can return to work on 07/09/2024.    If you have any questions or concerns, please do not hesitate to call.        _____________________________  Physician/APC Signature

## (undated) NOTE — LETTER
Date & Time: 5/7/2021, 3:47 AM  Patient: Nely Sotelo Love  Encounter Provider(s):    Earnest Holguin MD       To Whom It May Concern:    Yesenia Groves was seen and treated in our department on 5/7/2021.  She should not return to work until 5/09/

## (undated) NOTE — LETTER
[facility]  Administration of Anesthesia  1.  I, Elmira Hall, or _________________________________ acting on her behalf, (Patient) (Dependent/Representative) request to receive anesthesia for my pending procedure/operation/treatm infections, high spinal block, spinal bleeding, seizure, cardiac arrest and death. 7. AWARENESS: I understand that it is possible (but unlikely) to have explicit memory of events from the operating room while under general anesthesia.   8. ELECTROCONVULSIV unconscious pt /Relationship    My signature below affirms that prior to the time of the procedure, I have explained to the patient and/or his/her guardian, the risks and benefits of undergoing anesthesia, as well as any reasonable alternatives.     _______

## (undated) NOTE — ED AVS SNAPSHOT
Meme Beck   MRN: [id number]    Department:  [facility] Emergency Department   Date of Visit:  4/9/2019           Disclosure     Insurance plans vary and the physician(s) referred by the ER may not be covered by your plan.  Please contact CARE PHYSICIAN AT ONCE OR RETURN IMMEDIATELY TO THE EMERGENCY DEPARTMENT. If you have been prescribed any medication(s), please fill your prescription right away and begin taking the medication(s) as directed.   If you believe that any of the medications

## (undated) NOTE — MR AVS SNAPSHOT
[facility]  [address]  [phone]               Thank you for choosing us for your health care visit with Kar Gonzalez. DO [place].   We are glad to serve you and happy to provide you with this summ 2 times daily. Commonly known as:  NIZORAL           methylPREDNISolone 4 MG Tbpk   As directed. Commonly known as:  MEDROL           Norgestim-Eth Estrad Triphasic 0.18/0.215/0.25 MG-35 MCG Tabs   Take 1 tablet by mouth daily.    Commonly known as:  TR active are less likely to develop some chronic diseases than adults who are inactive.      HOW TO GET STARTED: HOW TO STAY MOTIVATED:   Start activities slowly and build up over time Do what you like   Get your heart pumping – brisk walking, biking, swimmin

## (undated) NOTE — LETTER
Dear new mom:    We've missed you! The nurses of [facility] have tried to reach you by phone to ask if you had any questions regarding your health or the care of your new little one.     Please feel free to call your doctor with

## (undated) NOTE — LETTER
10/26/2020              [facility] Hall        [facility] [place] [address]         To Whom It May Concern,    My patient, Sulema Rodriguez, was seen and evaluated on 10/24/2020.  Due to her ongoing symptoms and recent medication beau

## (undated) NOTE — LETTER
[address]      Authorization for Surgical Operation and Procedure     Date:8/27/2020                                                                                                        Time:__________ potential risks that can occur: fever and allergic reactions, hemolytic reactions, transmission of diseases such as Hepatitis, AIDS and Cytomegalovirus (CMV) and fluid overload.   In the event that I wish to have an autologous transfusion of my own blood, o attending physician will determine when the applicable recovery period ends for purposes of reinstating the DNAR order.   10. Patients having a sterilization procedure: I understand that if the procedure is successful the results will be permanent and it wi _______________________________________________________________ _____________________________  [facility] Physician)                                                                                         (Date)                                   (Time)

## (undated) NOTE — LETTER
Date: 8/15/2022    Patient Name: Matthew Germain          To Whom it may concern: This letter has been written at the patient's request. The above patient was seen at the [facility] for treatment of a medical condition. This patient should be excused from attending work 8/11/22, 8/12/22, 8/15/22 and 8/16/22. May return to work 8/17/22.         Sincerely,      Jose Salcedo PA-C

## (undated) NOTE — MR AVS SNAPSHOT
[facility] SPECIALTY [facility] [place] [address] [address]  [phone]               Thank you for choosing us for your health care visit with Gary Sanon MD.  We are glad to serve you and happy to provide you with this summary of [email or web address]

## (undated) NOTE — LETTER
11/25/2020              Jacqueline Santillan [facility]        [address] [address]         To Whom It May Concern,    My patient, Almita Diaz, was seen and evaluated in our office on 11/25/2020.   Due to an ongoing condition and recent

## (undated) NOTE — LETTER
11/26/18        Natalia MARTINEZ [facility]  [address] [facility]      Dear Mary Rodriguez records indicate that you have outstanding lab work and or testing that was ordered for you and has not yet been completed:  Orders Placed This Encounter

## (undated) NOTE — LETTER
Date: 12/12/2022    Patient Name: Dalton Tirado          To Whom it may concern: This letter has been written at the patient's request. The above patient was seen at the [facility] for treatment of a medical condition. This patient should be excused from attending work per [employer] guidelines regarding COVID. Day five of illness for patient 12/16/22, excused from work until then, CDC guidelines through at least Day 8. Covid test positive at home, lab test pending.      Sincerely,      Shayna Ordoñez PA-C

## (undated) NOTE — LETTER
AUTHORIZATION FOR SURGICAL OPERATION OR OTHER PROCEDURE    1.  I hereby authorize Dr. Roman Kaminski and [facility] staff assigned to my case to perform the following operation and/or procedure at the [facility]:    ___________________________ Patient Name:  ______________________________________________________  (please print)      Patient signature:  ___________________________________________________             Relationship to Patient:           []  Parent    Responsible person

## (undated) NOTE — LETTER
Date: 2/25/2019    Patient Name: Caryn Hall          To Whom it may concern: This letter has been written at the patient's request. The above patient was seen at the [facility] for treatment of a medical condition.     This patient sh

## (undated) NOTE — LETTER
6/4/2018              Blake Hall        [address] [address]         To whom it may concern,    Anatoly Alfaro was seen in the office today due to illness. Please excuse her from work 6/5/18.       Sincerely,        [facility]

## (undated) NOTE — MR AVS SNAPSHOT
[place]  [address]  [phone]               Thank you for choosing us for your health care visit with Adan Nunez MD.  We are glad to serve you and happy to provide you with this wyatt insurance company's guidelines for prior authorization for this test.  You may be held responsible for payment in full if proper authorization is not acquired.   Please contact the Patient Business Office at [phone] if you have any questions related to Current Medications          This list is accurate as of: 5/26/17 12:22 PM.  Always use your most recent med list.                Cyclobenzaprine HCl 10 MG Tabs   Take 1 tablet (10 mg total) by mouth 3 (three) times daily as needed for Muscle spasms.    Com

## (undated) NOTE — LETTER
Date & Time: 8/16/2021, 10:25 AM  Patient: Anju Hall  Encounter Provider(s):    [facility] [place] [facility]       To Whom It May Concern:    Terrence Garcia was seen and treated in our department on 8/16/2021.  She should not return to work until 08/

## (undated) NOTE — LETTER
Date & Time: 9/23/2024, 10:34 PM  Patient: Blake Hall  Encounter Provider(s):    Sarmad Shafer MD       To Whom It May Concern:    Blake Hall was seen and treated in our department on 9/23/2024. She should not return to work until 9/26/24 .    If you have any questions or concerns, please do not hesitate to call.        _____________________________  RN Signature

## (undated) NOTE — LETTER
5/12/2021              Jacqueline Humdana Love        [facility]        [place] [address]         To Whom It May Concern,    Almita Diaz was seen and evaluated in our office on 5/12/2021.   Based on her current clinical status, she may return

## (undated) NOTE — MR AVS SNAPSHOT
After Visit Summary   3/1/2021    Von Gaytan    MRN: [id number]           Visit Information     Date & Time  3/1/2021 11:00 AM Provider  Lenoard Leyden, DO Department  [facility] Health, [address], [employer]  Kirby CHLAMYDIA/GONOCOCCUS, CRYS [[id number] CUSTOM]  3/1/2021 3/1/2022    HPV HIGH RISK , THIN PREP COLLECTION [SHP1826 CUSTOM]  3/1/2021 3/1/2022    THINPREP PAP SMEAR B [PMP7106 CUSTOM]  3/1/2021 3/1/2022    TRICH VAG BY CRYS [CUSTOM CPT(R)]  3/1/2021 (Approximat Saturday – Sunday  8:00 am – 4:00 pm    WALK-IN CARE  Primary Care Providers  Treatment for acute illness   or injury that are   non-life-threatening.   Also available by appointment     Average cost  $70*       [facility]    Kristina Pathak

## (undated) NOTE — MR AVS SNAPSHOT
[place]  [address]  [phone]               Thank you for choosing us for your health care visit with Tonya Ford MD.  We are glad to serve you and happy to provide you with this wyatt [facility]    It is the patient's responsibility to check with and follow their insurance company's guidelines for prior authorization for this test.  You may be held responsible for payment in full if Instructions and Information about Your Health     None      Allergies as of Jun 09, 2017     Herminio Shepherd                   Current Medications          This list is accurate as of: 6/9/17  8:38 AM.  Always use your most recent med list.

## (undated) NOTE — LETTER
4/1/2020               To Whom It May Concern,        Almita Diaz is currently under my medical care for pregnancy and has an estimated due date of 9/4/2020.          Sincerely,    Remigio Whiting, DO  [address]

## (undated) NOTE — MR AVS SNAPSHOT
[facility] [place] [address] [address]  [phone]               Thank you for choosing us for your health care visit with [facility].   We are glad to serve you and happy to provide you with this summary of Jed MARSH [address] ([address] [facility])  [address] [place], [address] [facility] [address] ([address] [facility])  Jori [place], [address] [place] [address] Instructions:   To schedule a test at any [facility], call Central Scheduling at [phone], Monday through Friday between 7:30am to 6pm and on Saturday between 8am and 1pm. Evening and weekend appointments for your exam are a insurance company's guidelines for prior authorization for this test.  You may be held responsible for payment in full if proper authorization is not acquired.   Please contact the Patient Business Office at [phone] if you have any questions related to routine healing, subsequent encounter    -  Primary    Cervicalgia        Pain in thoracic spine        Lumbar strain, subsequent encounter        Head injury, subsequent encounter          Instructions and Information about Your Health     None      Aller

## (undated) NOTE — LETTER
8/11/2017          To Whom It May Concern:    Agustin Laguerre is currently under my medical care and may return to work without restrictions on Monday, 08/14/17. If you require additional information please contact our office.         Sincerely,    [place]

## (undated) NOTE — LETTER
Date & Time: 9/16/2021, 10:04 AM  Patient: Kenia Valle Love  Encounter Provider(s):    [facility]       To Whom It May Concern:    Mickey Namrata was seen and treated in our department on 9/16/2021. Please excuse from work today.   If you

## (undated) NOTE — LETTER
12/23/2020              Diane Ruvalcaba Love        [facility]        [place] [address]         To Whom It May Concern,    My patient, Daniel Buckner, was seen and evaluated on 12/23/2020.   Based on her clinical improvement, she has been doreen

## (undated) NOTE — MR AVS SNAPSHOT
[place]  [address]  [phone]               Thank you for choosing us for your health care visit with Juan José Valencia MD.  We are glad to serve you and happy to provide you with this wyatt [facility]  Diagnostics Main [facility] [place], operated by [facility]) ([address])  [address]   [address] [facility],  Rickey Nilesh [place], [address] [facility]  [address]  Main Instructions:   To schedule a test at any [facility], call Central Scheduling at [phone], Monday through Friday between 7:30am to 6pm and on Saturday between 8am and 1pm. Evening and weekend appointments for your exam are a requirements for authorization, please wait 5-7 days and then contact your physician's office. At that time, you will be provided with any authorization numbers or be assured that none are required. You can then schedule your appointment.  Failure to obtain Right ankle pain, unspecified chronicity          Instructions and Information about Your Health     None      Allergies as of May 19, 2017     Herminio Shepherd                   Current Medications          This list is accurate as of: 5/19/17 12:43 PM.

## (undated) NOTE — ED AVS SNAPSHOT
Hood Eric   MRN: [id number]    Department:  [facility] Emergency Department   Date of Visit:  4/9/2018           Disclosure     Insurance plans vary and the physician(s) referred by the ER may not be covered by your plan.  Please contact CARE PHYSICIAN AT ONCE OR RETURN IMMEDIATELY TO THE EMERGENCY DEPARTMENT. If you have been prescribed any medication(s), please fill your prescription right away and begin taking the medication(s) as directed.   If you believe that any of the medications
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
## (undated) NOTE — LETTER
VACCINE ADMINISTRATION RECORD  PARENT / GUARDIAN APPROVAL  Date: 2020  Vaccine administered to: Marcus Wolf     : 1990    MRN: [id number]    A copy of the appropriate Centers for Disease Control and Prevention Vaccine Information st